# Patient Record
Sex: FEMALE | Race: WHITE | NOT HISPANIC OR LATINO | Employment: PART TIME | URBAN - METROPOLITAN AREA
[De-identification: names, ages, dates, MRNs, and addresses within clinical notes are randomized per-mention and may not be internally consistent; named-entity substitution may affect disease eponyms.]

---

## 2017-03-21 DIAGNOSIS — F33.42 MAJOR DEPRESSIVE DISORDER, RECURRENT EPISODE, IN FULL REMISSION (H): ICD-10-CM

## 2017-03-21 DIAGNOSIS — I10 BENIGN ESSENTIAL HYPERTENSION: ICD-10-CM

## 2017-03-21 RX ORDER — ESCITALOPRAM OXALATE 10 MG/1
TABLET ORAL
Qty: 90 TABLET | Refills: 2 | OUTPATIENT
Start: 2017-03-21

## 2017-03-21 NOTE — TELEPHONE ENCOUNTER
escitalopram 10mg      Last Written Prescription Date:  6/24/16  Last Fill Quantity: 90,   # refills: 3  Last Office Visit with [facility] primary care provider:  6/24/16  Future Office visit: none    Called pt's pharmacy ([facility]) who stated refill request sent in error. Rx denied.

## 2017-03-22 RX ORDER — LISINOPRIL/HYDROCHLOROTHIAZIDE 10-12.5 MG
TABLET ORAL
Qty: 90 TABLET | Refills: 2 | OUTPATIENT
Start: 2017-03-22

## 2017-03-22 NOTE — TELEPHONE ENCOUNTER
This is a duplicate Medication request of an already active prescription.  Notified pharmacy of date of original sent prescription  Kasey Morrow RN

## 2017-04-20 DIAGNOSIS — I10 BENIGN ESSENTIAL HYPERTENSION: ICD-10-CM

## 2017-04-20 RX ORDER — LISINOPRIL/HYDROCHLOROTHIAZIDE 10-12.5 MG
TABLET ORAL
Qty: 90 TABLET | Refills: 2 | OUTPATIENT
Start: 2017-04-20

## 2017-04-21 DIAGNOSIS — F33.42 MAJOR DEPRESSIVE DISORDER, RECURRENT EPISODE, IN FULL REMISSION (H): ICD-10-CM

## 2017-04-21 RX ORDER — ESCITALOPRAM OXALATE 10 MG/1
TABLET ORAL
Qty: 30 TABLET | Refills: 0 | Status: SHIPPED | OUTPATIENT
Start: 2017-04-21 | End: 2017-05-23

## 2017-04-21 NOTE — TELEPHONE ENCOUNTER
lexapro 10mg      Last Written Prescription Date:  6/24/16  Last Fill Quantity: 90,   # refills: 3  Last Office Visit with [facility] primary care provider:  6/24/16  Future Office visit: none    Pt due for annual, one month extension sent.

## 2017-05-18 DIAGNOSIS — I10 BENIGN ESSENTIAL HYPERTENSION: ICD-10-CM

## 2017-05-18 NOTE — TELEPHONE ENCOUNTER
lisinopril-hydrochlorothiazide (PRINZIDE,ZESTORETIC) 10-12.5 MG per tablet 90 tablet 3 9/20/2016          Last Written Prescription Date: 09/20/2016  Last Fill Quantity: 90, # refills: 3  Last Office Visit with [facility], [facility] or [facility] prescribing provider: 09/20/2016       Potassium   Date Value Ref Range Status   06/24/2016 4.6 3.4 - 5.3 mmol/L Final     Creatinine   Date Value Ref Range Status   06/24/2016 0.68 0.52 - 1.04 mg/dL Final     BP Readings from Last 3 Encounters:   09/20/16 148/80   08/26/16 (!) 152/92   07/25/16 159/84

## 2017-05-19 RX ORDER — LISINOPRIL/HYDROCHLOROTHIAZIDE 10-12.5 MG
TABLET ORAL
Qty: 90 TABLET | Refills: 1 | Status: SHIPPED | OUTPATIENT
Start: 2017-05-19 | End: 2017-07-06

## 2017-05-19 NOTE — TELEPHONE ENCOUNTER
Prescription approved per [facility] Refill Protocol.  Mckenzie De Luna RN

## 2017-05-21 DIAGNOSIS — F33.42 MAJOR DEPRESSIVE DISORDER, RECURRENT EPISODE, IN FULL REMISSION (H): ICD-10-CM

## 2017-05-22 RX ORDER — ESCITALOPRAM OXALATE 10 MG/1
TABLET ORAL
Qty: 30 TABLET | Refills: 0 | OUTPATIENT
Start: 2017-05-22

## 2017-05-22 NOTE — TELEPHONE ENCOUNTER
escitalopram 10mg      Last Written Prescription Date:  4/21/17  Last Fill Quantity: 30,   # refills: 0  Last Office Visit with [facility] primary care provider:  6/24/16  Future Office visit: none    Pt due for annual, no appt scheduled. Already received one month extension. Rx denied.

## 2017-05-23 RX ORDER — ESCITALOPRAM OXALATE 10 MG/1
10 TABLET ORAL DAILY
Qty: 30 TABLET | Refills: 0 | Status: SHIPPED | OUTPATIENT
Start: 2017-05-23 | End: 2017-06-20

## 2017-05-23 NOTE — TELEPHONE ENCOUNTER
Patient calling confused as to why she was told by pharmacy she is due for annual. Her last annual was 6/24/16 and will not have enough medication to get to appointment. She is not sure why her prescription did not bring her to her yearly exam date, but needs a refill. Rx/pharm pended. Please send if appropriate. Thanks.

## 2017-07-06 ENCOUNTER — RADIANT APPOINTMENT (OUTPATIENT)
Dept: MAMMOGRAPHY | Facility: CLINIC | Age: 57
End: 2017-07-06
Payer: COMMERCIAL

## 2017-07-06 ENCOUNTER — OFFICE VISIT (OUTPATIENT)
Dept: OBGYN | Facility: CLINIC | Age: 57
End: 2017-07-06
Payer: COMMERCIAL

## 2017-07-06 VITALS
DIASTOLIC BLOOD PRESSURE: 90 MMHG | BODY MASS INDEX: 30.53 KG/M2 | HEIGHT: 66 IN | SYSTOLIC BLOOD PRESSURE: 126 MMHG | WEIGHT: 190 LBS

## 2017-07-06 DIAGNOSIS — E78.49 OTHER HYPERLIPIDEMIA: ICD-10-CM

## 2017-07-06 DIAGNOSIS — I10 BENIGN ESSENTIAL HYPERTENSION: ICD-10-CM

## 2017-07-06 DIAGNOSIS — Z12.31 VISIT FOR SCREENING MAMMOGRAM: ICD-10-CM

## 2017-07-06 DIAGNOSIS — F33.42 MAJOR DEPRESSIVE DISORDER, RECURRENT EPISODE, IN FULL REMISSION (H): ICD-10-CM

## 2017-07-06 DIAGNOSIS — R73.01 ELEVATED FASTING GLUCOSE: ICD-10-CM

## 2017-07-06 DIAGNOSIS — I10 HYPERTENSION, ESSENTIAL: ICD-10-CM

## 2017-07-06 DIAGNOSIS — Z01.419 ENCOUNTER FOR GYNECOLOGICAL EXAMINATION WITHOUT ABNORMAL FINDING: Primary | ICD-10-CM

## 2017-07-06 LAB — HBA1C MFR BLD: 5.5 % (ref 4.3–6)

## 2017-07-06 PROCEDURE — 80061 LIPID PANEL: CPT | Performed by: OBSTETRICS & GYNECOLOGY

## 2017-07-06 PROCEDURE — 36415 COLL VENOUS BLD VENIPUNCTURE: CPT | Performed by: OBSTETRICS & GYNECOLOGY

## 2017-07-06 PROCEDURE — 77063 BREAST TOMOSYNTHESIS BI: CPT | Mod: TC

## 2017-07-06 PROCEDURE — 83036 HEMOGLOBIN GLYCOSYLATED A1C: CPT | Performed by: OBSTETRICS & GYNECOLOGY

## 2017-07-06 PROCEDURE — 80051 ELECTROLYTE PANEL: CPT | Performed by: OBSTETRICS & GYNECOLOGY

## 2017-07-06 PROCEDURE — G0202 SCR MAMMO BI INCL CAD: HCPCS | Mod: TC

## 2017-07-06 PROCEDURE — 82947 ASSAY GLUCOSE BLOOD QUANT: CPT | Performed by: OBSTETRICS & GYNECOLOGY

## 2017-07-06 PROCEDURE — 99396 PREV VISIT EST AGE 40-64: CPT | Performed by: OBSTETRICS & GYNECOLOGY

## 2017-07-06 RX ORDER — ESCITALOPRAM OXALATE 10 MG/1
10 TABLET ORAL DAILY
Qty: 90 TABLET | Refills: 0 | Status: SHIPPED | OUTPATIENT
Start: 2017-07-06 | End: 2017-10-01

## 2017-07-06 RX ORDER — LISINOPRIL/HYDROCHLOROTHIAZIDE 10-12.5 MG
1 TABLET ORAL DAILY
Qty: 30 TABLET | Refills: 0 | Status: SHIPPED | OUTPATIENT
Start: 2017-07-06 | End: 2017-08-04

## 2017-07-06 ASSESSMENT — ANXIETY QUESTIONNAIRES
7. FEELING AFRAID AS IF SOMETHING AWFUL MIGHT HAPPEN: NOT AT ALL
3. WORRYING TOO MUCH ABOUT DIFFERENT THINGS: NOT AT ALL
5. BEING SO RESTLESS THAT IT IS HARD TO SIT STILL: NOT AT ALL
6. BECOMING EASILY ANNOYED OR IRRITABLE: SEVERAL DAYS
2. NOT BEING ABLE TO STOP OR CONTROL WORRYING: NOT AT ALL
IF YOU CHECKED OFF ANY PROBLEMS ON THIS QUESTIONNAIRE, HOW DIFFICULT HAVE THESE PROBLEMS MADE IT FOR YOU TO DO YOUR WORK, TAKE CARE OF THINGS AT HOME, OR GET ALONG WITH OTHER PEOPLE: NOT DIFFICULT AT ALL
1. FEELING NERVOUS, ANXIOUS, OR ON EDGE: NOT AT ALL
GAD7 TOTAL SCORE: 1

## 2017-07-06 ASSESSMENT — PATIENT HEALTH QUESTIONNAIRE - PHQ9: 5. POOR APPETITE OR OVEREATING: NOT AT ALL

## 2017-07-06 NOTE — MR AVS SNAPSHOT
After Visit Summary   7/6/2017    Dixie Miller    MRN: [id number]           Patient Information     Date Of Birth          1960        Visit Information        Provider Department      7/6/2017 10:00 AM Davie Pace MD [facility]        Today's Diagnoses     Encounter for gynecological examination without abnormal finding    -  1    Major depressive disorder, recurrent episode, in full remission (H)        Benign essential hypertension        Other hyperlipidemia        Hypertension, essential        Elevated fasting glucose           Follow-ups after your visit        Who to contact     If you have questions or need follow up information about today's clinic visit or your schedule please contact [facility] [place] directly at [phone].  Normal or non-critical lab and imaging results will be communicated to you by MyChart, letter or phone within 4 business days after the clinic has received the results. If you do not hear from us within 7 days, please contact the clinic through Pockeehart or phone. If you have a critical or abnormal lab result, we will notify you by phone as soon as possible.  Submit refill requests through Sporthold or call your pharmacy and they will forward the refill request to us. Please allow 3 business days for your refill to be completed.          Additional Information About Your Visit        MyChart Information     Sporthold gives you secure access to your electronic health record. If you see a primary care provider, you can also send messages to your care team and make appointments. If you have questions, please call your primary care clinic.  If you do not have a primary care provider, please call [phone] and they will assist you.        Care EveryWhere ID     This is your Care EveryWhere ID. This could be used by other organizations to access your [place] medical records  [id number]        Your Vitals Were      "Height BMI (Body Mass Index)                5' 6\" (1.676 m) 30.67 kg/m2           Blood Pressure from Last 3 Encounters:   07/06/17 126/90   09/20/16 148/80   08/26/16 (!) 152/92    Weight from Last 3 Encounters:   07/06/17 190 lb (86.2 kg)   09/23/16 190 lb (86.2 kg)   09/20/16 190 lb (86.2 kg)              We Performed the Following     Electrolyte panel     Glucose     Hemoglobin A1c     Lipid Profile          Today's Medication Changes          These changes are accurate as of: 7/6/17 10:51 AM.  If you have any questions, ask your nurse or doctor.               These medicines have changed or have updated prescriptions.        Dose/Directions    escitalopram 10 MG tablet   Commonly known as:  LEXAPRO   This may have changed:  See the new instructions.   Used for:  Major depressive disorder, recurrent episode, in full remission (H)   Changed by:  Davie Pace MD        Dose:  10 mg   Take 1 tablet (10 mg) by mouth daily   Quantity:  90 tablet   Refills:  0       lisinopril-hydrochlorothiazide 10-12.5 MG per tablet   Commonly known as:  PRINZIDE/ZESTORETIC   This may have changed:  See the new instructions.   Used for:  Benign essential hypertension   Changed by:  Davie Pace MD        Dose:  1 tablet   Take 1 tablet by mouth daily   Quantity:  30 tablet   Refills:  0            Where to get your medicines      These medications were sent to [id number] - [place], [place] - [address]  [address] [place] [address]     Phone:  [phone]     escitalopram 10 MG tablet    lisinopril-hydrochlorothiazide 10-12.5 MG per tablet                Primary Care Provider Office Phone # Fax #    Merlin Emery Brown, [phone]       [facility] [address]  [place] [address]        Equal Access to Services     JEOVANY PINZON AH: Dorothea lopezo Sodelvis, waaxda luqadaha, qaybta kaalmada adeegyada, lynn jesus. So wac " [phone].    ATENCIÓN: Si gaby goncalves, tiene a freire disposición servicios gratuitos de asistencia lingüística. Yassine corado [phone].    We comply with applicable federal civil rights laws and [place] laws. We do not discriminate on the basis of race, color, national origin, age, disability sex, sexual orientation or gender identity.            Thank you!     Thank you for choosing [facility] [place]  for your care. Our goal is always to provide you with excellent care. Hearing back from our patients is one way we can continue to improve our services. Please take a few minutes to complete the written survey that you may receive in the mail after your visit with us. Thank you!             Your Updated Medication List - Protect others around you: Learn how to safely use, store and throw away your medicines at www.disposemymeds.org.          This list is accurate as of: 7/6/17 10:51 AM.  Always use your most recent med list.                   Brand Name Dispense Instructions for use Diagnosis    escitalopram 10 MG tablet    LEXAPRO    90 tablet    Take 1 tablet (10 mg) by mouth daily    Major depressive disorder, recurrent episode, in full remission (H)       lisinopril-hydrochlorothiazide 10-12.5 MG per tablet    PRINZIDE/ZESTORETIC    30 tablet    Take 1 tablet by mouth daily    Benign essential hypertension       MULTI ADULT GUMMIES PO

## 2017-07-06 NOTE — PROGRESS NOTES
Dixie is a 56 year old  female who presents for annual exam.     Besides routine health maintenance, she has no other health concerns today .    HPI:  The patient's PCP is Merlin Brown, MD  Has seen Tarah in past. May change to Brown.   On Lisinopril/HCTZ. BPs still high here and at [facility].   Planning to exercise with friend this month. Hasn't been regular. Will diet as well.   Wondering if needs Lexapro. Considering weaning.  with low libido. Pt with low libido.      GYNECOLOGIC HISTORY:    No LMP recorded. Patient is postmenopausal.  Her current contraception method is: menopause.  She  reports that she has never smoked. She has never used smokeless tobacco.    Patient is not sexually active.  STD testing offered?  Declined  Last PHQ-9 score on record =   PHQ-9 SCORE 2017   Total Score 1     Last GAD7 score on record =   ELISE-7 SCORE 2017   Total Score 1     Alcohol Score = 2    HEALTH MAINTENANCE:  Cholesterol:  Cholesterol   Date Value Ref Range Status   2016 206 (H) <200 mg/dL Final     Comment:     Desirable:       <200 mg/dl     Last Mammo: 16, Result: normal, Next Mammo: today   Pap: 16 WNL HPV Neg  Lab Results   Component Value Date    PAP NIL 2016      Colonoscopy:  10/28/16, Result: normal, Next Colonoscopy:   Dexa: Never    Health maintenance updated:  yes    HISTORY:  Obstetric History       T2      L2     SAB0   TAB0   Ectopic0   Multiple0   Live Births0       # Outcome Date GA Lbr Jerry/2nd Weight Sex Delivery Anes PTL Lv   2 Term            1 Term                   Patient Active Problem List   Diagnosis     Benign essential hypertension     Dysthymia     Past Surgical History:   Procedure Laterality Date      SECTION        Social History   Substance Use Topics     Smoking status: Never Smoker     Smokeless tobacco: Never Used     Alcohol use 0.0 oz/week     0 Standard drinks or equivalent per week      Comment: 3 wine a  "day      Problem (# of Occurrences) Relation (Name,Age of Onset)    DIABETES (1) Father            Current Outpatient Prescriptions   Medication Sig     escitalopram (LEXAPRO) 10 MG tablet Take 1 tablet (10 mg) by mouth daily     lisinopril-hydrochlorothiazide (PRINZIDE/ZESTORETIC) 10-12.5 MG per tablet Take 1 tablet by mouth daily     [DISCONTINUED] escitalopram (LEXAPRO) 10 MG tablet TAKE 1 TABLET BY MOUTH EVERY DAY     [DISCONTINUED] lisinopril-hydrochlorothiazide (PRINZIDE/ZESTORETIC) 10-12.5 MG per tablet TAKE 1 TABLET BY MOUTH DAILY     Multiple Vitamins-Minerals (MULTI ADULT GUMMIES PO)      No current facility-administered medications for this visit.      No Known Allergies    Past medical, surgical, social and family histories were reviewed and updated in EPIC.    ROS:   12 point review of systems negative other than symptoms noted below.  Gastrointestinal: Constipation and Diarrhea  Musculoskeletal: Joint Pain  Psychiatric: Depression    EXAM:  /90  Ht 5' 6\" (1.676 m)  Wt 190 lb (86.2 kg)  BMI 30.67 kg/m2   BMI: Body mass index is 30.67 kg/(m^2).    PHYSICAL EXAM:  Constitutional:  Appearance: Well nourished, well developed, alert, in no acute distress  Neck:  Lymph Nodes:  No lymphadenopathy present    Thyroid:  Gland size normal, nontender, no nodules or masses present  on palpation  Chest:  Respiratory Effort:  Breathing unlabored  Cardiovascular:    Heart: Auscultation:  Regular rate, normal rhythm, no murmurs present  Breasts: Inspection of Breasts:  No lymphadenopathy present    Palpation of Breasts and Axillae:  No masses present on palpation, no  breast tenderness    Axillary Lymph Nodes:  No lymphadenopathy present  Gastrointestinal:   Abdominal Examination:  Abdomen nontender to palpation, tone normal without rigidity or guarding, no masses present, umbilicus without lesions   Liver and Spleen:  No hepatomegaly present, liver nontender to palpation    Hernias:  No hernias " present  Lymphatic: Lymph Nodes:  No other lymphadenopathy present  Skin:  General Inspection:  No rashes present, no lesions present, no areas of  discoloration    Genitalia and Groin:  No rashes present, no lesions present, no areas of  discoloration, no masses present  Neurologic/Psychiatric:    Mental Status:  Oriented X3     Pelvic Exam:  External Genitalia:     Normal appearance for age, no discharge present, no tenderness present, no inflammatory lesions present, color normal  Vagina:     Normal vaginal vault without central or paravaginal defects, no discharge present, no inflammatory lesions present, no masses present  Bladder:     Nontender to palpation  Urethra:   Urethral Body:  Urethra palpation normal, urethra structural support normal   Urethral Meatus:  No erythema or lesions present  Cervix:     Appearance healthy, no lesions present, nontender to palpation, no bleeding present  Uterus:     Uterus: firm, normal sized and nontender, anteverted in position.   Adnexa:     No adnexal tenderness present, no adnexal masses present  Perineum:     Perineum within normal limits, no evidence of trauma, no rashes or skin lesions present  Anus:     Anus within normal limits, no hemorrhoids present  Inguinal Lymph Nodes:     No lymphadenopathy present  Pubic Hair:     Normal pubic hair distribution for age  Genitalia and Groin:     No rashes present, no lesions present, no areas of discoloration, no masses present    COUNSELING:   Reviewed preventive health counseling, as reflected in patient instructions       Regular exercise    BMI: Body mass index is 30.67 kg/(m^2).  Weight management plan: Diet and exercise    ASSESSMENT:  56 year old female with satisfactory annual exam.    ICD-10-CM    1. Encounter for gynecological examination without abnormal finding Z01.419    2. Major depressive disorder, recurrent episode, in full remission (H) F33.42 escitalopram (LEXAPRO) 10 MG tablet   3. Benign essential  hypertension I10 lisinopril-hydrochlorothiazide (PRINZIDE/ZESTORETIC) 10-12.5 MG per tablet   4. Other hyperlipidemia E78.4 Lipid Profile   5. Hypertension, essential I10 Electrolyte panel   6. Elevated fasting glucose R73.01 Glucose     Hemoglobin A1c       PLAN:  Will give 30 day Rx for Lisinopril and see if diet and exercise helps. Return to [facility] in 30 days. If BP high will have med change.  Discussed weaning Lexapro.    Davie Pace MD

## 2017-07-07 LAB
ANION GAP SERPL CALCULATED.3IONS-SCNC: 6 MMOL/L (ref 3–14)
CHLORIDE SERPL-SCNC: 103 MMOL/L (ref 94–109)
CHOLEST SERPL-MCNC: 223 MG/DL
CO2 SERPL-SCNC: 32 MMOL/L (ref 20–32)
GLUCOSE SERPL-MCNC: 103 MG/DL (ref 70–99)
HDLC SERPL-MCNC: 69 MG/DL
LDLC SERPL CALC-MCNC: 123 MG/DL
NONHDLC SERPL-MCNC: 154 MG/DL
POTASSIUM SERPL-SCNC: 3.9 MMOL/L (ref 3.4–5.3)
SODIUM SERPL-SCNC: 141 MMOL/L (ref 133–144)
TRIGL SERPL-MCNC: 154 MG/DL

## 2017-07-07 ASSESSMENT — ANXIETY QUESTIONNAIRES: GAD7 TOTAL SCORE: 1

## 2017-07-07 ASSESSMENT — PATIENT HEALTH QUESTIONNAIRE - PHQ9: SUM OF ALL RESPONSES TO PHQ QUESTIONS 1-9: 1

## 2017-07-07 NOTE — PROGRESS NOTES
Dear LINDA,   Lipids overall not bad. Glucose a little high but HbA1c normal so nothing to do except the dieting and exercise.   Plan to repeat fasting labs next year.       Davie Pace MD

## 2017-08-04 ENCOUNTER — OFFICE VISIT (OUTPATIENT)
Dept: FAMILY MEDICINE | Facility: CLINIC | Age: 57
End: 2017-08-04
Payer: COMMERCIAL

## 2017-08-04 VITALS
HEART RATE: 99 BPM | OXYGEN SATURATION: 96 % | WEIGHT: 191 LBS | TEMPERATURE: 97.6 F | DIASTOLIC BLOOD PRESSURE: 78 MMHG | HEIGHT: 66 IN | SYSTOLIC BLOOD PRESSURE: 127 MMHG | BODY MASS INDEX: 30.7 KG/M2

## 2017-08-04 DIAGNOSIS — F34.1 DYSTHYMIA: ICD-10-CM

## 2017-08-04 DIAGNOSIS — I10 BENIGN ESSENTIAL HYPERTENSION: Primary | ICD-10-CM

## 2017-08-04 DIAGNOSIS — Z11.59 NEED FOR HEPATITIS C SCREENING TEST: ICD-10-CM

## 2017-08-04 PROCEDURE — 86803 HEPATITIS C AB TEST: CPT | Performed by: INTERNAL MEDICINE

## 2017-08-04 PROCEDURE — 99213 OFFICE O/P EST LOW 20 MIN: CPT | Performed by: INTERNAL MEDICINE

## 2017-08-04 PROCEDURE — 80048 BASIC METABOLIC PNL TOTAL CA: CPT | Performed by: INTERNAL MEDICINE

## 2017-08-04 PROCEDURE — 36415 COLL VENOUS BLD VENIPUNCTURE: CPT | Performed by: INTERNAL MEDICINE

## 2017-08-04 RX ORDER — LISINOPRIL/HYDROCHLOROTHIAZIDE 10-12.5 MG
1 TABLET ORAL DAILY
Qty: 90 TABLET | Refills: 3 | Status: SHIPPED | OUTPATIENT
Start: 2017-08-04 | End: 2018-05-02

## 2017-08-04 RX ORDER — BUPROPION HYDROCHLORIDE 150 MG/1
150 TABLET ORAL EVERY MORNING
Qty: 90 TABLET | Refills: 3 | Status: SHIPPED | OUTPATIENT
Start: 2017-08-04 | End: 2017-11-22 | Stop reason: ALTCHOICE

## 2017-08-04 NOTE — PATIENT INSTRUCTIONS
Be sure to exercise more, try to get your weight down 10 pounds, limit your alcohol to 7 to 10 a week.  Monitor your blood pressure and if it is not staying under 140/90 let me know.    Change the lexapro to 5mg and then after 2 weeks you can switch to the wellbutrin, let me know if this does not work for you.    Nael Tapia M.D.

## 2017-08-04 NOTE — PROGRESS NOTES
Dixie Miller is a 56 year old female who presents for follow up hypertension, dysthymia.  For the latter doing fine but decreased sexual interest and wonders about going off it.  No depression.      For the blood pressure borderline, but not working out reg, weight and issue and alcohol a bit much.  She feels fine, no chest pain or shortness of breath, no ha.    She is up to date mammogram, colon and pap.    Past Medical History:   Diagnosis Date     Benign essential hypertension     added 2nd med 9/15     Dysthymia      H/O colonoscopy 10/2016    at [facility], 2mm hyperplastic polyp, fu 10 years     High cholesterol      IBS (irritable bowel syndrome)      Past Surgical History:   Procedure Laterality Date      SECTION       Social History     Social History     Marital status:      Spouse name: N/A     Number of children: 2     Years of education: N/A     Occupational History     retail at [employer]      Social History Main Topics     Smoking status: Never Smoker     Smokeless tobacco: Never Used     Alcohol use 0.0 oz/week     0 Standard drinks or equivalent per week      Comment: 3 wine a day     Drug use: No     Sexual activity: Yes     Birth control/ protection: Post-menopausal     Other Topics Concern     Not on file     Social History Narrative     Current Outpatient Prescriptions   Medication Sig Dispense Refill     lisinopril-hydrochlorothiazide (PRINZIDE/ZESTORETIC) 10-12.5 MG per tablet Take 1 tablet by mouth daily 90 tablet 3     buPROPion (WELLBUTRIN XL) 150 MG 24 hr tablet Take 1 tablet (150 mg) by mouth every morning 90 tablet 3     escitalopram (LEXAPRO) 10 MG tablet Take 1 tablet (10 mg) by mouth daily 90 tablet 0     Multiple Vitamins-Minerals (MULTI ADULT GUMMIES PO)        [DISCONTINUED] lisinopril-hydrochlorothiazide (PRINZIDE/ZESTORETIC) 10-12.5 MG per tablet Take 1 tablet by mouth daily 30 tablet 0     No Known Allergies  FAMILY HISTORY NOTED AND  "REVIEWED    REVIEW OF SYSTEMS: above    PHYSICAL EXAM    /78 (BP Location: Left arm, Patient Position: Chair, Cuff Size: Adult Regular)  Pulse 99  Temp 97.6  F (36.4  C) (Oral)  Ht 5' 6\" (1.676 m)  Wt 191 lb (86.6 kg)  SpO2 96%  Breastfeeding? No  BMI 30.83 kg/m2    Patient appears non toxic  Lungs clear  cv reglar rate and rhythm, nmgr, no jvp or edema    Labs sent    ASSESSMENT:  1. Hypertension, control fair, she will try exercise, diet and if not good call  2. Dysthymia, dec sex drive, ok to wean to 5mg lexapro then start wellbutrin and dc it, call if not effective  3. Health care maintenance    PLAN:  Above    Nael Tapia M.D.        "

## 2017-08-04 NOTE — NURSING NOTE
"Chief Complaint   Patient presents with     Hypertension       Initial /78 (BP Location: Left arm, Patient Position: Chair, Cuff Size: Adult Regular)  Pulse 99  Temp 97.6  F (36.4  C) (Oral)  Ht 5' 6\" (1.676 m)  Wt 191 lb (86.6 kg)  SpO2 96%  Breastfeeding? No  BMI 30.83 kg/m2 Estimated body mass index is 30.83 kg/(m^2) as calculated from the following:    Height as of this encounter: 5' 6\" (1.676 m).    Weight as of this encounter: 191 lb (86.6 kg).  Medication Reconciliation: complete.  Gabbi Luis CMA    "

## 2017-08-04 NOTE — MR AVS SNAPSHOT
After Visit Summary   8/4/2017    Dixie Miller    MRN: [id number]           Patient Information     Date Of Birth          1960        Visit Information        Provider Department      8/4/2017 11:30 AM Nael Tapia MD [facility]        Today's Diagnoses     Benign essential hypertension    -  1    Dysthymia        Need for hepatitis C screening test          Care Instructions    Be sure to exercise more, try to get your weight down 10 pounds, limit your alcohol to 7 to 10 a week.  Monitor your blood pressure and if it is not staying under 140/90 let me know.    Change the lexapro to 5mg and then after 2 weeks you can switch to the wellbutrin, let me know if this does not work for you.    Nael Tapia M.D.            Follow-ups after your visit        Who to contact     If you have questions or need follow up information about today's clinic visit or your schedule please contact [facility] directly at [phone].  Normal or non-critical lab and imaging results will be communicated to you by Interactive Networkshart, letter or phone within 4 business days after the clinic has received the results. If you do not hear from us within 7 days, please contact the clinic through E[employer] Studiot or phone. If you have a critical or abnormal lab result, we will notify you by phone as soon as possible.  Submit refill requests through eVropa or call your pharmacy and they will forward the refill request to us. Please allow 3 business days for your refill to be completed.          Additional Information About Your Visit        Interactive Networkshart Information     eVropa gives you secure access to your electronic health record. If you see a primary care provider, you can also send messages to your care team and make appointments. If you have questions, please call your primary care clinic.  If you do not have a primary care provider, please call [phone] and they will assist you.        Care  "EveryWhere ID     This is your Care EveryWhere ID. This could be used by other organizations to access your [place] medical records  [id number]        Your Vitals Were     Pulse Temperature Height Pulse Oximetry Breastfeeding? BMI (Body Mass Index)    99 97.6  F (36.4  C) (Oral) 5' 6\" (1.676 m) 96% No 30.83 kg/m2       Blood Pressure from Last 3 Encounters:   08/04/17 127/78   07/06/17 126/90   09/20/16 148/80    Weight from Last 3 Encounters:   08/04/17 191 lb (86.6 kg)   07/06/17 190 lb (86.2 kg)   09/23/16 190 lb (86.2 kg)              We Performed the Following     Basic metabolic panel     Hepatitis C Screen Reflex to HCV RNA Quant and Genotype          Today's Medication Changes          These changes are accurate as of: 8/4/17 11:57 AM.  If you have any questions, ask your nurse or doctor.               Start taking these medicines.        Dose/Directions    buPROPion 150 MG 24 hr tablet   Commonly known as:  WELLBUTRIN XL   Used for:  Dysthymia   Started by:  Nael Tapia MD        Dose:  150 mg   Take 1 tablet (150 mg) by mouth every morning   Quantity:  90 tablet   Refills:  3            Where to get your medicines      These medications were sent to [id number] [place] - [address]  [address] [place] [address]     Phone:  [phone]     lisinopril-hydrochlorothiazide 10-12.5 MG per tablet         Some of these will need a paper prescription and others can be bought over the counter.  Ask your nurse if you have questions.     Bring a paper prescription for each of these medications     buPROPion 150 MG 24 hr tablet                Primary Care Provider Office Phone # Fax #    Nael Tapia [phone]       [facility] [address]  [place] [address]        Equal Access to Services     JEOVANY PINZON AH: Hadii aad ku hadasho Soomaali, waaxda luqadaha, qaybta kaalmada adeegyada, lynn rivas ah. So wa " [phone].    ATENCIÓN: Si gaby goncalves, tiene a freire disposición servicios gratuitos de asistencia lingüística. Yassine corado [phone].    We comply with applicable federal civil rights laws and [place] laws. We do not discriminate on the basis of race, color, national origin, age, disability sex, sexual orientation or gender identity.            Thank you!     Thank you for choosing [facility]  for your care. Our goal is always to provide you with excellent care. Hearing back from our patients is one way we can continue to improve our services. Please take a few minutes to complete the written survey that you may receive in the mail after your visit with us. Thank you!             Your Updated Medication List - Protect others around you: Learn how to safely use, store and throw away your medicines at www.disposemymeds.org.          This list is accurate as of: 8/4/17 11:57 AM.  Always use your most recent med list.                   Brand Name Dispense Instructions for use Diagnosis    buPROPion 150 MG 24 hr tablet    WELLBUTRIN XL    90 tablet    Take 1 tablet (150 mg) by mouth every morning    Dysthymia       escitalopram 10 MG tablet    LEXAPRO    90 tablet    Take 1 tablet (10 mg) by mouth daily    Major depressive disorder, recurrent episode, in full remission (H)       lisinopril-hydrochlorothiazide 10-12.5 MG per tablet    PRINZIDE/ZESTORETIC    90 tablet    Take 1 tablet by mouth daily    Benign essential hypertension       MULTI ADULT GUMMIES PO

## 2017-08-05 LAB
ANION GAP SERPL CALCULATED.3IONS-SCNC: 7 MMOL/L (ref 3–14)
BUN SERPL-MCNC: 17 MG/DL (ref 7–30)
CALCIUM SERPL-MCNC: 9.5 MG/DL (ref 8.5–10.1)
CHLORIDE SERPL-SCNC: 103 MMOL/L (ref 94–109)
CO2 SERPL-SCNC: 31 MMOL/L (ref 20–32)
CREAT SERPL-MCNC: 0.7 MG/DL (ref 0.52–1.04)
GFR SERPL CREATININE-BSD FRML MDRD: 85 ML/MIN/1.7M2
GLUCOSE SERPL-MCNC: 106 MG/DL (ref 70–99)
POTASSIUM SERPL-SCNC: 4.7 MMOL/L (ref 3.4–5.3)
SODIUM SERPL-SCNC: 141 MMOL/L (ref 133–144)

## 2017-08-05 ASSESSMENT — PATIENT HEALTH QUESTIONNAIRE - PHQ9: SUM OF ALL RESPONSES TO PHQ QUESTIONS 1-9: 5

## 2017-08-07 LAB — HCV AB SERPL QL IA: NORMAL

## 2017-08-07 NOTE — PROGRESS NOTES
It was a pleasure to see you.  I wanted to let you know your test results.  Please let me know if you are not able to view them.    Your labs are fine.  No signs of hepatitis C and your blood salts and kidneys look fine.  Your sugar is just a bit high so please be sure to exercise and keep the weight down for this.    If you have any questions please call me.    Nael Tapai M.D.

## 2017-11-22 ENCOUNTER — HOSPITAL ENCOUNTER (EMERGENCY)
Facility: CLINIC | Age: 57
Discharge: HOME | End: 2017-11-22
Attending: EMERGENCY MEDICINE | Admitting: EMERGENCY MEDICINE
Payer: COMMERCIAL

## 2017-11-22 ENCOUNTER — APPOINTMENT (OUTPATIENT)
Dept: GENERAL RADIOLOGY | Facility: CLINIC | Age: 57
End: 2017-11-22
Attending: EMERGENCY MEDICINE
Payer: COMMERCIAL

## 2017-11-22 VITALS
WEIGHT: 185 LBS | DIASTOLIC BLOOD PRESSURE: 66 MMHG | RESPIRATION RATE: 18 BRPM | OXYGEN SATURATION: 100 % | HEART RATE: 84 BPM | HEIGHT: 66 IN | SYSTOLIC BLOOD PRESSURE: 140 MMHG | BODY MASS INDEX: 29.73 KG/M2 | TEMPERATURE: 98.2 F

## 2017-11-22 DIAGNOSIS — M25.569 KNEE PAIN: ICD-10-CM

## 2017-11-22 DIAGNOSIS — M23.91 INTERNAL DERANGEMENT OF KNEE, RIGHT: ICD-10-CM

## 2017-11-22 PROCEDURE — 25000132 ZZH RX MED GY IP 250 OP 250 PS 637: Performed by: EMERGENCY MEDICINE

## 2017-11-22 PROCEDURE — 73560 X-RAY EXAM OF KNEE 1 OR 2: CPT | Mod: RT

## 2017-11-22 PROCEDURE — 99284 EMERGENCY DEPT VISIT MOD MDM: CPT

## 2017-11-22 RX ORDER — HYDROCODONE BITARTRATE AND ACETAMINOPHEN 5; 325 MG/1; MG/1
1 TABLET ORAL ONCE
Status: COMPLETED | OUTPATIENT
Start: 2017-11-22 | End: 2017-11-22

## 2017-11-22 RX ADMIN — HYDROCODONE BITARTRATE AND ACETAMINOPHEN 1 TABLET: 5; 325 TABLET ORAL at 14:41

## 2017-11-22 ASSESSMENT — ENCOUNTER SYMPTOMS: ARTHRALGIAS: 1

## 2017-11-22 NOTE — ED AVS SNAPSHOT
Emergency Department    [address] [place] [address]    Phone:  [phone]    Fax:  [phone]                                       Dixie Miller   MRN: [id number]    Department:   Emergency Department   Date of Visit:  11/22/2017           After Visit Summary Signature Page     I have received my discharge instructions, and my questions have been answered. I have discussed any challenges I see with this plan with the nurse or doctor.    ..........................................................................................................................................  Patient/Patient Representative Signature      ..........................................................................................................................................  Patient Representative Print Name and Relationship to Patient    ..................................................               ................................................  Date                                            Time    ..........................................................................................................................................  Reviewed by Signature/Title    ...................................................              ..............................................  Date                                                            Time

## 2017-11-22 NOTE — ED PROVIDER NOTES
"  History     Chief Complaint:  Knee pain    The history is provided by the patient.      Dixie Miller is a 56 year old female who presents with knee pain. The patient reports that she was at work talking on the phone when she crossed her legs, when she uncrossed them she felt sudden pain. She did not hear any popping sound. She was helped by coworkers to walk but otherwise is not able to bear weight secondary to pain, or bend the knee to 90 degrees (due to pain) prompting her to the emergency department. She says the pain is mostly in the lateral aspect of her knee and does not radiate to her hip. She rates her pain a 2/10 in severity. She has no other complaints.     Allergies:  No known drug allergies     Medications:    Lexapro   Prinzides/zestoretic     Past Medical History:    Anxiety   Benign essential hypertension   Depressive disorder   Dysthymia   H/O colonoscopy   High cholesterol   IBS (irritable bowel syndrome)    Past Surgical History:    C section    Family History:    DM    Social History:  Presents with no one    Tobacco use: Never smoker   Alcohol use: 3 glasses of wine a day   PCP: Nael Tapia    Marital Status:       Review of Systems   Musculoskeletal: Positive for arthralgias and gait problem.   All other systems reviewed and are negative.    Physical Exam     Patient Vitals for the past 24 hrs:   BP Temp Temp src Pulse Resp SpO2 Height Weight   11/22/17 1400 140/66 98.2  F (36.8  C) Oral 84 18 100 % 1.676 m (5' 6\") 83.9 kg (185 lb)        Physical Exam  General: Seen lying in bed, well appearing, alert and pleasant  Eyes: Tracking well, clear conj BL  CV: No JVD, no pallor  Resp: no tachypnea, speaking in full sentences   Skin: no rashes seen, no e/o trauma  MSK- right lower extremity with full range of motion passively to the knee.  Does have mild pain to lateral margin of the knee.  No swelling noted, no skin changes.  Extensor mechanism intact as is flexor mechanism.  No " clonus, no pain out of proportion does have mild pain on ambulation, but is able to bear weight.  Extremities warm and well perfused, normal compartments sensation and motor are intact distally.  No excess laxity of joint  Neuro: MONREAL spontaneously        Emergency Department Course     \Imaging:  Radiographic findings were communicated with the patient who voiced understanding of the findings.     Knee XR, 3 views, Right:  IMPRESSION: Tricompartmental right knee degenerative changes with at least moderate joint space loss and patellofemoral compartment. No fractures are seen. Small amount of fluid in the joint.    WILY DRISCOLL MD    Imaging independently reviewed and agree with radiologist interpretation.      Interventions:  1441: Norco 5/325 1 tablet PO      Emergency Department Course:  Past medical records, nursing notes, and vitals reviewed.  1428: I performed an exam of the patient and obtained history, as documented above.   Above interventions provided.    The patient was sent for a XR while in the emergency department, findings above.   Patinet given knee immobilizer prior to discharge.   1527: I rechecked the patient. Findings and plan explained to the Patient. Patient discharged home with instructions regarding supportive care, medications, and reasons to return. The importance of close follow-up was reviewed.      Impression & Plan      Medical Decision Making:  Dixie Miller is a 56 year old female who presents with a non contact knee pain to her right knee. Patient was ambulatory with a slight limp, has full range of motion at the knee, no pain with the patellar tendon and does have extensor and flexor mechanisms intact. XR done and shows no evidence of evulsion fracture. With this in mind, patient may need, given her pain and MRI as ligamentous injury cannot be ruled out. I will discharge the patient in a knee brace with weight bearing as tolerated. She states she is okay with this plan and can  take over the counter medication for her pain. Discussed very clear return ED precautions and outpatient follow up.     Diagnosis:    ICD-10-CM    1. Internal derangement of knee, right M23.91        Disposition:  Discharged to home with plan as outlined.     Scribe Disclosure:   I, Cory Finnegan, am serving as a scribe at 2:28 PM on 11/22/2017 to document services personally performed by Luke Gong MD based on my observations and the provider's statements to me.       Cory Finnegan  11/22/2017    EMERGENCY DEPARTMENT       Demetri Gong MD  11/22/17 3539

## 2017-11-22 NOTE — DISCHARGE INSTRUCTIONS
How Your Knee Works  A healthy knee bends easily and rotates slightly. The joint absorbs stress and moves smoothly. This allows you to walk, squat, and turn without pain.    A healthy knee  The knee is a hinge joint, formed where the thighbone (femur) and the shinbone (tibia) meet. It is the largest joint in the body. The joint is covered with smooth tissue and powered by large muscles. When all the parts listed below are healthy, a knee should move easily:    Cartilage is a layer of smooth tissue. It covers the ends of the thighbone and shinbone. It also lines the back side of the kneecap. Healthy cartilage absorbs stress and allows the knee to bend easily.    Muscles power the knee and leg for movement.    Tendons attach the muscles to the bones.    Ligaments are bands of tissue that connect bones and brace the joint.    Bones that make up your knee joint include your thighbone (femur), shinbone (tibia), and kneecap (patella).    Menisci are 2 wedge shaped pieces of cartilage that absorb shock between the thighbone and shinbone.  Date Last Reviewed: 9/20/2015 2000-2017 [employer]. [address] [place] [address], [place] [address]. All rights reserved. This information is not intended as a substitute for professional medical care. Always follow your healthcare professional's instructions.

## 2017-11-22 NOTE — ED AVS SNAPSHOT
Emergency Department    [address] [facility] [address]    Phone:  [phone]    Fax:  [phone]                                       Dixie Miller   MRN: [id number]    Department:   Emergency Department   Date of Visit:  11/22/2017           Patient Information     Date Of Birth          1960        Your diagnoses for this visit were:     Internal derangement of knee, right        You were seen by Demetri Gong MD.      Follow-up Information     Follow up with Nael Tapia MD. Call in 1 week.    Specialty:  Internal Medicine    Why:  For repeat evaluation and symptom check and MRI if indicated. You may bear weight as tolerated, as discussed.     Contact information:    [address] [id number] [phone]          Follow up with  Emergency Department.    Specialty:  EMERGENCY MEDICINE    Why:  If symptoms worsen    Contact information:    [address] [facility] [address] [phone]        Discharge Instructions         How Your Knee Works  A healthy knee bends easily and rotates slightly. The joint absorbs stress and moves smoothly. This allows you to walk, squat, and turn without pain.    A healthy knee  The knee is a hinge joint, formed where the thighbone (femur) and the shinbone (tibia) meet. It is the largest joint in the body. The joint is covered with smooth tissue and powered by large muscles. When all the parts listed below are healthy, a knee should move easily:    Cartilage is a layer of smooth tissue. It covers the ends of the thighbone and shinbone. It also lines the back side of the kneecap. Healthy cartilage absorbs stress and allows the knee to bend easily.    Muscles power the knee and leg for movement.    Tendons attach the muscles to the bones.    Ligaments are bands of tissue that connect bones and brace the joint.    Bones that make up your knee joint include your thighbone  (femur), shinbone (tibia), and kneecap (patella).    Menisci are 2 wedge shaped pieces of cartilage that absorb shock between the thighbone and shinbone.  Date Last Reviewed: 9/20/2015 2000-2017 [employer]. [address] [place] [address]. All rights reserved. This information is not intended as a substitute for professional medical care. Always follow your healthcare professional's instructions.          Future Appointments        Provider Department Dept Phone Center    12/4/2017 2:30 PM Nael Tapia MD [facility] [phone]       24 Hour Appointment Hotline       To make an appointment at any [facility], call [phone] ([phone]). If you don't have a family doctor or clinic, we will help you find one. [facility] are conveniently located to serve the needs of you and your family.             Review of your medicines      Our records show that you are taking the medicines listed below. If these are incorrect, please call your family doctor or clinic.        Dose / Directions Last dose taken    escitalopram 10 MG tablet   Commonly known as:  LEXAPRO   Quantity:  90 tablet        TAKE 1 TABLET (10 MG) BY MOUTH DAILY   Refills:  0        lisinopril-hydrochlorothiazide 10-12.5 MG per tablet   Commonly known as:  PRINZIDE/ZESTORETIC   Dose:  1 tablet   Quantity:  90 tablet        Take 1 tablet by mouth daily   Refills:  3        MULTI ADULT GUMMIES PO        Refills:  0                Procedures and tests performed during your visit     Knee XR, 1-2 views, right      Orders Needing Specimen Collection     None      Pending Results     No orders found from 11/20/2017 to 11/23/2017.            Pending Culture Results     No orders found from 11/20/2017 to 11/23/2017.            Pending Results Instructions     If you had any lab results that were not finalized at the time of your Discharge, you can call the ED Lab Result RN at [phone]. You will be  contacted by this team for any positive Lab results or changes in treatment. The nurses are available 7 days a week from 10A to 6:30P.  You can leave a message 24 hours per day and they will return your call.        Test Results From Your Hospital Stay        11/22/2017  2:59 PM      Narrative     XR KNEE RT 1 /2 VW 11/22/2017 2:55 PM    COMPARISON: None.    HISTORY: Knee pain.        Impression     IMPRESSION: Tricompartmental right knee degenerative changes with at  least moderate joint space loss and patellofemoral compartment. No  fractures are seen. Small amount of fluid in the joint.    WILY DRISCOLL MD                Clinical Quality Measure: Blood Pressure Screening     Your blood pressure was checked while you were in the emergency department today. The last reading we obtained was  BP: 140/66 . Please read the guidelines below about what these numbers mean and what you should do about them.  If your systolic blood pressure (the top number) is less than 120 and your diastolic blood pressure (the bottom number) is less than 80, then your blood pressure is normal. There is nothing more that you need to do about it.  If your systolic blood pressure (the top number) is 120-139 or your diastolic blood pressure (the bottom number) is 80-89, your blood pressure may be higher than it should be. You should have your blood pressure rechecked within a year by a primary care provider.  If your systolic blood pressure (the top number) is 140 or greater or your diastolic blood pressure (the bottom number) is 90 or greater, you may have high blood pressure. High blood pressure is treatable, but if left untreated over time it can put you at risk for heart attack, stroke, or kidney failure. You should have your blood pressure rechecked by a primary care provider within the next 4 weeks.  If your provider in the emergency department today gave you specific instructions to follow-up with your doctor or provider even sooner  than that, you should follow that instruction and not wait for up to 4 weeks for your follow-up visit.        Thank you for choosing [facility]       Thank you for choosing [facility] for your care. Our goal is always to provide you with excellent care. Hearing back from our patients is one way we can continue to improve our services. Please take a few minutes to complete the written survey that you may receive in the mail after you visit with us. Thank you!        [employer] Information     [employer] gives you secure access to your electronic health record. If you see a primary care provider, you can also send messages to your care team and make appointments. If you have questions, please call your primary care clinic.  If you do not have a primary care provider, please call [phone] and they will assist you.        Care EveryWhere ID     This is your Care EveryWhere ID. This could be used by other organizations to access your [facility] medical records  [id number]        Equal Access to Services     JEOVANY PINZON : Dorothea Matthew, evin manriquez, aubrie nuno, lynn rivas . [facility] [phone].    ATENCIÓN: Si habla español, tiene a freire disposición servicios gratuitos de asistencia lingüística. Llame al [phone].    We comply with applicable federal civil rights laws and [place] laws. We do not discriminate on the basis of race, color, national origin, age, disability, sex, sexual orientation, or gender identity.            After Visit Summary       This is your record. Keep this with you and show to your community pharmacist(s) and doctor(s) at your next visit.

## 2017-11-24 ENCOUNTER — HOSPITAL ENCOUNTER (OUTPATIENT)
Dept: MRI IMAGING | Facility: CLINIC | Age: 57
Discharge: HOME | End: 2017-11-24
Attending: INTERNAL MEDICINE | Admitting: INTERNAL MEDICINE
Payer: COMMERCIAL

## 2017-11-24 PROCEDURE — 73721 MRI JNT OF LWR EXTRE W/O DYE: CPT | Mod: RT

## 2018-05-02 DIAGNOSIS — F33.42 MAJOR DEPRESSIVE DISORDER, RECURRENT EPISODE, IN FULL REMISSION (H): ICD-10-CM

## 2018-05-02 RX ORDER — ESCITALOPRAM OXALATE 10 MG/1
TABLET ORAL
Qty: 90 TABLET | Refills: 1 | Status: SHIPPED | OUTPATIENT
Start: 2018-05-02 | End: 2018-12-29

## 2018-10-26 DIAGNOSIS — I10 BENIGN ESSENTIAL HYPERTENSION: ICD-10-CM

## 2018-10-26 RX ORDER — LISINOPRIL/HYDROCHLOROTHIAZIDE 10-12.5 MG
TABLET ORAL
Qty: 30 TABLET | Refills: 0 | Status: SHIPPED | OUTPATIENT
Start: 2018-10-26 | End: 2022-05-19

## 2018-10-26 NOTE — TELEPHONE ENCOUNTER
Medication is being filled for 1 time refill only due to:  Patient needs to be seen because it has been more than one year since last visit.   Last seen 8/14/17  Cristiane Cardenas RN

## 2018-10-26 NOTE — TELEPHONE ENCOUNTER
"Lisinopril-hydrochlorothiazide 10-12.5 mg    Last Written Prescription Date:  05/03/18  Last Fill Quantity: 90 tablets,  # refills: 0   Last office visit: 8/4/2017 with prescribing provider:  Willie   Future Office Visit:      Requested Prescriptions   Pending Prescriptions Disp Refills     lisinopril-hydrochlorothiazide (PRINZIDE/ZESTORETIC) 10-12.5 MG per tablet [Pharmacy Med Name: LISINOPRIL-HCTZ 10-12.5 MG TAB] 90 tablet 0     Sig: TAKE 1 TABLET BY MOUTH EVERY DAY. APPOINTMENT NEEDED FOR FURTHER REFILLS.    Diuretics (Including Combos) Protocol Failed    10/26/2018  1:30 AM       Failed - Blood pressure under 140/90 in past 12 months    BP Readings from Last 3 Encounters:   11/22/17 140/66   08/04/17 127/78   07/06/17 126/90                Failed - Recent (12 mo) or future (30 days) visit within the authorizing provider's specialty    Patient had office visit in the last 12 months or has a visit in the next 30 days with authorizing provider or within the authorizing provider's specialty.  See \"Patient Info\" tab in inbasket, or \"Choose Columns\" in Meds & Orders section of the refill encounter.             Failed - Normal serum creatinine on file in past 12 months    Recent Labs   Lab Test  08/04/17   1212   CR  0.70             Failed - Normal serum potassium on file in past 12 months    Recent Labs   Lab Test  08/04/17   1212   POTASSIUM  4.7                   Failed - Normal serum sodium on file in past 12 months    Recent Labs   Lab Test  08/04/17   1212   NA  141             Passed - Patient is age 18 or older       Passed - No active pregancy on record       Passed - No positive pregnancy test in past 12 months          "

## 2018-11-27 ENCOUNTER — HOSPITAL ENCOUNTER (OUTPATIENT)
Dept: BONE DENSITY | Facility: CLINIC | Age: 58
Discharge: HOME | End: 2018-11-27
Attending: INTERNAL MEDICINE | Admitting: INTERNAL MEDICINE
Payer: COMMERCIAL

## 2018-11-27 DIAGNOSIS — Z78.0 POST-MENOPAUSAL: ICD-10-CM

## 2018-11-27 DIAGNOSIS — I10 BENIGN ESSENTIAL HYPERTENSION: ICD-10-CM

## 2018-11-27 PROCEDURE — 77080 DXA BONE DENSITY AXIAL: CPT

## 2018-11-27 NOTE — TELEPHONE ENCOUNTER
"lisinopril-hydrochlorothiazide (PRINZIDE/ZESTORETIC) 10-12.5 MG per tablet 30 tablet 0 10/26/2018  No   Sig: TAKE 1 TABLET BY MOUTH EVERY DAY. APPOINTMENT NEEDED FOR FURTHER REFILLS.   Class: E-Prescribe   Notes to Pharmacy: Refill one month. Please inform patient appointment needed for further refills. Thank you       Last Written Prescription Date:  10/26/2018  Last Fill Quantity: 30,  # refills: 0   Last office visit: 8/4/2017 with prescribing provider:     Future Office Visit:      Requested Prescriptions   Pending Prescriptions Disp Refills     lisinopril-hydrochlorothiazide (PRINZIDE/ZESTORETIC) 10-12.5 MG per tablet [Pharmacy Med Name: LISINOPRIL-HCTZ 10-12.5 MG TAB] 30 tablet 0     Sig: TAKE 1 TABLET BY MOUTH EVERY DAY. APPOINTMENT NEEDED FOR FURTHER REFILLS.    Diuretics (Including Combos) Protocol Failed    11/27/2018 11:49 AM       Failed - Blood pressure under 140/90 in past 12 months    BP Readings from Last 3 Encounters:   11/22/17 140/66   08/04/17 127/78   07/06/17 126/90                Failed - Recent (12 mo) or future (30 days) visit within the authorizing provider's specialty    Patient had office visit in the last 12 months or has a visit in the next 30 days with authorizing provider or within the authorizing provider's specialty.  See \"Patient Info\" tab in inbasket, or \"Choose Columns\" in Meds & Orders section of the refill encounter.             Failed - Normal serum creatinine on file in past 12 months    Recent Labs   Lab Test  08/04/17   1212   CR  0.70             Failed - Normal serum potassium on file in past 12 months    Recent Labs   Lab Test  08/04/17   1212   POTASSIUM  4.7                   Failed - Normal serum sodium on file in past 12 months    Recent Labs   Lab Test  08/04/17   1212   NA  141             Passed - Patient is age 18 or older       Passed - No active pregancy on record       Passed - No positive pregnancy test in past 12 months            "

## 2018-11-29 RX ORDER — LISINOPRIL/HYDROCHLOROTHIAZIDE 10-12.5 MG
TABLET ORAL
Qty: 30 TABLET | Refills: 0 | OUTPATIENT
Start: 2018-11-29

## 2018-12-29 DIAGNOSIS — F33.42 MAJOR DEPRESSIVE DISORDER, RECURRENT EPISODE, IN FULL REMISSION (H): ICD-10-CM

## 2018-12-31 RX ORDER — ESCITALOPRAM OXALATE 10 MG/1
TABLET ORAL
Qty: 30 TABLET | Refills: 0 | Status: SHIPPED | OUTPATIENT
Start: 2018-12-31 | End: 2020-02-13

## 2019-01-24 NOTE — PROGRESS NOTES
Dixie is a 58 year old  female who presents for annual exam.     Besides routine health maintenance, was started on Lisinopril 10mg and is now being followed by primary care provider.    HPI:    Mood good on Lexapro.   Concerned about low libido and lack of sex at home.   Pt actually has libido. Just not having sex with . Neither is initiating things. Even on vacation. They have talked about it but pt never asked specific questions about his libido.  Sees Gennaro for HTN. On Lisinopril.   Brother  from pancreatic cancer last year.     Pt admit to a lot of wine drinking. She goes out a lot and drinks with friends. Can drink at home but not as much. Doesn't feel she has a problem but admits she could cut down some. Understands there are excessive calories with the drinking.   Thinks her father and brother have issues with alcohol.  drinks but not as much as she does.    Son going to medical school at [employer].    The patient's PCP is Dr Merlin Emery Brown, MD.      GYNECOLOGIC HISTORY:    No LMP recorded. Patient is postmenopausal.  She  reports that  has never smoked. she has never used smokeless tobacco.  Patient is sexually active.  STD testing offered?  Declined     Last PHQ-9 score on record =   PHQ-9 SCORE 2019   PHQ-9 Total Score 1     Last GAD7 score on record =   ELISE-7 SCORE 2019   Total Score 0     Alcohol Score = 11    HEALTH MAINTENANCE:  Cholesterol: 17   Total= 223, Triglycerides=154, HDL=69, KFG=477, ZHX=147  Cholesterol   Date Value Ref Range Status   2017 223 (H) <200 mg/dL Final     Comment:     Desirable:       <200 mg/dl   2016 206 (H) <200 mg/dL Final     Comment:     Desirable:       <200 mg/dl   Last Mammo: 17, Result: normal, Next Mammo: today   Pap:   Lab Results   Component Value Date    PAP NIL, NEG-HPV 2016   Colonoscopy:  10/28/16, Result: benign polyp, repeat 10 years, Next Colonoscopy: due   Dexa: 18, followed by  "primary care provider  Lumbar spine: The T-score is -0.5 from L1 to L4.    Hips: left -0.5. right -0.8  Health maintenance updated:  yes    HISTORY:  Obstetric History       T2      L2     SAB0   TAB0   Ectopic0   Multiple0   Live Births0       # Outcome Date GA Lbr Jerry/2nd Weight Sex Delivery Anes PTL Lv   2 Term            1 Term                   Patient Active Problem List   Diagnosis     Benign essential hypertension     Dysthymia     Past Surgical History:   Procedure Laterality Date      SECTION        Social History     Tobacco Use     Smoking status: Never Smoker     Smokeless tobacco: Never Used   Substance Use Topics     Alcohol use: Yes     Alcohol/week: 0.0 oz     Comment: 3 wine a day      Problem (# of Occurrences) Relation (Name,Age of Onset)    Alcoholism (2) Father, Brother    Diabetes (1) Father            Current Outpatient Medications   Medication Sig     buPROPion (WELLBUTRIN XL) 150 MG 24 hr tablet Take 1 tablet (150 mg) by mouth every morning     escitalopram (LEXAPRO) 10 MG tablet TAKE 1 TABLET BY MOUTH EVERY DAY     lisinopril-hydrochlorothiazide (PRINZIDE/ZESTORETIC) 10-12.5 MG per tablet TAKE 1 TABLET BY MOUTH EVERY DAY. APPOINTMENT NEEDED FOR FURTHER REFILLS.     Multiple Vitamins-Minerals (MULTI ADULT GUMMIES PO)      No current facility-administered medications for this visit.      No Known Allergies    Past medical, surgical, social and family histories were reviewed and updated in EPIC.    ROS:   12 point review of systems negative other than symptoms noted below.  Cardiovascular: Palpitations  Endocrine: Decreased Libido  Psychiatric: Anxiety and Significant Memory Loss    EXAM:  /76   Ht 1.664 m (5' 5.5\")   Wt 87.1 kg (192 lb)   BMI 31.46 kg/m     BMI: Body mass index is 31.46 kg/m .    PHYSICAL EXAM:  Constitutional:  Appearance: Well nourished, well developed, alert, in no acute distress  Neck:  Lymph Nodes:  No lymphadenopathy " present    Thyroid:  Gland size normal, nontender, no nodules or masses present  on palpation  Chest:  Respiratory Effort:  Breathing unlabored  Cardiovascular:    Heart: Auscultation:  Regular rate, normal rhythm, no murmurs present  Breasts: Inspection of Breasts:  No lymphadenopathy present., Palpation of Breasts and Axillae:  No masses present on palpation, no breast tenderness., Axillary Lymph Nodes:  No lymphadenopathy present. and No nodularity, asymmetry or nipple discharge bilaterally.  Gastrointestinal:   Abdominal Examination:  Abdomen nontender to palpation, tone normal without rigidity or guarding, no masses present, umbilicus without lesions   Liver and Spleen:  No hepatomegaly present, liver nontender to palpation    Hernias:  No hernias present  Lymphatic: Lymph Nodes:  No other lymphadenopathy present  Skin:  General Inspection:  No rashes present, no lesions present, no areas of  discoloration    Genitalia and Groin:  No rashes present, no lesions present, no areas of  discoloration, no masses present  Neurologic/Psychiatric:    Mental Status:  Oriented X3     Pelvic Exam:  External Genitalia:     Normal appearance for age, no discharge present, no tenderness present, no inflammatory lesions present, color normal  Vagina:     Normal vaginal vault without central or paravaginal defects, no discharge present, no inflammatory lesions present, no masses present  Bladder:     Nontender to palpation  Urethra:   Urethral Body:  Urethra palpation normal, urethra structural support normal   Urethral Meatus:  No erythema or lesions present  Cervix:     Appearance healthy, no lesions present, nontender to palpation, no bleeding present  Uterus:     Uterus: firm, normal sized and nontender, anteverted in position.   Adnexa:     No adnexal tenderness present, no adnexal masses present  Perineum:     Perineum within normal limits, no evidence of trauma, no rashes or skin lesions present  Anus:     Anus within  normal limits, no hemorrhoids present  Inguinal Lymph Nodes:     No lymphadenopathy present  Pubic Hair:     Normal pubic hair distribution for age  Genitalia and Groin:     No rashes present, no lesions present, no areas of discoloration, no masses present      COUNSELING:   Reviewed preventive health counseling, as reflected in patient instructions       Regular exercise    BMI: Body mass index is 31.46 kg/m .  Weight management plan: Patient was referred to their PCP to discuss a diet and exercise plan.    ASSESSMENT:  58 year old female with satisfactory annual exam.    ICD-10-CM    1. Encounter for gynecological examination without abnormal finding Z01.419    2. Benign essential hypertension I10    3. Major depressive disorder, recurrent episode, in full remission (H) F33.42 buPROPion (WELLBUTRIN XL) 150 MG 24 hr tablet       PLAN:  Reviewed mood and meds. Wants to try Wellbutrin instead due to less impact on libido and weight.  She is aware of the drinking and doesn't feel she has a problem. More a habit. Will try to decrease more for the calories.   Start wellbutrin and wean off lexapro.  Gave questions to ask  to assess his libido. If low, he should inquire about testosterone levels.   See Gennaro for HTN f/u.      Davie Pace MD

## 2019-01-25 ENCOUNTER — OFFICE VISIT (OUTPATIENT)
Dept: OBGYN | Facility: CLINIC | Age: 59
End: 2019-01-25
Payer: COMMERCIAL

## 2019-01-25 ENCOUNTER — ANCILLARY PROCEDURE (OUTPATIENT)
Dept: MAMMOGRAPHY | Facility: CLINIC | Age: 59
End: 2019-01-25
Payer: COMMERCIAL

## 2019-01-25 VITALS
HEIGHT: 66 IN | SYSTOLIC BLOOD PRESSURE: 126 MMHG | WEIGHT: 192 LBS | DIASTOLIC BLOOD PRESSURE: 76 MMHG | BODY MASS INDEX: 30.86 KG/M2

## 2019-01-25 DIAGNOSIS — Z12.31 VISIT FOR SCREENING MAMMOGRAM: ICD-10-CM

## 2019-01-25 DIAGNOSIS — I10 BENIGN ESSENTIAL HYPERTENSION: ICD-10-CM

## 2019-01-25 DIAGNOSIS — Z01.419 ENCOUNTER FOR GYNECOLOGICAL EXAMINATION WITHOUT ABNORMAL FINDING: Primary | ICD-10-CM

## 2019-01-25 DIAGNOSIS — F33.42 MAJOR DEPRESSIVE DISORDER, RECURRENT EPISODE, IN FULL REMISSION (H): ICD-10-CM

## 2019-01-25 PROCEDURE — 99213 OFFICE O/P EST LOW 20 MIN: CPT | Mod: 25 | Performed by: OBSTETRICS & GYNECOLOGY

## 2019-01-25 PROCEDURE — 77063 BREAST TOMOSYNTHESIS BI: CPT | Mod: TC

## 2019-01-25 PROCEDURE — 99396 PREV VISIT EST AGE 40-64: CPT | Performed by: OBSTETRICS & GYNECOLOGY

## 2019-01-25 PROCEDURE — 77067 SCR MAMMO BI INCL CAD: CPT | Mod: TC

## 2019-01-25 RX ORDER — BUPROPION HYDROCHLORIDE 150 MG/1
150 TABLET ORAL EVERY MORNING
Qty: 90 TABLET | Refills: 3 | Status: SHIPPED | OUTPATIENT
Start: 2019-01-25 | End: 2020-02-13

## 2019-01-25 ASSESSMENT — PATIENT HEALTH QUESTIONNAIRE - PHQ9
SUM OF ALL RESPONSES TO PHQ QUESTIONS 1-9: 1
5. POOR APPETITE OR OVEREATING: NOT AT ALL

## 2019-01-25 ASSESSMENT — ANXIETY QUESTIONNAIRES
GAD7 TOTAL SCORE: 0
7. FEELING AFRAID AS IF SOMETHING AWFUL MIGHT HAPPEN: NOT AT ALL
3. WORRYING TOO MUCH ABOUT DIFFERENT THINGS: NOT AT ALL
1. FEELING NERVOUS, ANXIOUS, OR ON EDGE: NOT AT ALL
5. BEING SO RESTLESS THAT IT IS HARD TO SIT STILL: NOT AT ALL
IF YOU CHECKED OFF ANY PROBLEMS ON THIS QUESTIONNAIRE, HOW DIFFICULT HAVE THESE PROBLEMS MADE IT FOR YOU TO DO YOUR WORK, TAKE CARE OF THINGS AT HOME, OR GET ALONG WITH OTHER PEOPLE: NOT DIFFICULT AT ALL
2. NOT BEING ABLE TO STOP OR CONTROL WORRYING: NOT AT ALL
6. BECOMING EASILY ANNOYED OR IRRITABLE: NOT AT ALL

## 2019-01-25 ASSESSMENT — MIFFLIN-ST. JEOR: SCORE: 1449.72

## 2019-01-26 ASSESSMENT — ANXIETY QUESTIONNAIRES: GAD7 TOTAL SCORE: 0

## 2019-10-02 ENCOUNTER — HEALTH MAINTENANCE LETTER (OUTPATIENT)
Age: 59
End: 2019-10-02
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
## 2020-02-13 ENCOUNTER — OFFICE VISIT (OUTPATIENT)
Dept: OBGYN | Facility: CLINIC | Age: 60
End: 2020-02-13

## 2020-02-13 ENCOUNTER — ANCILLARY PROCEDURE (OUTPATIENT)
Dept: MAMMOGRAPHY | Facility: CLINIC | Age: 60
End: 2020-02-13

## 2020-02-13 VITALS
HEART RATE: 70 BPM | BODY MASS INDEX: 29.89 KG/M2 | WEIGHT: 186 LBS | SYSTOLIC BLOOD PRESSURE: 130 MMHG | DIASTOLIC BLOOD PRESSURE: 78 MMHG | HEIGHT: 66 IN

## 2020-02-13 DIAGNOSIS — E78.00 PURE HYPERCHOLESTEROLEMIA: ICD-10-CM

## 2020-02-13 DIAGNOSIS — F33.42 MAJOR DEPRESSIVE DISORDER, RECURRENT EPISODE, IN FULL REMISSION (H): ICD-10-CM

## 2020-02-13 DIAGNOSIS — Z01.419 ENCOUNTER FOR GYNECOLOGICAL EXAMINATION WITHOUT ABNORMAL FINDING: Primary | ICD-10-CM

## 2020-02-13 DIAGNOSIS — Z12.31 VISIT FOR SCREENING MAMMOGRAM: ICD-10-CM

## 2020-02-13 DIAGNOSIS — Z13.228 SCREENING FOR METABOLIC DISORDER: ICD-10-CM

## 2020-02-13 PROCEDURE — 36415 COLL VENOUS BLD VENIPUNCTURE: CPT | Performed by: OBSTETRICS & GYNECOLOGY

## 2020-02-13 PROCEDURE — 77067 SCR MAMMO BI INCL CAD: CPT | Mod: TC

## 2020-02-13 PROCEDURE — 80053 COMPREHEN METABOLIC PANEL: CPT | Performed by: OBSTETRICS & GYNECOLOGY

## 2020-02-13 PROCEDURE — 99396 PREV VISIT EST AGE 40-64: CPT | Performed by: OBSTETRICS & GYNECOLOGY

## 2020-02-13 PROCEDURE — 77063 BREAST TOMOSYNTHESIS BI: CPT | Mod: TC

## 2020-02-13 PROCEDURE — 80061 LIPID PANEL: CPT | Performed by: OBSTETRICS & GYNECOLOGY

## 2020-02-13 RX ORDER — ESCITALOPRAM OXALATE 10 MG/1
10 TABLET ORAL DAILY
Qty: 90 TABLET | Refills: 3 | Status: SHIPPED | OUTPATIENT
Start: 2020-02-13 | End: 2021-01-25

## 2020-02-13 ASSESSMENT — ANXIETY QUESTIONNAIRES
7. FEELING AFRAID AS IF SOMETHING AWFUL MIGHT HAPPEN: NOT AT ALL
1. FEELING NERVOUS, ANXIOUS, OR ON EDGE: NOT AT ALL
6. BECOMING EASILY ANNOYED OR IRRITABLE: SEVERAL DAYS
IF YOU CHECKED OFF ANY PROBLEMS ON THIS QUESTIONNAIRE, HOW DIFFICULT HAVE THESE PROBLEMS MADE IT FOR YOU TO DO YOUR WORK, TAKE CARE OF THINGS AT HOME, OR GET ALONG WITH OTHER PEOPLE: NOT DIFFICULT AT ALL
3. WORRYING TOO MUCH ABOUT DIFFERENT THINGS: NOT AT ALL
2. NOT BEING ABLE TO STOP OR CONTROL WORRYING: NOT AT ALL
GAD7 TOTAL SCORE: 1
5. BEING SO RESTLESS THAT IT IS HARD TO SIT STILL: NOT AT ALL

## 2020-02-13 ASSESSMENT — MIFFLIN-ST. JEOR: SCORE: 1425.44

## 2020-02-13 ASSESSMENT — PATIENT HEALTH QUESTIONNAIRE - PHQ9
5. POOR APPETITE OR OVEREATING: NOT AT ALL
SUM OF ALL RESPONSES TO PHQ QUESTIONS 1-9: 2

## 2020-02-14 LAB
ALBUMIN SERPL-MCNC: 3.9 G/DL (ref 3.4–5)
ALP SERPL-CCNC: 83 U/L (ref 40–150)
ALT SERPL W P-5'-P-CCNC: 26 U/L (ref 0–50)
ANION GAP SERPL CALCULATED.3IONS-SCNC: 8 MMOL/L (ref 3–14)
AST SERPL W P-5'-P-CCNC: 28 U/L (ref 0–45)
BILIRUB SERPL-MCNC: 0.6 MG/DL (ref 0.2–1.3)
BUN SERPL-MCNC: 12 MG/DL (ref 7–30)
CALCIUM SERPL-MCNC: 9.4 MG/DL (ref 8.5–10.1)
CHLORIDE SERPL-SCNC: 104 MMOL/L (ref 94–109)
CHOLEST SERPL-MCNC: 231 MG/DL
CO2 SERPL-SCNC: 28 MMOL/L (ref 20–32)
CREAT SERPL-MCNC: 0.56 MG/DL (ref 0.52–1.04)
GFR SERPL CREATININE-BSD FRML MDRD: >90 ML/MIN/{1.73_M2}
GLUCOSE SERPL-MCNC: 101 MG/DL (ref 70–99)
HDLC SERPL-MCNC: 63 MG/DL
LDLC SERPL CALC-MCNC: 140 MG/DL
NONHDLC SERPL-MCNC: 168 MG/DL
POTASSIUM SERPL-SCNC: 3.6 MMOL/L (ref 3.4–5.3)
PROT SERPL-MCNC: 7.1 G/DL (ref 6.8–8.8)
SODIUM SERPL-SCNC: 140 MMOL/L (ref 133–144)
TRIGL SERPL-MCNC: 141 MG/DL

## 2020-02-14 ASSESSMENT — ANXIETY QUESTIONNAIRES: GAD7 TOTAL SCORE: 1

## 2020-02-14 NOTE — RESULT ENCOUNTER NOTE
Dear LINDA,   The bad cholesterol is going up and the glucose is borderline for diabetes.  So good timing to start with the .  Work on the exercise and diet.  Plan to recheck labs in a year.    Davie Pace MD

## 2021-01-15 ENCOUNTER — HEALTH MAINTENANCE LETTER (OUTPATIENT)
Age: 61
End: 2021-01-15

## 2021-01-25 DIAGNOSIS — F33.42 MAJOR DEPRESSIVE DISORDER, RECURRENT EPISODE, IN FULL REMISSION (H): ICD-10-CM

## 2021-01-25 RX ORDER — ESCITALOPRAM OXALATE 10 MG/1
10 TABLET ORAL DAILY
Qty: 90 TABLET | Refills: 0 | Status: SHIPPED | OUTPATIENT
Start: 2021-01-25 | End: 2021-04-28

## 2021-01-25 NOTE — TELEPHONE ENCOUNTER
"Requested Prescriptions   Pending Prescriptions Disp Refills     escitalopram (LEXAPRO) 10 MG tablet 90 tablet 3     Sig: Take 1 tablet (10 mg) by mouth daily       SSRIs Protocol Failed - 1/25/2021  1:29 PM        Failed - PHQ-9 score less than 5 in past 6 months     Please review last PHQ-9 score.           Failed - Recent (6 mo) or future (30 days) visit within the authorizing provider's specialty     Patient had office visit in the last 6 months or has a visit in the next 30 days with authorizing provider or within the authorizing provider's specialty.  See \"Patient Info\" tab in inbasket, or \"Choose Columns\" in Meds & Orders section of the refill encounter.            Passed - Medication is active on med list        Passed - Patient is age 18 or older        Passed - No active pregnancy on record        Passed - No positive pregnancy test in last 12 months           Last Written Prescription Date:  2/13/20  Last Fill Quantity: 90,  # refills: 3   Last office visit: 2/13/2020 with prescribing provider:  Dr Davie Pace   Future Office Visit:  none          "

## 2021-01-26 NOTE — TELEPHONE ENCOUNTER
Patient needs to be seen before further refills. Last annual in Feb 2020. I sent 90 days to give her time to schedule

## 2021-03-21 ENCOUNTER — HEALTH MAINTENANCE LETTER (OUTPATIENT)
Age: 61
End: 2021-03-21

## 2021-04-07 ENCOUNTER — HOSPITAL ENCOUNTER (OUTPATIENT)
Dept: BONE DENSITY | Facility: CLINIC | Age: 61
Discharge: HOME | End: 2021-04-07
Attending: INTERNAL MEDICINE | Admitting: INTERNAL MEDICINE
Payer: COMMERCIAL

## 2021-04-07 DIAGNOSIS — Z13.820 OSTEOPOROSIS SCREENING: ICD-10-CM

## 2021-04-07 PROCEDURE — 77080 DXA BONE DENSITY AXIAL: CPT

## 2021-04-28 DIAGNOSIS — F33.42 MAJOR DEPRESSIVE DISORDER, RECURRENT EPISODE, IN FULL REMISSION (H): ICD-10-CM

## 2021-04-28 RX ORDER — ESCITALOPRAM OXALATE 10 MG/1
TABLET ORAL
Qty: 30 TABLET | Refills: 0 | Status: SHIPPED | OUTPATIENT
Start: 2021-04-28

## 2021-04-28 NOTE — TELEPHONE ENCOUNTER
"Requested Prescriptions   Pending Prescriptions Disp Refills     escitalopram (LEXAPRO) 10 MG tablet [Pharmacy Med Name: ESCITALOPRAM 10 MG TABLET] 90 tablet 0     Sig: TAKE 1 TABLET BY MOUTH EVERY DAY       SSRIs Protocol Failed - 4/28/2021 12:54 PM        Failed - PHQ-9 score less than 5 in past 6 months     Please review last PHQ-9 score.           Passed - Medication is active on med list        Passed - Patient is age 18 or older        Passed - No active pregnancy on record        Passed - No positive pregnancy test in last 12 months        Passed - Recent (6 mo) or future (30 days) visit within the authorizing provider's specialty     Patient had office visit in the last 6 months or has a visit in the next 30 days with authorizing provider or within the authorizing provider's specialty.  See \"Patient Info\" tab in inbasket, or \"Choose Columns\" in Meds & Orders section of the refill encounter.               Last Written Prescription Date:  1/25/21  Last Fill Quantity: 90,  # refills: 0   Last office visit: 2/13/2020 with prescribing provider:  Dr Pace   Future Office Visit:   Next 5 appointments (look out 90 days)    May 06, 2021 11:00 AM  PHYSICAL with Stefanie Cho Masters, DO  [facility] ([facility] ) [address] [place] [id number] [phone]                 "

## 2021-04-28 NOTE — TELEPHONE ENCOUNTER
PHQ 8/4/2017 1/25/2019 2/13/2020   PHQ-9 Total Score 5 1 2   Q9: Thoughts of better off dead/self-harm past 2 weeks Not at all Not at all Not at all     Medication is being filled for 1 time refill only due to:  Due for phq-9. has upcoming appt on 5/6/21. will give limited refill.     Amna Stover RN

## 2021-05-05 NOTE — PROGRESS NOTES
Dixie is a 60 year old  female who presents for annual exam.     Besides routine health maintenance, she has no other health concerns today .    HPI:  The patient's PCP is  Merlin Emery Brown, MD.      Prior MI pt.    No vb/discharge.    Has lost wt.   Stopped drinking, sober x 3mo now. Has good support. Did see a counselor at one point.     Exercising.    Had dexa.   Mammo today. No symptoms.   Colonoscopy 2016, good for 10yr. No fxh. No symptoms.        GYNECOLOGIC HISTORY:    No LMP recorded. Patient is postmenopausal.    Her current contraception method is: menopause.  She  reports that she has never smoked. She has never used smokeless tobacco.    Patient is sexually active.  STD testing offered?  Declined  Last PHQ-9 score on record =   PHQ-9 SCORE 2021   PHQ-9 Total Score 1     Last GAD7 score on record =   ELISE-7 SCORE 2021   Total Score 2     Alchohol score: 0    HEALTH MAINTENANCE:  Cholesterol:   Recent Labs   Lab Test 20  1130 17  1044   CHOL 231* 223*   HDL 63 69   * 123*   TRIG 141 154*        Last Mammo: One year ago, Result: Normal, Next Mammo: Today   Pap:   Lab Results   Component Value Date    PAP NIL 2016      Colonoscopy:  10/28/2016, Result: Diverticulitis, polyp, Next Colonoscopy: due  .  Dexa:  2021    Health maintenance updated:  yes    HISTORY:  OB History    Para Term  AB Living   5 3 2 1 2 2   SAB TAB Ectopic Multiple Live Births   2 0 0 0 2      # Outcome Date GA Lbr Jerry/2nd Weight Sex Delivery Anes PTL Lv   5       STILLBIRTH      4 SAB            3 SAB            2 Term         SRIRAM   1 Term         SRIRAM       Patient Active Problem List   Diagnosis     Benign essential hypertension     Dysthymia     Past Surgical History:   Procedure Laterality Date      SECTION        Social History     Tobacco Use     Smoking status: Never Smoker     Smokeless tobacco: Never Used   Substance Use Topics     Alcohol  "use: Yes     Alcohol/week: 0.0 standard drinks     Comment: 3 wine a day      Problem (# of Occurrences) Relation (Name,Age of Onset)    Alcoholism (2) Father, Brother    Diabetes (1) Father    Diabetes Type 2  (1) Mother    No Known Problems (7) Sister, Sister, Sister, Maternal Grandmother, Maternal Grandfather, Paternal Grandmother, Other            Current Outpatient Medications   Medication Sig     amphetamine-dextroamphetamine (ADDERALL) 5 MG tablet Take by mouth 2 times daily     escitalopram (LEXAPRO) 10 MG tablet TAKE 1 TABLET BY MOUTH EVERY DAY     lisinopril-hydrochlorothiazide (PRINZIDE/ZESTORETIC) 10-12.5 MG per tablet TAKE 1 TABLET BY MOUTH EVERY DAY. APPOINTMENT NEEDED FOR FURTHER REFILLS.     Multiple Vitamins-Minerals (MULTI ADULT GUMMIES PO)      No current facility-administered medications for this visit.      No Known Allergies    Past medical, surgical, social and family histories were reviewed and updated in EPIC.    ROS:   12 point review of systems negative other than symptoms noted below or in the HPI.      EXAM:  /70   Ht 1.664 m (5' 5.5\")   Wt 79 kg (174 lb 3.2 oz)   BMI 28.55 kg/m     BMI: Body mass index is 28.55 kg/m .    PHYSICAL EXAM:  Constitutional:   Appearance: Well nourished, well developed, alert, in no acute distress  Neck:  Lymph Nodes:  No lymphadenopathy present    Thyroid:  Gland size normal, nontender, no nodules or masses present  on palpation  Chest:  Respiratory Effort:  Breathing unlabored  Cardiovascular:    Heart: Auscultation:  Regular rate, normal rhythm, no murmurs present  Breasts: Inspection of Breasts:  No lymphadenopathy present., Palpation of Breasts and Axillae:  No masses present on palpation, no breast tenderness., Axillary Lymph Nodes:  No lymphadenopathy present. and No nodularity, asymmetry or nipple discharge bilaterally.  Gastrointestinal:   Abdominal Examination:  Abdomen nontender to palpation, tone normal without rigidity or guarding, no " masses present, umbilicus without lesions   Liver and Spleen:  No hepatomegaly present, liver nontender to palpation    Hernias:  No hernias present  Lymphatic: Lymph Nodes:  No other lymphadenopathy present  Skin:  General Inspection:  No rashes present, no lesions present, no areas of  discoloration  Neurologic:    Mental Status:  Oriented X3.  Normal strength and tone, sensory exam                grossly normal, mentation intact and speech normal.    Psychiatric:   Mentation appears normal and affect normal/bright.         Pelvic Exam:  External Genitalia:     Normal appearance for age, no discharge present, no tenderness present, no inflammatory lesions present, color normal  Vagina:     Normal vaginal vault without central or paravaginal defects, no discharge present, no inflammatory lesions present, no masses present  Bladder:     Nontender to palpation  Urethra:   Urethral Body:  Urethra palpation normal, urethra structural support normal   Urethral Meatus:  No erythema or lesions present  Cervix:     Appearance healthy, no lesions present, nontender to palpation, no bleeding present  Uterus:     Uterus: firm, normal sized and nontender, midplane in position.   Adnexa:     No adnexal tenderness present, no adnexal masses present  Perineum:     Perineum within normal limits, no evidence of trauma, no rashes or skin lesions present  Anus:     Anus within normal limits, no hemorrhoids present  Inguinal Lymph Nodes:     No lymphadenopathy present  Pubic Hair:     Normal pubic hair distribution for age  Genitalia and Groin:     No rashes present, no lesions present, no areas of discoloration, no masses present      COUNSELING:   Reviewed preventive health counseling, as reflected in patient instructions       Osteoporosis prevention/bone health    BMI: Body mass index is 28.55 kg/m .  Weight management plan: Discussed healthy diet and exercise guidelines    ASSESSMENT:  60 year old female with satisfactory annual  exam.    ICD-10-CM    1. Encounter for gynecological examination without abnormal finding  Z01.419    2. Screening for cervical cancer  Z12.4 Pap imaged thin layer screen with HPV - recommended age 30 - 65     HPV High Risk Types DNA Cervical       PLAN:  -Pap/hpv obtained for cervical cancer screening. Reviewed guidelines-pap q 3yrs until age 30 when co-testing q 5 years.  -Breast self awareness discussed. Due for mammogram.  -Discussed exercise-making plan, strength training. Nutrition encouraged.  -Colonoscopy UTD, due 2026  -Osteoporosis prevention discussed. Dexa this year.  -PCP manages labs  -PMB precautions  -Return one year for next annual exam          Stefanie Cho Masters, DO

## 2021-05-06 ENCOUNTER — OFFICE VISIT (OUTPATIENT)
Dept: OBGYN | Facility: CLINIC | Age: 61
End: 2021-05-06
Attending: OBSTETRICS & GYNECOLOGY
Payer: COMMERCIAL

## 2021-05-06 ENCOUNTER — ANCILLARY PROCEDURE (OUTPATIENT)
Dept: MAMMOGRAPHY | Facility: CLINIC | Age: 61
End: 2021-05-06
Attending: OBSTETRICS & GYNECOLOGY
Payer: COMMERCIAL

## 2021-05-06 VITALS
BODY MASS INDEX: 28 KG/M2 | HEIGHT: 66 IN | WEIGHT: 174.2 LBS | SYSTOLIC BLOOD PRESSURE: 110 MMHG | DIASTOLIC BLOOD PRESSURE: 70 MMHG

## 2021-05-06 DIAGNOSIS — Z01.419 ENCOUNTER FOR GYNECOLOGICAL EXAMINATION WITHOUT ABNORMAL FINDING: Primary | ICD-10-CM

## 2021-05-06 DIAGNOSIS — Z12.4 SCREENING FOR CERVICAL CANCER: ICD-10-CM

## 2021-05-06 DIAGNOSIS — Z12.31 VISIT FOR SCREENING MAMMOGRAM: ICD-10-CM

## 2021-05-06 PROCEDURE — 87624 HPV HI-RISK TYP POOLED RSLT: CPT | Performed by: OBSTETRICS & GYNECOLOGY

## 2021-05-06 PROCEDURE — 99396 PREV VISIT EST AGE 40-64: CPT | Performed by: OBSTETRICS & GYNECOLOGY

## 2021-05-06 PROCEDURE — 77067 SCR MAMMO BI INCL CAD: CPT | Mod: TC | Performed by: RADIOLOGY

## 2021-05-06 PROCEDURE — G0145 SCR C/V CYTO,THINLAYER,RESCR: HCPCS | Performed by: OBSTETRICS & GYNECOLOGY

## 2021-05-06 RX ORDER — DEXTROAMPHETAMINE SACCHARATE, AMPHETAMINE ASPARTATE, DEXTROAMPHETAMINE SULFATE AND AMPHETAMINE SULFATE 1.25; 1.25; 1.25; 1.25 MG/1; MG/1; MG/1; MG/1
TABLET ORAL 2 TIMES DAILY
COMMUNITY
Start: 2021-03-19

## 2021-05-06 ASSESSMENT — ANXIETY QUESTIONNAIRES
6. BECOMING EASILY ANNOYED OR IRRITABLE: NOT AT ALL
1. FEELING NERVOUS, ANXIOUS, OR ON EDGE: SEVERAL DAYS
2. NOT BEING ABLE TO STOP OR CONTROL WORRYING: NOT AT ALL
3. WORRYING TOO MUCH ABOUT DIFFERENT THINGS: NOT AT ALL
5. BEING SO RESTLESS THAT IT IS HARD TO SIT STILL: SEVERAL DAYS
GAD7 TOTAL SCORE: 2
IF YOU CHECKED OFF ANY PROBLEMS ON THIS QUESTIONNAIRE, HOW DIFFICULT HAVE THESE PROBLEMS MADE IT FOR YOU TO DO YOUR WORK, TAKE CARE OF THINGS AT HOME, OR GET ALONG WITH OTHER PEOPLE: NOT DIFFICULT AT ALL
7. FEELING AFRAID AS IF SOMETHING AWFUL MIGHT HAPPEN: NOT AT ALL

## 2021-05-06 ASSESSMENT — PATIENT HEALTH QUESTIONNAIRE - PHQ9
5. POOR APPETITE OR OVEREATING: NOT AT ALL
SUM OF ALL RESPONSES TO PHQ QUESTIONS 1-9: 1

## 2021-05-06 ASSESSMENT — MIFFLIN-ST. JEOR: SCORE: 1358.98

## 2021-05-06 NOTE — PATIENT INSTRUCTIONS
-Daily total calcium intake (between food/supplements) should be 1200mg which equates to 5 servings calcium containing food per day; VItamin D 1000IU.   Foods rich in calcium are: milk, cheese, yogurt, seafood, sardines and canned salmon, leafy green vegetables such as yuliet greens, spinach and kale, beans and lentils, almonds, seeds (poppy, sesame, celery, douglas), rhubarb, dried fruit such as figs, whey protein, tofu and edamame, amaranth, other foods with added calcium such as orange juice and some cereals.   If adequate amount not taken in diet, then a supplement may be needed.     -I also recommend increasing your dietary fiber by starting Metamucil (powder mixed in glass of water) twice daily

## 2021-05-07 ASSESSMENT — ANXIETY QUESTIONNAIRES: GAD7 TOTAL SCORE: 2

## 2021-05-10 LAB
COPATH REPORT: NORMAL
PAP: NORMAL

## 2021-05-12 LAB
FINAL DIAGNOSIS: NORMAL
HPV HR 12 DNA CVX QL NAA+PROBE: NEGATIVE
HPV16 DNA SPEC QL NAA+PROBE: NEGATIVE
HPV18 DNA SPEC QL NAA+PROBE: NEGATIVE
SPECIMEN DESCRIPTION: NORMAL
SPECIMEN SOURCE CVX/VAG CYTO: NORMAL

## 2021-09-04 ENCOUNTER — HEALTH MAINTENANCE LETTER (OUTPATIENT)
Age: 61
End: 2021-09-04

## 2022-03-04 ENCOUNTER — LAB REQUISITION (OUTPATIENT)
Dept: LAB | Facility: CLINIC | Age: 62
End: 2022-03-04
Payer: COMMERCIAL

## 2022-03-04 DIAGNOSIS — R21 RASH AND OTHER NONSPECIFIC SKIN ERUPTION: ICD-10-CM

## 2022-03-04 LAB
ALBUMIN SERPL-MCNC: 3.9 G/DL (ref 3.4–5)
ALP SERPL-CCNC: 74 U/L (ref 40–150)
ALT SERPL W P-5'-P-CCNC: 24 U/L (ref 0–50)
AST SERPL W P-5'-P-CCNC: 21 U/L (ref 0–45)
BASOPHILS # BLD AUTO: 0 10E3/UL (ref 0–0.2)
BASOPHILS NFR BLD AUTO: 1 %
BILIRUB DIRECT SERPL-MCNC: <0.1 MG/DL (ref 0–0.2)
BILIRUB SERPL-MCNC: 0.3 MG/DL (ref 0.2–1.3)
BUN SERPL-MCNC: 15 MG/DL (ref 7–30)
EOSINOPHIL # BLD AUTO: 0.2 10E3/UL (ref 0–0.7)
EOSINOPHIL NFR BLD AUTO: 4 %
ERYTHROCYTE [DISTWIDTH] IN BLOOD BY AUTOMATED COUNT: 12.2 % (ref 10–15)
HCT VFR BLD AUTO: 41.8 % (ref 35–47)
HGB BLD-MCNC: 13.7 G/DL (ref 11.7–15.7)
IMM GRANULOCYTES # BLD: 0 10E3/UL
IMM GRANULOCYTES NFR BLD: 0 %
LYMPHOCYTES # BLD AUTO: 1.9 10E3/UL (ref 0.8–5.3)
LYMPHOCYTES NFR BLD AUTO: 34 %
MCH RBC QN AUTO: 29.7 PG (ref 26.5–33)
MCHC RBC AUTO-ENTMCNC: 32.8 G/DL (ref 31.5–36.5)
MCV RBC AUTO: 91 FL (ref 78–100)
MONOCYTES # BLD AUTO: 0.4 10E3/UL (ref 0–1.3)
MONOCYTES NFR BLD AUTO: 7 %
NEUTROPHILS # BLD AUTO: 3.2 10E3/UL (ref 1.6–8.3)
NEUTROPHILS NFR BLD AUTO: 54 %
NRBC # BLD AUTO: 0 10E3/UL
NRBC BLD AUTO-RTO: 0 /100
PLATELET # BLD AUTO: 290 10E3/UL (ref 150–450)
PROT SERPL-MCNC: 7 G/DL (ref 6.8–8.8)
RBC # BLD AUTO: 4.61 10E6/UL (ref 3.8–5.2)
WBC # BLD AUTO: 5.7 10E3/UL (ref 4–11)

## 2022-03-04 PROCEDURE — 83516 IMMUNOASSAY NONANTIBODY: CPT | Mod: ORL | Performed by: DERMATOLOGY

## 2022-03-04 PROCEDURE — 80076 HEPATIC FUNCTION PANEL: CPT | Mod: ORL | Performed by: DERMATOLOGY

## 2022-03-04 PROCEDURE — 82784 ASSAY IGA/IGD/IGG/IGM EACH: CPT | Mod: ORL | Performed by: DERMATOLOGY

## 2022-03-04 PROCEDURE — 85025 COMPLETE CBC W/AUTO DIFF WBC: CPT | Mod: ORL | Performed by: DERMATOLOGY

## 2022-03-04 PROCEDURE — 86038 ANTINUCLEAR ANTIBODIES: CPT | Mod: ORL | Performed by: DERMATOLOGY

## 2022-03-04 PROCEDURE — 84520 ASSAY OF UREA NITROGEN: CPT | Mod: ORL | Performed by: DERMATOLOGY

## 2022-03-04 PROCEDURE — 36415 COLL VENOUS BLD VENIPUNCTURE: CPT | Mod: ORL | Performed by: DERMATOLOGY

## 2022-03-07 LAB
ANA SER QL IF: NEGATIVE
GLIADIN IGA SER-ACNC: 1.3 U/ML
GLIADIN IGG SER-ACNC: <0.6 U/ML
IGA SERPL-MCNC: 180 MG/DL (ref 84–499)
TTG IGA SER-ACNC: 0.8 U/ML
TTG IGG SER-ACNC: 1.2 U/ML

## 2022-05-17 NOTE — PROGRESS NOTES
SUBJECTIVE:                                                   Dixie Miller is a 61 year old female who presents to clinic today for the following health issue(s):  Patient presents with:  Vaginal Problem  Abnormal Uterine Bleeding: Cramping, needs to wear a pad, red/ pink in color, took monistat for 1 week did not help, is also itchy no odor light discharge, felt like period was coming back.  1 mth duration          HPI: Patient has been having some PM bleeding for last month.  Was lighter at first now like a cycle.  She at first thought it was maybe associated with the dermatitis the dermatologist is treating her for in the vaginal area.  She did do a monistat over the weekend but did not help at all.  She is also having some cramping.      No LMP recorded. Patient is postmenopausal..     Patient is not sexually active, .  Using menopause for contraception.    reports that she has never smoked. She has never used smokeless tobacco.  STD testing offered?  Declined    Health maintenance updated:  yes    Today's PHQ-2 Score:   PHQ-2 (  [employer]) 2020   Q1: Little interest or pleasure in doing things 0   Q2: Feeling down, depressed or hopeless 0   PHQ-2 Score 0   PHQ-2 Total Score (12-17 Years)- Positive if 3 or more points; Administer PHQ-A if positive 0     Today's PHQ-9 Score:   PHQ-9 SCORE 2021   PHQ-9 Total Score 1     Today's ELISE-7 Score:   ELISE-7 SCORE 2021   Total Score 2       Problem list and histories reviewed & adjusted, as indicated.  Additional history: as documented.    Patient Active Problem List   Diagnosis     Benign essential hypertension     Dysthymia     Past Surgical History:   Procedure Laterality Date      SECTION        Social History     Tobacco Use     Smoking status: Never Smoker     Smokeless tobacco: Never Used   Substance Use Topics     Alcohol use: Yes     Alcohol/week: 0.0 standard drinks     Comment: 3 wine a day      Problem (# of Occurrences)  Relation (Name,Age of Onset)    Alcoholism (2) Father, Brother    Diabetes (1) Father    Diabetes Type 2  (1) Mother    No Known Problems (7) Sister, Sister, Sister, Maternal Grandmother, Maternal Grandfather, Paternal Grandmother, Other            Current Outpatient Medications   Medication Sig     amphetamine-dextroamphetamine (ADDERALL) 5 MG tablet Take by mouth 2 times daily (Patient not taking: Reported on 5/19/2022)     betamethasone dipropionate (DIPROSONE) 0.05 % external cream APPLY TO BODY TWICE A DAY AS NEEDED     clobetasol (TEMOVATE) 0.05 % external cream APPLY TO BODY RASH TWICE DAILY AS NEEDED     escitalopram (LEXAPRO) 10 MG tablet TAKE 1 TABLET BY MOUTH EVERY DAY     lisinopril-hydrochlorothiazide (PRINZIDE/ZESTORETIC) 10-12.5 MG per tablet TAKE 1 TABLET BY MOUTH EVERY DAY. APPOINTMENT NEEDED FOR FURTHER REFILLS.     Multiple Vitamins-Minerals (MULTI ADULT GUMMIES PO)      No current facility-administered medications for this visit.     No Known Allergies    ROS:  12 point review of systems negative other than symptoms noted below or in the HPI.  Genitourinary: Cramps, Spotting and Vaginal Itching  No urinary frequency or dysuria, bladder or kidney problems      OBJECTIVE:     /64   Wt 80.9 kg (178 lb 6.4 oz)   BMI 29.24 kg/m    Body mass index is 29.24 kg/m .    Exam:  Constitutional:  Appearance: Well nourished, well developed alert, in no acute distress  Neurologic:  Mental Status:  Oriented X3.  Normal strength and tone, sensory exam grossly normal, mentation intact and speech normal.    Psychiatric:  Mentation appears normal and affect normal/bright.  Pelvic Exam:  External Genitalia:     Normal appearance for age, no discharge present, no tenderness present, no inflammatory lesions present, color normal  Vagina:     Normal vaginal vault without central or paravaginal defects, no discharge present, no inflammatory lesions present, no masses present  Vaginal culture collected and  sent.  Bladder:     Nontender to palpation  Urethra:   Urethral Body:  Urethra palpation normal, urethra structural support normal   Urethral Meatus:  No erythema or lesions present  Cervix:     Appearance healthy, no lesions present, nontender to palpation, red bleeding present  Uterus:     Uterus: firm, normal sized and nontender, anteverted in position.   Adnexa:     No adnexal tenderness present, no adnexal masses present  Perineum:     Perineum within normal limits, no evidence of trauma, no rashes or skin lesions present  Anus:     Anus within normal limits, no hemorrhoids present  Inguinal Lymph Nodes:     No lymphadenopathy present  Pubic Hair:     Normal pubic hair distribution for age  Genitalia and Groin:     No rashes present, no lesions present, no areas of discoloration, no masses  Pelvic U/S - Transvaginal  [facility]  Referring Provider: Myah Rodriguez NP  Sonographer:  Natalie Ramirez RDMS  Indication: Bleeding/Menses- Postmenopausal bleeding  LMP: No LMP recorded. Patient is postmenopausal.  History:   Gynecological Ultrasonography:   Uterus: anteverted. Contour is smooth/regular.  Size: 8.21 x 5.20 x 4.44 cm  Endometrium: Thickness Total 24.00 mm  Findings: Thick  Right Ovary: Not seen  Left Ovary: 1.87 x 1.61 x .95 cm. Wnl  Cul de Sac Free Fluid: No free fluid     Impression:   Uterus with thickened endometrium measuring 24mm. Recommend further assessment with endometrial sampling.   Right ovary not seen. Left ovary normal. No free fluid.     Stefanie Cho Masters, DO    present       In-Clinic Test Results:  Results for orders placed or performed in visit on 05/18/22 (from the past 24 hour(s))   Bacterial Vaginosis Smear    Specimen: Vagina; Swab    Narrative    The following orders were created for panel order Bacterial Vaginosis Smear.  Procedure                               Abnormality         Status                     ---------                                -----------         ------                     Bacterial Vaginosis Stain[[id number]]    Abnormal            Final result                 Please view results for these tests on the individual orders.   Bacterial Vaginosis Stain    Specimen: Vagina; Swab   Result Value Ref Range    Abiola score No organisms seen (A) 1 , 2 , 3 , 0     White Blood Cells 2+     Narrative    Diagnosis for bacterial vaginosis is done by Gram stain only per the recommendation of National regulatory bodies. A routine bacterial culture will not be done. If clinically relevant, a Group B Streptococcus and/or yeast culture should be requested separately.       ASSESSMENT/PLAN:                                                        ICD-10-CM    1. PMB (postmenopausal bleeding)  N95.0 US Transvaginal Pelvic Non-OB   2. Vaginal irritation  N89.8 Bacterial Vaginosis Smear     Fungal or Yeast Culture Routine       Discussed US results with patient.  To see Dr. Gao 05/19 for endo bx for thickened endometrium.      TRENT Urias [facility] [place]

## 2022-05-18 ENCOUNTER — OFFICE VISIT (OUTPATIENT)
Dept: OBGYN | Facility: CLINIC | Age: 62
End: 2022-05-18

## 2022-05-18 ENCOUNTER — ANCILLARY PROCEDURE (OUTPATIENT)
Dept: ULTRASOUND IMAGING | Facility: CLINIC | Age: 62
End: 2022-05-18
Attending: NURSE PRACTITIONER
Payer: COMMERCIAL

## 2022-05-18 VITALS — WEIGHT: 178.4 LBS | DIASTOLIC BLOOD PRESSURE: 64 MMHG | SYSTOLIC BLOOD PRESSURE: 112 MMHG | BODY MASS INDEX: 29.24 KG/M2

## 2022-05-18 DIAGNOSIS — N95.0 PMB (POSTMENOPAUSAL BLEEDING): Primary | ICD-10-CM

## 2022-05-18 DIAGNOSIS — N89.8 VAGINAL IRRITATION: ICD-10-CM

## 2022-05-18 DIAGNOSIS — N95.0 PMB (POSTMENOPAUSAL BLEEDING): ICD-10-CM

## 2022-05-18 LAB
NUGENT SCORE: ABNORMAL
WHITE BLOOD CELLS: ABNORMAL

## 2022-05-18 PROCEDURE — 87102 FUNGUS ISOLATION CULTURE: CPT | Performed by: NURSE PRACTITIONER

## 2022-05-18 PROCEDURE — 76830 TRANSVAGINAL US NON-OB: CPT | Performed by: OBSTETRICS & GYNECOLOGY

## 2022-05-18 PROCEDURE — 99213 OFFICE O/P EST LOW 20 MIN: CPT | Performed by: NURSE PRACTITIONER

## 2022-05-18 RX ORDER — CLOBETASOL PROPIONATE 0.5 MG/G
CREAM TOPICAL
COMMUNITY
Start: 2022-04-12 | End: 2022-06-24

## 2022-05-18 RX ORDER — BETAMETHASONE DIPROPIONATE 0.5 MG/G
CREAM TOPICAL
COMMUNITY
Start: 2022-02-14 | End: 2022-05-31

## 2022-05-19 ENCOUNTER — OFFICE VISIT (OUTPATIENT)
Dept: OBGYN | Facility: CLINIC | Age: 62
End: 2022-05-19
Payer: COMMERCIAL

## 2022-05-19 VITALS
DIASTOLIC BLOOD PRESSURE: 78 MMHG | SYSTOLIC BLOOD PRESSURE: 122 MMHG | BODY MASS INDEX: 29.33 KG/M2 | HEART RATE: 78 BPM | WEIGHT: 179 LBS

## 2022-05-19 DIAGNOSIS — N95.0 PMB (POSTMENOPAUSAL BLEEDING): Primary | ICD-10-CM

## 2022-05-19 DIAGNOSIS — R93.89 THICKENED ENDOMETRIUM: ICD-10-CM

## 2022-05-19 DIAGNOSIS — C55 UTERINE CARCINOMA (H): ICD-10-CM

## 2022-05-19 PROCEDURE — 88342 IMHCHEM/IMCYTCHM 1ST ANTB: CPT | Mod: 59 | Performed by: PATHOLOGY

## 2022-05-19 PROCEDURE — 88360 TUMOR IMMUNOHISTOCHEM/MANUAL: CPT | Performed by: PATHOLOGY

## 2022-05-19 PROCEDURE — 88341 IMHCHEM/IMCYTCHM EA ADD ANTB: CPT | Mod: 59 | Performed by: PATHOLOGY

## 2022-05-19 PROCEDURE — 88305 TISSUE EXAM BY PATHOLOGIST: CPT | Performed by: PATHOLOGY

## 2022-05-19 PROCEDURE — 58100 BIOPSY OF UTERUS LINING: CPT | Performed by: OBSTETRICS & GYNECOLOGY

## 2022-05-19 PROCEDURE — 88312 SPECIAL STAINS GROUP 1: CPT | Performed by: PATHOLOGY

## 2022-05-19 NOTE — PROGRESS NOTES
EMB-  INDICATIONS:                                                    Is a pregnancy test required: No.  Was a consent obtained?  Yes    Having endometrial biopsy for post-menopausal bleeding     Has had some light bleeding with cramping for the past month.   Hx of regular periods.     No fhx of endometrial ca, both sisters had had hysterectomies. Not sure what for.     Takes MTV, Vit C, vit d, B12, lexapro, adderall. No herbal supplements.   Does drink a lot of herbal tea-Aveda, good earth, mint  BMI 29    Results for orders placed or performed in visit on 05/18/22   US Transvaginal Pelvic Non-OB    Narrative       Gynecological Ultrasound Report  Pelvic U/S - Transvaginal  [facility]  Referring Provider: Myah Rodriguez NP  Sonographer:  Natalie Ramirez RDMS  Indication: Bleeding/Menses- Postmenopausal bleeding  LMP: No LMP recorded. Patient is postmenopausal.  History:   Gynecological Ultrasonography:   Uterus: anteverted. Contour is smooth/regular.  Size: 8.21 x 5.20 x 4.44 cm  Endometrium: Thickness Total 24.00 mm  Findings: Thick  Right Ovary: Not seen  Left Ovary: 1.87 x 1.61 x .95 cm. Wnl  Cul de Sac Free Fluid: No free fluid     Impression:   Uterus with thickened endometrium measuring 24mm. Recommend further   assessment with endometrial sampling.   Right ovary not seen. Left ovary normal. No free fluid.    Stefanie Cho Masters, DO                  PROCEDURE;                                                      A speculum was placed in the vagina and cervix prepped with betadine. A tenaculum was attached to the cervix. A small plastic 5 mm Pipelle syringe curette was not able to be inserted so flexible os finder used. Then pipelle inserted into the cervical canal, however, by tactile feedback, am supsicious that the internal os was not allowing pipelle to pass through.  The uterus was sounded to 6 cm's. A vigorous four quadrant biopsy was performed, removing tissue amount small,  not consistent with history of 24mm ES on US yesterday.  Cervix hemostatic after tenaculum removed with silver nitrate. The speculum was removed. This tissue was placed in Formalin and sent to pathology.    The patient tolerated the procedure  well and she reported there was no cramping.      POST PROCEDURE;                                                      There  was no cramping at the time of discharge. She  tolerated the procedure well. There were no complications. Patient was discharged in stable condition.    Patient advised to call the clinic if severe pelvic pain, fever or heavy bleeding.    She will be notified in mychart in 5-7days of my interpretation of the results and next steps in plan.   Due to the suspicion of incomplete uterine sampling- I suspect internal os stenotic and that the pipelle did not pass through this, and the fact that the amount removed was not consistent with the level of thickness at 24mm-I did discuss that going to the OR for D&C is likely. Will finalize any plans when pathology results return.     Stefanie Cho Masters, DO

## 2022-05-19 NOTE — PATIENT INSTRUCTIONS
Things we discussed:    -we did the biopsy today because you have had bleeding during menopause and because the ultrasound showed the lining of the uterus (endometrium) was 24mm. After menopause we want it to be less than 4mm.    -I do not feel confident a good sample was obtained today. I felt the internal cervix was closed and likely did not allow the catheter to pass and get a good sample. Additionally, this is supported by the amount of sample I obtained not being the appropriate volume I would expect for your enlarged endometrial stripe (should have been a large sample).   I suspect we will need to go to the OR to do a dilation and curettage (D&C) to get a better sample of the uterine tissue, I can also do hysteroscopy where I can look around and sample any other areas which may appear abnormal if present.     I will be in touch within 5-7 days to discuss results and planning next steps.

## 2022-05-23 LAB — BACTERIA SPEC CULT: NO GROWTH

## 2022-05-26 ENCOUNTER — PATIENT OUTREACH (OUTPATIENT)
Dept: ONCOLOGY | Facility: CLINIC | Age: 62
End: 2022-05-26
Payer: COMMERCIAL

## 2022-05-26 ENCOUNTER — TELEPHONE (OUTPATIENT)
Dept: OBGYN | Facility: CLINIC | Age: 62
End: 2022-05-26
Payer: COMMERCIAL

## 2022-05-26 LAB
PATH REPORT.COMMENTS IMP SPEC: NORMAL
PATH REPORT.FINAL DX SPEC: NORMAL
PATH REPORT.GROSS SPEC: NORMAL
PATH REPORT.MICROSCOPIC SPEC OTHER STN: NORMAL
PATH REPORT.RELEVANT HX SPEC: NORMAL
PHOTO IMAGE: NORMAL

## 2022-05-26 NOTE — PROGRESS NOTES
New Patient Hematology / Oncology Nurse Navigator Note     Referral Date: 5/25/22    Referring provider: Stefanie Gao, DO    OB/Gyn    Referral updates and Plan:     Received message from Dr. Good requesting appointment. Dr. Good and Dr. Gao have been in communization regarding referred patient.       Contacted patient to discuss referral. Introduced self and role as nurse navigator. Explained Dr. Good and Dr. Gao have been in communiactions and Dr. Good would be happy to add patient 5/31 at 8AM at [facility]. Patient accepts appointment. Provided contact information if questions. Will route to scheduling to arrange visit and follow-up as needed.    Estrella Estrada, BSN, RN, PHN, OCN  Hematology/Oncology Nurse Navigator  [facility]  [phone]

## 2022-05-26 NOTE — TELEPHONE ENCOUNTER
Pt would like to discuss results. Please call her back      [facility] surgical path results returned: 5/26/22 at 1028  Pt viewed.    Zuleika Sosa RN on 5/26/2022 at 12:54 PM

## 2022-05-26 NOTE — TELEPHONE ENCOUNTER
Called and spoke with pt.     Final Diagnosis   A. 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  -See comment.   Electronically signed by Laurel Pittman on 5/26/2022 at 10:28 AM   Comment  [facility]   The specimen shows multiple fragments of neoplastic tissue with atypical, bizarre nuclei, brisk mitotic activity, apoptotic bodies and focal pseudoglandular formation. Immunohistochemical stains performed with appropriate controls show p16 to be patchy positive and ER, OK and p40 negative. The patchy p16 makes a possible cervical origin unlikely, but does not completely exclude it. Based on the current markers, differential diagnoses include a high grade endometrial carcinoma with prominent squamous differentiation and, although exceedingly rare in the uterine corpus, an adenosquamous carcinoma. A definitive diagnosis should be rendered on the final resection specimen.            Referral to GYN Oncology.    Stefanie Cho Masters, DO

## 2022-05-27 NOTE — PROGRESS NOTES
Consult Notes on Referred Patient        Dr. Stefanie Cho Masters, DO  [address] [facility] [address]  [place] [address]       RE: Dixie Miller  : 1960  DERICK: May 31, 2022    Dear Dr. Stefanie Cho Masters:    I had the pleasure of seeing your patient Dixie Miller here at the Gynecologic Cancer Clinic at the [facility] on 2022.  As you know she is a very pleasant 61 year old woman with a recent diagnosis of high grade endometrial cancer.  Given these findings she was subsequently sent to the Gynecologic Cancer Clinic for new patient consultation.  She is accompanied today by her sister and  as well as her son who is a fourth year medical student at [employer] via telephone.    Patient initially presented with a month history of PMB with cramping.  This was mostly light spotting and she has not had any heavy bleeding. She also has been having a rash which is patchy, itchy and moves to several areas of her body for the past several months.  She had a biopsy which showed non-specific dermatitis.  She has follow up with dermatology for a second opinion later this week.    22:  US pelvis (personally reviewed):  Uterus: anteverted. Contour is smooth/regular. Size: 8.21 x 5.20 x 4.44 cm Endometrium: Thickness Total 24.00 mm Findings: Thick Right Ovary: Not seen Left Ovary: 1.87 x 1.61 x .95 cm. Wnl Cul de Sac Free Fluid: No free fluid    22:  EMB:  High grade carcinoma with squamous differentiation      Obstetrics and Gynecology History:  ,  x 2, c/s x 1  Menopause around age 54, no HRT use      Past Medical History:  Past Medical History:   Diagnosis Date     Anxiety      Benign essential hypertension 2015    added 2nd med 9/15     Depressive disorder      Dysthymia      Endometrial cancer (H)      High cholesterol      IBS (irritable bowel syndrome)        Past Surgical History:  Past Surgical History:   Procedure Laterality Date      SECTION    "      Health Maintenance:  Last Pap Smear: 5/6/21              Result: normal, HPV negative  She has not had a history of abnormal Pap smears.    Last Mammogram: 5/6/21              Result: normal      She has not had a history of abnormal mammograms.    Last Colonoscopy: 10/28/16              Result: Negative, repeat 10 years       Social History:  Lives with her , feels safe at home.  Works in retail part time.  Enjoys swimming, walking, spending time with friends, book clubs.  Does have an advanced directive on file and would like her son, Steve and her Endy to be her POA.  Would like full resuscitation if reversible cause is identified, however would not like to be kept on life sustaining measures long-term.     Family History:   The patient's family history is significant for.  Family History   Problem Relation Age of Onset     Diabetes Type 2  Mother      Diabetes Father      Alcoholism Father      No Known Problems Sister      No Known Problems Sister      No Known Problems Sister      Alcoholism Brother      Multiple myeloma Brother      Pancreatic Cancer Brother      No Known Problems Maternal Grandmother      No Known Problems Maternal Grandfather      No Known Problems Paternal Grandmother      No Known Problems Other      Breast Cancer Maternal Cousin          Physical Exam:   /84 (Cuff Size: Adult Large)   Pulse 85   Temp 97.6  F (36.4  C) (Tympanic)   Resp 16   Ht 1.664 m (5' 5.5\")   Wt 81.2 kg (179 lb)   SpO2 97%   BMI 29.33 kg/m    Body mass index is 29.33 kg/m .    General Appearance: healthy and alert, no distress        Cardiovascular: regular rate and rhythm    Respiratory: lungs clear     Gastrointestinal:       abdomen soft, non-tender, non-distended, no organomegaly or masses    Genitourinary: External genitalia and urethral meatus appears normal.  Vagina is smooth without nodularity or masses.  Cervix appears normal and without lesions.  Bimanual exam reveal no " masses, nodularity or fullness.  Recto-vaginal exam confirms these findings.    Labs:  WBC 6.8 with ANC 4.4.  Hemoglobin 13.7.  Platelets 335.  Creatinine 0.49.  Potassium 4.2.  Magnesium -.  Remainder of electrolytes within normal limits.  AST 28, ALT 29, alkaline phosphatase 71, total bilirubin 0.5.  Albumin 3.7.      Assessment:    Dixie Miller is a 61 year old woman with a new diagnosis of high grade endometrial cancer.     A total of 60 minutes was spent on patient care today.       Plan:     1.)    High grade endometrial cancer-We reviewed her pathology and the natural history and progression of disease.  We discussed that given the high grade histology, we would need to obtain a CT to ensure no metastatic disease.  Assuming the CT is negative for occult metastatic disease, we discussed the standard staging procedure with complete hysterectomy, lymph node assessment and omentectomy. We discussed the role of sentinel lymph node dissection but that if a sentinel lymph node could not be identified bilaterally then a full dissection would be completed on the side/s that do not map.  We briefly discussed the high likelihood of post-operative adjuvant therapy with chemotherapy, radiation or both depending upon final stage.  Risks, benefits, indications and alternatives were reviewed.  All her questions were answered and she will undergo laparoscopic removal of uterus, cervix, bilateral ovaries and fallopian tubes, sentinel lymph node dissection, possible full lymph node dissection, omentectomy, cystoscopy on 6/6 at [facility].      2.) Genetic risk factors were assessed and the patient does not meet the qualifications for a referral based on family history but MMR testing is pending.      3.) Labs and/or tests ordered include:  CBC, CMP, T/S, , EKG, COVID, CT C/A/P.     4.) Health maintenance issues addressed today include pt is up to date.    5.) Pre-op teaching was completed today.        Thank you for  allowing us to participate in the care of your patient.         Sincerely,    Germania Good MD  Gynecologic Oncology  [facility]       CC  MASTERS, EREN THURMAN

## 2022-05-27 NOTE — PROGRESS NOTES
RECORDS STATUS - ALL OTHER DIAGNOSIS      RECORDS RECEIVED FROM: [facility]   DATE RECEIVED: 5/31/2022   NOTES STATUS DETAILS   OFFICE NOTE from referring provider Complete Epic   Masters, Stefanie Cho,    OPERATIVE REPORT Complete See Biopsy Report in EPIC   MEDICATION LIST COmplete [facility]   CLINICAL TRIAL TREATMENTS TO DATE     LABS     PATHOLOGY REPORTS Complete 5/19/2022   A. 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   ANYTHING RELATED TO DIAGNOSIS     GENONOMIC TESTING     TYPE:     IMAGING (NEED IMAGES & REPORT)     CT SCANS     MRI     MAMMO     ULTRASOUND Complete US Transvaginal 5/18/2022   PET

## 2022-05-29 RX ORDER — METRONIDAZOLE 500 MG/100ML
500 INJECTION, SOLUTION INTRAVENOUS
Status: CANCELLED | OUTPATIENT
Start: 2022-05-29

## 2022-05-29 RX ORDER — HEPARIN SODIUM 10000 [USP'U]/ML
5000 INJECTION, SOLUTION INTRAVENOUS; SUBCUTANEOUS
Status: CANCELLED | OUTPATIENT
Start: 2022-05-29

## 2022-05-29 RX ORDER — PHENAZOPYRIDINE HYDROCHLORIDE 100 MG/1
200 TABLET, FILM COATED ORAL ONCE
Status: CANCELLED | OUTPATIENT
Start: 2022-05-29 | End: 2022-05-29

## 2022-05-30 NOTE — PATIENT INSTRUCTIONS
You have been scheduled for surgery on: 6/6 at [facility]    Diagnosis:  Endometrial cancer    The surgical procedure is: laparoscopic removal of uterus, cervix, bilateral ovaries and fallopian tubes, sentinel lymph node dissection, possible full lymph node dissection, omentectomy, cystoscopy    Anticipated length of surgery: 2-3 hrs.  You will be in the recovery room for approximately 2-3 hrs after surgery.  Your family will not be able to see you until after you leave the recovery room.    Length of hospital stay:  This is an outpatient, extended stay surgery.  You will be discharged same day if you meet criteria for discharge.  If you have a larger surgical incision, you will need to be in the hospital 2-3 days.  ______________________________________________________________________    Preparation for Surgery:    To Schedule   1.  Labs:  CBC, CMP, T/S, orders placed, please perform today   2.  CT C/A/P prior to surgery   3.  EKG:  Order placed, please perform today   4.  Post-operative exam with me 1-2 weeks following surgery      Postoperative Restrictions:  No heavy lifting >20lbs for six weeks, nothing in the vagina (no tampons, no intercourse, no douching) for eight weeks.     Postoperative visit:  Return to clinic 1-2 weeks after surgery for post operative visit.      Please contact the clinic with any questions or concerns.    Germania Good MD  Gynecologic Oncology  [facility]     Surgery 6/6/22  Rtn Dr. Good 6/24/22    Abby Parmar, RN, BSN    RN Care Coordinator  [facility]  [phone]

## 2022-05-31 ENCOUNTER — LAB (OUTPATIENT)
Dept: ONCOLOGY | Facility: CLINIC | Age: 62
End: 2022-05-31
Attending: OBSTETRICS & GYNECOLOGY
Payer: COMMERCIAL

## 2022-05-31 ENCOUNTER — PATIENT OUTREACH (OUTPATIENT)
Dept: ONCOLOGY | Facility: CLINIC | Age: 62
End: 2022-05-31

## 2022-05-31 ENCOUNTER — PRE VISIT (OUTPATIENT)
Dept: ONCOLOGY | Facility: CLINIC | Age: 62
End: 2022-05-31

## 2022-05-31 VITALS
HEART RATE: 85 BPM | WEIGHT: 179 LBS | SYSTOLIC BLOOD PRESSURE: 137 MMHG | BODY MASS INDEX: 28.77 KG/M2 | TEMPERATURE: 97.6 F | HEIGHT: 66 IN | DIASTOLIC BLOOD PRESSURE: 84 MMHG | OXYGEN SATURATION: 97 % | RESPIRATION RATE: 16 BRPM

## 2022-05-31 DIAGNOSIS — C54.1 ENDOMETRIAL CANCER (H): ICD-10-CM

## 2022-05-31 DIAGNOSIS — C55 UTERINE CARCINOMA (H): ICD-10-CM

## 2022-05-31 DIAGNOSIS — C54.1 ENDOMETRIAL CANCER (H): Primary | ICD-10-CM

## 2022-05-31 LAB
ABO/RH(D): NORMAL
ALBUMIN SERPL-MCNC: 3.7 G/DL (ref 3.4–5)
ALP SERPL-CCNC: 71 U/L (ref 40–150)
ALT SERPL W P-5'-P-CCNC: 28 U/L (ref 0–50)
ANION GAP SERPL CALCULATED.3IONS-SCNC: 5 MMOL/L (ref 3–14)
ANTIBODY SCREEN: NEGATIVE
AST SERPL W P-5'-P-CCNC: 29 U/L (ref 0–45)
BASOPHILS # BLD AUTO: 0 10E3/UL (ref 0–0.2)
BASOPHILS NFR BLD AUTO: 1 %
BILIRUB SERPL-MCNC: 0.5 MG/DL (ref 0.2–1.3)
BUN SERPL-MCNC: 7 MG/DL (ref 7–30)
CALCIUM SERPL-MCNC: 9.6 MG/DL (ref 8.5–10.1)
CANCER AG125 SERPL-ACNC: 24 U/ML (ref 0–30)
CHLORIDE BLD-SCNC: 107 MMOL/L (ref 94–109)
CO2 SERPL-SCNC: 30 MMOL/L (ref 20–32)
CREAT SERPL-MCNC: 0.49 MG/DL (ref 0.52–1.04)
EOSINOPHIL # BLD AUTO: 0.1 10E3/UL (ref 0–0.7)
EOSINOPHIL NFR BLD AUTO: 2 %
ERYTHROCYTE [DISTWIDTH] IN BLOOD BY AUTOMATED COUNT: 12.5 % (ref 10–15)
GFR SERPL CREATININE-BSD FRML MDRD: >90 ML/MIN/1.73M2
GLUCOSE BLD-MCNC: 113 MG/DL (ref 70–99)
HCT VFR BLD AUTO: 42.7 % (ref 35–47)
HGB BLD-MCNC: 13.7 G/DL (ref 11.7–15.7)
IMM GRANULOCYTES # BLD: 0 10E3/UL
IMM GRANULOCYTES NFR BLD: 0 %
LYMPHOCYTES # BLD AUTO: 1.7 10E3/UL (ref 0.8–5.3)
LYMPHOCYTES NFR BLD AUTO: 25 %
MCH RBC QN AUTO: 29.7 PG (ref 26.5–33)
MCHC RBC AUTO-ENTMCNC: 32.1 G/DL (ref 31.5–36.5)
MCV RBC AUTO: 92 FL (ref 78–100)
MONOCYTES # BLD AUTO: 0.4 10E3/UL (ref 0–1.3)
MONOCYTES NFR BLD AUTO: 6 %
NEUTROPHILS # BLD AUTO: 4.4 10E3/UL (ref 1.6–8.3)
NEUTROPHILS NFR BLD AUTO: 66 %
NRBC # BLD AUTO: 0 10E3/UL
NRBC BLD AUTO-RTO: 0 /100
PLATELET # BLD AUTO: 335 10E3/UL (ref 150–450)
POTASSIUM BLD-SCNC: 4.2 MMOL/L (ref 3.4–5.3)
PROT SERPL-MCNC: 6.8 G/DL (ref 6.8–8.8)
RBC # BLD AUTO: 4.62 10E6/UL (ref 3.8–5.2)
SODIUM SERPL-SCNC: 142 MMOL/L (ref 133–144)
SPECIMEN EXPIRATION DATE: NORMAL
WBC # BLD AUTO: 6.8 10E3/UL (ref 4–11)

## 2022-05-31 PROCEDURE — 86901 BLOOD TYPING SEROLOGIC RH(D): CPT | Performed by: OBSTETRICS & GYNECOLOGY

## 2022-05-31 PROCEDURE — 99205 OFFICE O/P NEW HI 60 MIN: CPT | Performed by: OBSTETRICS & GYNECOLOGY

## 2022-05-31 PROCEDURE — 86304 IMMUNOASSAY TUMOR CA 125: CPT | Performed by: OBSTETRICS & GYNECOLOGY

## 2022-05-31 PROCEDURE — 36415 COLL VENOUS BLD VENIPUNCTURE: CPT

## 2022-05-31 PROCEDURE — G0463 HOSPITAL OUTPT CLINIC VISIT: HCPCS

## 2022-05-31 PROCEDURE — 82040 ASSAY OF SERUM ALBUMIN: CPT | Performed by: OBSTETRICS & GYNECOLOGY

## 2022-05-31 PROCEDURE — 80053 COMPREHEN METABOLIC PANEL: CPT | Performed by: OBSTETRICS & GYNECOLOGY

## 2022-05-31 PROCEDURE — 85014 HEMATOCRIT: CPT | Performed by: OBSTETRICS & GYNECOLOGY

## 2022-05-31 ASSESSMENT — PAIN SCALES - GENERAL: PAINLEVEL: NO PAIN (0)

## 2022-05-31 NOTE — LETTER
2022         RE: Dixie Miller  [address]  [address]  [facility] [address]        Dear Colleague,    Thank you for referring your patient, Dixie Miller, to the [facility]. Please see a copy of my visit note below.    Consult Notes on Referred Patient        Dr. Stefanie Cho Masters, DO  [address]  [place] [address]       RE: Dixie Miller  : 1960  DERICK: May 31, 2022    Dear Dr. Stefanie Cho Masters:    I had the pleasure of seeing your patient Dixie Miller here at the Gynecologic Cancer Clinic at the [facility] on 2022.  As you know she is a very pleasant 61 year old woman with a recent diagnosis of high grade endometrial cancer.  Given these findings she was subsequently sent to the Gynecologic Cancer Clinic for new patient consultation.  She is accompanied today by her sister and  as well as her son who is a fourth year medical student at [employer] via telephone.    Patient initially presented with a month history of PMB with cramping.  This was mostly light spotting and she has not had any heavy bleeding. She also has been having a rash which is patchy, itchy and moves to several areas of her body for the past several months.  She had a biopsy which showed non-specific dermatitis.  She has follow up with dermatology for a second opinion later this week.    22:  US pelvis (personally reviewed):  Uterus: anteverted. Contour is smooth/regular. Size: 8.21 x 5.20 x 4.44 cm Endometrium: Thickness Total 24.00 mm Findings: Thick Right Ovary: Not seen Left Ovary: 1.87 x 1.61 x .95 cm. Wnl Cul de Sac Free Fluid: No free fluid    22:  EMB:  High grade carcinoma with squamous differentiation      Obstetrics and Gynecology History:  ,  x 2, c/s x 1  Menopause around age 54, no HRT use      Past Medical History:  Past Medical History:   Diagnosis Date     Anxiety      Benign essential  "hypertension 2015    added 2nd med 9/15     Depressive disorder      Dysthymia      Endometrial cancer (H)      High cholesterol      IBS (irritable bowel syndrome)        Past Surgical History:  Past Surgical History:   Procedure Laterality Date      SECTION         Health Maintenance:  Last Pap Smear: 21              Result: normal, HPV negative  She has not had a history of abnormal Pap smears.    Last Mammogram: 19              Result: normal      She has not had a history of abnormal mammograms.    Last Colonoscopy: 10/28/16              Result: Negative, repeat 10 years       Social History:  Lives with her , feels safe at home.  Works in retail part time.  Enjoys swimming, walking, spending time with friends, book clubs.  Does have an advanced directive on file and would like her son, Steve and her , Endy to be her POA.  Would like full resuscitation if reversible cause is identified, however would not like to be kept on life sustaining measures long-term.     Family History:   The patient's family history is significant for.  Family History   Problem Relation Age of Onset     Diabetes Type 2  Mother      Diabetes Father      Alcoholism Father      No Known Problems Sister      No Known Problems Sister      No Known Problems Sister      Alcoholism Brother      Multiple myeloma Brother      Pancreatic Cancer Brother      No Known Problems Maternal Grandmother      No Known Problems Maternal Grandfather      No Known Problems Paternal Grandmother      No Known Problems Other      Breast Cancer Maternal Cousin          Physical Exam:   /84 (Cuff Size: Adult Large)   Pulse 85   Temp 97.6  F (36.4  C) (Tympanic)   Resp 16   Ht 1.664 m (5' 5.5\")   Wt 81.2 kg (179 lb)   SpO2 97%   BMI 29.33 kg/m    Body mass index is 29.33 kg/m .    General Appearance: healthy and alert, no distress        Cardiovascular: regular rate and rhythm    Respiratory: lungs " clear     Gastrointestinal:       abdomen soft, non-tender, non-distended, no organomegaly or masses    Genitourinary: External genitalia and urethral meatus appears normal.  Vagina is smooth without nodularity or masses.  Cervix appears normal and without lesions.  Bimanual exam reveal no masses, nodularity or fullness.  Recto-vaginal exam confirms these findings.    Labs:  WBC 6.8 with ANC 4.4.  Hemoglobin 13.7.  Platelets 335.  Creatinine 0.49.  Potassium 4.2.  Magnesium -.  Remainder of electrolytes within normal limits.  AST 28, ALT 29, alkaline phosphatase 71, total bilirubin 0.5.  Albumin 3.7.      Assessment:    Dixie Miller is a 61 year old woman with a new diagnosis of high grade endometrial cancer.     A total of 60 minutes was spent on patient care today.       Plan:     1.)    High grade endometrial cancer-We reviewed her pathology and the natural history and progression of disease.  We discussed that given the high grade histology, we would need to obtain a CT to ensure no metastatic disease.  Assuming the CT is negative for occult metastatic disease, we discussed the standard staging procedure with complete hysterectomy, lymph node assessment and omentectomy. We discussed the role of sentinel lymph node dissection but that if a sentinel lymph node could not be identified bilaterally then a full dissection would be completed on the side/s that do not map.  We briefly discussed the high likelihood of post-operative adjuvant therapy with chemotherapy, radiation or both depending upon final stage.  Risks, benefits, indications and alternatives were reviewed.  All her questions were answered and she will undergo laparoscopic removal of uterus, cervix, bilateral ovaries and fallopian tubes, sentinel lymph node dissection, possible full lymph node dissection, omentectomy, cystoscopy on 6[facility]6 [facility]      2.) Genetic risk factors were assessed and the patient does not meet the qualifications for a  referral based on family history but MMR testing is pending.      3.) Labs and/or tests ordered include:  CBC, CMP, T/S, , EKG, COVID, CT C/A/P.     4.) Health maintenance issues addressed today include pt is up to date.    5.) Pre-op teaching was completed today.        Thank you for allowing us to participate in the care of your patient.         Sincerely,    Germania Good MD  Gynecologic Oncology  [facility]       CC  MASTERS, EREN THURMAN        Again, thank you for allowing me to participate in the care of your patient.        Sincerely,        Jose Alfredo Good MD

## 2022-05-31 NOTE — PROGRESS NOTES
[facility]: Cancer Care Plan of Care Education Note                                    Discussion with Patient:                                                      Surgical Teach  GYN ONC Pre-Op Education - Nursing     Relevant Diagnosis: Endometrial Cancer    Teaching Topic: laparoscopic removal of uterus, cervix, bilateral ovaries and fallopian tubes, sentinel lymph node dissection, possible full lymph node dissection, omentectomy, cystoscopy    Teaching Concerns addressed: Yes    Patient and spouse demonstrate understanding of the following:   Reason for the appointment, diagnosis and treatment plan:   Yes   Knowledge of proper use of medications and conditions for which they are ordered (with special attention to potential side effects or drug interactions): Yes   Which situations necessitate calling provider and whom to contact: Yes       Nutritional needs and diet plan:  Yes      Pain management techniques:  Yes  Diet:  Yes     Infection Prevention:  Patient and spouse demonstrate understanding of the following:   Pre-Op CHG Bathing Instructions: Yes  Surgical procedure site care taught:   Yes   Signs and symptoms of infection taught: Yes     Instructional Materials Used/Given:  [facility] Preparing for Your Surgery  Showering or Bathing before Surgery Instructions & CHG Product (2 bottles)  Home Care after Major Abdominal or Vaginal Surgery  Map  Accommodations Brochure  Tips to Increase the Protein in Your Diet  Phone number for [facility]       Comments:  Met with pt for pre-op teaching for surgery on 6/6 arrival time to be determined. Reviewed NPO restrictions prior to surgery with pt (No food or milk products 8 hours prior to surgery; Only clear liquids up to 2 hours prior to surgery i.e., water, black coffee, tea, apple juice).  Also reviewed medications that must be held for at least 7 days prior to surgery (Aspirin, Ibuprofen, Naproxen, Fish oil/Flax seed oil,  Multivitamin/Vit. E, or any other product containing aspirin, or NSAIDs).  Per Dr Good, pt should take her escitalopram with a small sip of water on the morning of her surgery. Pt will need to see Dr Good, 1-2 weeks after her surgery on 6/21/22 at 12:30. Pt will need someone to drive her home after surgery, and someone to stay with her for at least 24 hours after she arrives home. Reviewed with pt signs and symptoms that would warrant contacting provider immediately.  Also reviewed lifting restrictions after surgery with pt.  Pt had the opportunity to ask questions, and all questions were answered to her satisfaction.  Patient verbalized understanding and agreement with plan.  Pt was instructed to call the clinic with any questions, concerns, or worsening symptoms.         Assessment:                                                      Assessment completed with:: Patient;Spouse or significant other    Current living arrangement  I live in a private home with family    Plan of Care Education   Yearly learning assessment completed?: Yes (see Education tab)  Diagnosis:: Endometrial    Evaluation of Learning       Intervention/Education provided during outreach:                                                       Follow up with patient 1-2 days post op    Signature:  Kenzie Cardenas RN on 5/31/2022 at 10:48 AM

## 2022-05-31 NOTE — NURSING NOTE
"Oncology Rooming Note    May 31, 2022 8:12 AM   Dixie Miller is a 61 year old female who presents for:    Chief Complaint   Patient presents with     Oncology Clinic Visit     New patient     Initial Vitals: /84 (Cuff Size: Adult Large)   Pulse 85   Temp 97.6  F (36.4  C) (Tympanic)   Resp 16   Ht 1.664 m (5' 5.5\")   Wt 81.2 kg (179 lb)   SpO2 97%   BMI 29.33 kg/m   Estimated body mass index is 29.33 kg/m  as calculated from the following:    Height as of this encounter: 1.664 m (5' 5.5\").    Weight as of this encounter: 81.2 kg (179 lb). Body surface area is 1.94 meters squared.  No Pain (0) Comment: Data Unavailable   No LMP recorded. Patient is postmenopausal.  Allergies reviewed: Yes  Medications reviewed: Yes    Medications: Medication refills not needed today.  Pharmacy name entered into RE2: [id number] [place] [id number]    Clinical concerns: an Simmons, ASHLEY              "

## 2022-05-31 NOTE — PROGRESS NOTES
Medical Assistant Note:  Dixie Miller presents today for blood draw.    Patient seen by provider today: Yes: .   present during visit today: Not Applicable.    Concerns: No Concerns.    Procedure:  Lab draw site: left antecub, Needle type: butterfy, Gauge: 21.    Post Assessment:  Labs drawn without difficulty: Yes.    Discharge Plan:  Departure Mode: Ambulatory.    Face to Face Time: 10.    Estefany Mckeon, CMA

## 2022-06-01 ENCOUNTER — TELEPHONE (OUTPATIENT)
Dept: ONCOLOGY | Facility: CLINIC | Age: 62
End: 2022-06-01
Payer: COMMERCIAL

## 2022-06-01 DIAGNOSIS — Z11.59 ENCOUNTER FOR SCREENING FOR OTHER VIRAL DISEASES: Primary | ICD-10-CM

## 2022-06-01 NOTE — TELEPHONE ENCOUNTER
RN Care Coordinator: Isabel Tierney; [phone]    Surgery is scheduled with Dr. Good on 6/6 at the [facility].  Scheduled per ORDERS.    H&P to be completed by Surgeon     COVID-19 test: 6/2 at [facility]    Post-op: will be scheduled by the clinic    Patient will receive a phone call from pre-admission nurses 1-2 days prior to surgery with arrival and start time.    I spoke with the patient and was able to confirm the scheduled information. No further action needed at this time.    Surgery packet was provided by the RN during appointment

## 2022-06-02 ENCOUNTER — NURSE TRIAGE (OUTPATIENT)
Dept: NURSING | Facility: CLINIC | Age: 62
End: 2022-06-02

## 2022-06-02 ENCOUNTER — TELEPHONE (OUTPATIENT)
Dept: NURSING | Facility: CLINIC | Age: 62
End: 2022-06-02

## 2022-06-02 ENCOUNTER — LAB (OUTPATIENT)
Dept: INFUSION THERAPY | Facility: CLINIC | Age: 62
End: 2022-06-02
Attending: OBSTETRICS & GYNECOLOGY
Payer: COMMERCIAL

## 2022-06-02 DIAGNOSIS — Z11.59 ENCOUNTER FOR SCREENING FOR OTHER VIRAL DISEASES: ICD-10-CM

## 2022-06-02 LAB — SARS-COV-2 RNA RESP QL NAA+PROBE: POSITIVE

## 2022-06-02 PROCEDURE — U0003 INFECTIOUS AGENT DETECTION BY NUCLEIC ACID (DNA OR RNA); SEVERE ACUTE RESPIRATORY SYNDROME CORONAVIRUS 2 (SARS-COV-2) (CORONAVIRUS DISEASE [COVID-19]), AMPLIFIED PROBE TECHNIQUE, MAKING USE OF HIGH THROUGHPUT TECHNOLOGIES AS DESCRIBED BY CMS-2020-01-R: HCPCS | Performed by: OBSTETRICS & GYNECOLOGY

## 2022-06-02 NOTE — PROGRESS NOTES
Medical Assistant Note:  Dixie Miller presents today for COVID test   Patient seen by provider today: No.   present during visit today: Not Applicable.    Concerns: No Concerns.    Procedure:     Post Assessment:  Labs drawn without difficulty: Yes.    Discharge Plan:  Departure Mode: Ambulatory.    Face to Face Time: 5.    Shanda Allison CMA

## 2022-06-02 NOTE — TELEPHONE ENCOUNTER
Dr Good. Surgery scheduled for Monday. COVID tested positive today. She is calling to let Dr Good know. CAT scan was not done because of the positive test.   Advised patient to notify surgeon in the am when office reopens, which she agrees to do.    Krysta Murdock RN Triage Nurse Advisor 5:20 PM 6/2/2022

## 2022-06-02 NOTE — TELEPHONE ENCOUNTER
Coronavirus (COVID-19) Notification    Caller Name (Patient, parent, daughter/son, grandparent, etc)      Reason for call  Notify of Positive Coronavirus (COVID-19) lab results, assess symptoms,  review [facility] recommendations    Lab Result    Lab test:  2019-nCoV rRt-PCR or SARS-CoV-2 PCR    Oropharyngeal AND/OR nasopharyngeal swabs is POSITIVE for 2019-nCoV RNA/SARS-COV-2 PCR (COVID-19 virus)      Gather patient reported symptoms   Assessment   Current Symptoms at time of phone call, reported by patient: (if no symptoms, document: No symptoms] None- states tested positive about 3 weeks ago by a home test   Date of symptom(s) onset (if applicable) 5/6/22     If at time of call, Patients symptoms have worsened, the Patient should contact 911 or have someone drive them to Emergency Dept promptly:      If Patient calling 911, inform 911 personal that you have tested positive for the Coronavirus (COVID-19).  Place mask on and await 911 to arrive.    If Emergency Dept, If possible, please have another adult drive you to the Emergency Dept but you need to wear mask when in contact with other people.      Treatment Options:   Patient classified as COVID treatment eligible by Epic high risk algorithm: Yes  Is the patient symptomatic at the time of result notification? No    Review information with Patient    Your result was positive. This means you have COVID-19 (coronavirus).    How can I protect others?    These guidelines are for isolating before returning to work, school or .    If you DO have symptoms    Stay home and away from others     For at least 5 days after your symptoms started, AND    You are fever free for 24 hours (with no medicine that reduces fever), AND    Your other symptoms are better    Wear a mask for 10 full days anytime you are around others    If you DON'T have symptoms    Stay home and away from others for at least 5 days after your positive test    Wear a mask for 10 full days  anytime you are around others    There may be different guidelines for healthcare facilities.  Please check with the specific sites before arriving.    If you have been told by a doctor that you were severely ill with COVID-19 or are immunocompromised, you should isolate for at least 10 days.    You should not go back to work until you meet the guidelines above for ending your home isolation. You don't need to be retested for COVID-19 before going back to work--studies show that you won't spread the virus if it's been at least 10 days since your symptoms started (or 20 days, if you have a weak immune system).    Employers, schools, and daycares: This is an official notice for this person's medical guidelines for returning in-person.  They must meet the above guidelines before going back to work, school or  in person.    You will receive a positive COVID-19 letter via [employer] [place] or the mail soon with additional self-care information (exception, no letters will be sent to presurgical/preprocedure patients).    Would you like me to review some of that information with you now?  No    If you were tested for an upcoming procedure, please contact your provider for next steps.    Meliza Henao LPN

## 2022-06-03 ENCOUNTER — PATIENT OUTREACH (OUTPATIENT)
Dept: INFUSION THERAPY | Facility: CLINIC | Age: 62
End: 2022-06-03
Payer: COMMERCIAL

## 2022-06-03 ENCOUNTER — HOSPITAL ENCOUNTER (OUTPATIENT)
Dept: CT IMAGING | Facility: CLINIC | Age: 62
Discharge: HOME | End: 2022-06-03
Attending: OBSTETRICS & GYNECOLOGY | Admitting: OBSTETRICS & GYNECOLOGY
Payer: COMMERCIAL

## 2022-06-03 ENCOUNTER — ANESTHESIA EVENT (OUTPATIENT)
Dept: SURGERY | Facility: AMBULATORY SURGERY CENTER | Age: 62
End: 2022-06-03
Payer: COMMERCIAL

## 2022-06-03 ENCOUNTER — PATIENT OUTREACH (OUTPATIENT)
Dept: ONCOLOGY | Facility: CLINIC | Age: 62
End: 2022-06-03
Payer: COMMERCIAL

## 2022-06-03 PROCEDURE — 250N000009 HC RX 250: Performed by: OBSTETRICS & GYNECOLOGY

## 2022-06-03 PROCEDURE — 74177 CT ABD & PELVIS W/CONTRAST: CPT

## 2022-06-03 PROCEDURE — 250N000011 HC RX IP 250 OP 636: Performed by: OBSTETRICS & GYNECOLOGY

## 2022-06-03 RX ORDER — SODIUM CHLORIDE, SODIUM LACTATE, POTASSIUM CHLORIDE, CALCIUM CHLORIDE 600; 310; 30; 20 MG/100ML; MG/100ML; MG/100ML; MG/100ML
INJECTION, SOLUTION INTRAVENOUS CONTINUOUS
Status: CANCELLED | OUTPATIENT
Start: 2022-06-03

## 2022-06-03 RX ORDER — IOPAMIDOL 755 MG/ML
88 INJECTION, SOLUTION INTRAVASCULAR ONCE
Status: COMPLETED | OUTPATIENT
Start: 2022-06-03 | End: 2022-06-03

## 2022-06-03 RX ORDER — LIDOCAINE 40 MG/G
CREAM TOPICAL
Status: CANCELLED | OUTPATIENT
Start: 2022-06-03

## 2022-06-03 RX ADMIN — IOPAMIDOL 88 ML: 755 INJECTION, SOLUTION INTRAVENOUS at 15:02

## 2022-06-03 RX ADMIN — SODIUM CHLORIDE 65 ML: 9 INJECTION, SOLUTION INTRAVENOUS at 15:02

## 2022-06-03 NOTE — PROGRESS NOTES
Writer informed the patient of a 2:30 arrival for a 3:00 CT at [facility] today. Patient is waiting to hear from surgery scheduling regarding arrival time for the surgery on Monday.   Kenzie Cardenas RN on 6/3/2022 at 9:55 AM

## 2022-06-03 NOTE — PROGRESS NOTES
[facility]: Cancer Care Short Note                                    Discussion with Patient:                                                      Covid Positve, CT was cancelled and needs to be rescheduled. Update provided to Clarisse and Dr. Good via staff message to determine surgical plan       Assessment:                                                      Assessment completed with:: Patient     NO symptoms of COVID at this time    Constitutional  Fatigue: Absent or within normal limits  Fever: Absent or within normal limits    Respiratory  Cough: Absent or within normal limits  Dyspnea: Absent or within normal limits    Gastrointestinal       Genitourinary       Integumentary       Pain       Patient Coping  Anxiety   Related to delay in CT and surgery  Clinic Utilization       Other Patient Concerns  Other Patient Reported Concerns: Yes, see notes  Patient reports testing positive on 5/6 for symptomatic COVID via home testing. Patient reports testing negative on 5/21 via home test.     Intervention/Education provided during outreach:                                                       Update provided to Dr. Good and surgery scheduling. Follow up with patient in the next 1-2 days with rescheduled plan    Signature:  Kenzie Cardenas RN

## 2022-06-06 ENCOUNTER — HOSPITAL ENCOUNTER (OUTPATIENT)
Facility: AMBULATORY SURGERY CENTER | Age: 62
Discharge: HOME | End: 2022-06-06
Attending: OBSTETRICS & GYNECOLOGY
Payer: COMMERCIAL

## 2022-06-06 ENCOUNTER — ANESTHESIA (OUTPATIENT)
Dept: SURGERY | Facility: AMBULATORY SURGERY CENTER | Age: 62
End: 2022-06-06
Payer: COMMERCIAL

## 2022-06-06 VITALS
WEIGHT: 180 LBS | HEART RATE: 63 BPM | OXYGEN SATURATION: 95 % | DIASTOLIC BLOOD PRESSURE: 65 MMHG | BODY MASS INDEX: 29.99 KG/M2 | HEIGHT: 65 IN | SYSTOLIC BLOOD PRESSURE: 125 MMHG | TEMPERATURE: 97.5 F | RESPIRATION RATE: 16 BRPM

## 2022-06-06 DIAGNOSIS — C54.1 ENDOMETRIAL CANCER (H): ICD-10-CM

## 2022-06-06 DIAGNOSIS — Z90.721 STATUS POST HYSTERECTOMY WITH OOPHORECTOMY: Primary | ICD-10-CM

## 2022-06-06 DIAGNOSIS — Z90.710 STATUS POST HYSTERECTOMY WITH OOPHORECTOMY: Primary | ICD-10-CM

## 2022-06-06 PROCEDURE — 88305 TISSUE EXAM BY PATHOLOGIST: CPT | Mod: TC | Performed by: OBSTETRICS & GYNECOLOGY

## 2022-06-06 PROCEDURE — 88305 TISSUE EXAM BY PATHOLOGIST: CPT | Mod: 26 | Performed by: PATHOLOGY

## 2022-06-06 PROCEDURE — 88112 CYTOPATH CELL ENHANCE TECH: CPT | Mod: 26 | Performed by: PATHOLOGY

## 2022-06-06 PROCEDURE — 88309 TISSUE EXAM BY PATHOLOGIST: CPT | Mod: 26 | Performed by: PATHOLOGY

## 2022-06-06 PROCEDURE — 38570 LAPAROSCOPY LYMPH NODE BIOP: CPT

## 2022-06-06 PROCEDURE — 88307 TISSUE EXAM BY PATHOLOGIST: CPT | Mod: 26 | Performed by: PATHOLOGY

## 2022-06-06 PROCEDURE — 88309 TISSUE EXAM BY PATHOLOGIST: CPT | Mod: TC | Performed by: OBSTETRICS & GYNECOLOGY

## 2022-06-06 PROCEDURE — 58575 LAPS TOT HYST RESJ MAL: CPT

## 2022-06-06 PROCEDURE — 88307 TISSUE EXAM BY PATHOLOGIST: CPT | Mod: TC | Performed by: OBSTETRICS & GYNECOLOGY

## 2022-06-06 PROCEDURE — 88342 IMHCHEM/IMCYTCHM 1ST ANTB: CPT | Mod: 26 | Performed by: PATHOLOGY

## 2022-06-06 PROCEDURE — 88341 IMHCHEM/IMCYTCHM EA ADD ANTB: CPT | Mod: 26 | Performed by: PATHOLOGY

## 2022-06-06 RX ORDER — OXYCODONE HYDROCHLORIDE 5 MG/1
5 TABLET ORAL
Status: COMPLETED | OUTPATIENT
Start: 2022-06-06 | End: 2022-06-06

## 2022-06-06 RX ORDER — LIDOCAINE 40 MG/G
CREAM TOPICAL
Status: DISCONTINUED | OUTPATIENT
Start: 2022-06-06 | End: 2022-06-06 | Stop reason: HOSPADM

## 2022-06-06 RX ORDER — HEPARIN SODIUM 10000 [USP'U]/ML
5000 INJECTION, SOLUTION INTRAVENOUS; SUBCUTANEOUS ONCE
Status: COMPLETED | OUTPATIENT
Start: 2022-06-06 | End: 2022-06-06

## 2022-06-06 RX ORDER — IBUPROFEN 600 MG/1
600 TABLET, FILM COATED ORAL EVERY 6 HOURS PRN
Qty: 12 TABLET | Refills: 0 | Status: SHIPPED | OUTPATIENT
Start: 2022-06-06 | End: 2022-06-24

## 2022-06-06 RX ORDER — OXYCODONE HYDROCHLORIDE 5 MG/1
5 TABLET ORAL EVERY 6 HOURS PRN
Qty: 6 TABLET | Refills: 0 | Status: SHIPPED | OUTPATIENT
Start: 2022-06-06 | End: 2022-06-09

## 2022-06-06 RX ORDER — DEXAMETHASONE SODIUM PHOSPHATE 4 MG/ML
INJECTION, SOLUTION INTRA-ARTICULAR; INTRALESIONAL; INTRAMUSCULAR; INTRAVENOUS; SOFT TISSUE PRN
Status: DISCONTINUED | OUTPATIENT
Start: 2022-06-06 | End: 2022-06-06

## 2022-06-06 RX ORDER — EPHEDRINE SULFATE 50 MG/ML
INJECTION, SOLUTION INTRAMUSCULAR; INTRAVENOUS; SUBCUTANEOUS PRN
Status: DISCONTINUED | OUTPATIENT
Start: 2022-06-06 | End: 2022-06-06

## 2022-06-06 RX ORDER — IBUPROFEN 200 MG
800 TABLET ORAL ONCE
Status: DISCONTINUED | OUTPATIENT
Start: 2022-06-06 | End: 2022-06-07 | Stop reason: HOSPADM

## 2022-06-06 RX ORDER — GLYCOPYRROLATE 0.2 MG/ML
INJECTION, SOLUTION INTRAMUSCULAR; INTRAVENOUS PRN
Status: DISCONTINUED | OUTPATIENT
Start: 2022-06-06 | End: 2022-06-06

## 2022-06-06 RX ORDER — OXYCODONE HYDROCHLORIDE 5 MG/1
5 TABLET ORAL EVERY 4 HOURS PRN
Status: DISCONTINUED | OUTPATIENT
Start: 2022-06-06 | End: 2022-06-07 | Stop reason: HOSPADM

## 2022-06-06 RX ORDER — KETOROLAC TROMETHAMINE 30 MG/ML
INJECTION, SOLUTION INTRAMUSCULAR; INTRAVENOUS PRN
Status: DISCONTINUED | OUTPATIENT
Start: 2022-06-06 | End: 2022-06-06

## 2022-06-06 RX ORDER — ACETAMINOPHEN 325 MG/1
975 TABLET ORAL ONCE
Status: DISCONTINUED | OUTPATIENT
Start: 2022-06-06 | End: 2022-06-07 | Stop reason: HOSPADM

## 2022-06-06 RX ORDER — PROPOFOL 10 MG/ML
INJECTION, EMULSION INTRAVENOUS PRN
Status: DISCONTINUED | OUTPATIENT
Start: 2022-06-06 | End: 2022-06-06

## 2022-06-06 RX ORDER — HYDRALAZINE HYDROCHLORIDE 20 MG/ML
2.5-5 INJECTION INTRAMUSCULAR; INTRAVENOUS EVERY 10 MIN PRN
Status: DISCONTINUED | OUTPATIENT
Start: 2022-06-06 | End: 2022-06-06 | Stop reason: HOSPADM

## 2022-06-06 RX ORDER — PROPOFOL 10 MG/ML
INJECTION, EMULSION INTRAVENOUS CONTINUOUS PRN
Status: DISCONTINUED | OUTPATIENT
Start: 2022-06-06 | End: 2022-06-06

## 2022-06-06 RX ORDER — SODIUM CHLORIDE, SODIUM LACTATE, POTASSIUM CHLORIDE, CALCIUM CHLORIDE 600; 310; 30; 20 MG/100ML; MG/100ML; MG/100ML; MG/100ML
INJECTION, SOLUTION INTRAVENOUS CONTINUOUS
Status: DISCONTINUED | OUTPATIENT
Start: 2022-06-06 | End: 2022-06-07 | Stop reason: HOSPADM

## 2022-06-06 RX ORDER — FENTANYL CITRATE 50 UG/ML
INJECTION, SOLUTION INTRAMUSCULAR; INTRAVENOUS PRN
Status: DISCONTINUED | OUTPATIENT
Start: 2022-06-06 | End: 2022-06-06

## 2022-06-06 RX ORDER — LIDOCAINE HYDROCHLORIDE 20 MG/ML
INJECTION, SOLUTION INFILTRATION; PERINEURAL PRN
Status: DISCONTINUED | OUTPATIENT
Start: 2022-06-06 | End: 2022-06-06

## 2022-06-06 RX ORDER — SODIUM CHLORIDE, SODIUM LACTATE, POTASSIUM CHLORIDE, CALCIUM CHLORIDE 600; 310; 30; 20 MG/100ML; MG/100ML; MG/100ML; MG/100ML
INJECTION, SOLUTION INTRAVENOUS CONTINUOUS
Status: DISCONTINUED | OUTPATIENT
Start: 2022-06-06 | End: 2022-06-06 | Stop reason: HOSPADM

## 2022-06-06 RX ORDER — FENTANYL CITRATE 50 UG/ML
25 INJECTION, SOLUTION INTRAMUSCULAR; INTRAVENOUS
Status: DISCONTINUED | OUTPATIENT
Start: 2022-06-06 | End: 2022-06-07 | Stop reason: HOSPADM

## 2022-06-06 RX ORDER — KETAMINE HYDROCHLORIDE 10 MG/ML
INJECTION INTRAMUSCULAR; INTRAVENOUS PRN
Status: DISCONTINUED | OUTPATIENT
Start: 2022-06-06 | End: 2022-06-06

## 2022-06-06 RX ORDER — CEFAZOLIN SODIUM 2 G/100ML
2 INJECTION, SOLUTION INTRAVENOUS EVERY 8 HOURS
Status: DISCONTINUED | OUTPATIENT
Start: 2022-06-06 | End: 2022-06-07 | Stop reason: HOSPADM

## 2022-06-06 RX ORDER — ACETAMINOPHEN 325 MG/1
650 TABLET ORAL EVERY 6 HOURS PRN
Qty: 24 TABLET | Refills: 0 | Status: SHIPPED | OUTPATIENT
Start: 2022-06-06 | End: 2022-06-24

## 2022-06-06 RX ORDER — HYDROMORPHONE HYDROCHLORIDE 1 MG/ML
0.2 INJECTION, SOLUTION INTRAMUSCULAR; INTRAVENOUS; SUBCUTANEOUS EVERY 5 MIN PRN
Status: DISCONTINUED | OUTPATIENT
Start: 2022-06-06 | End: 2022-06-06 | Stop reason: HOSPADM

## 2022-06-06 RX ORDER — HEPARIN SODIUM 1000 [USP'U]/ML
5000 INJECTION, SOLUTION INTRAVENOUS; SUBCUTANEOUS ONCE
Status: DISCONTINUED | OUTPATIENT
Start: 2022-06-06 | End: 2022-06-06

## 2022-06-06 RX ORDER — ONDANSETRON 4 MG/1
4 TABLET, ORALLY DISINTEGRATING ORAL EVERY 30 MIN PRN
Status: DISCONTINUED | OUTPATIENT
Start: 2022-06-06 | End: 2022-06-07 | Stop reason: HOSPADM

## 2022-06-06 RX ORDER — METRONIDAZOLE 500 MG/100ML
500 INJECTION, SOLUTION INTRAVENOUS ONCE
Status: COMPLETED | OUTPATIENT
Start: 2022-06-06 | End: 2022-06-06

## 2022-06-06 RX ORDER — ONDANSETRON 2 MG/ML
INJECTION INTRAMUSCULAR; INTRAVENOUS PRN
Status: DISCONTINUED | OUTPATIENT
Start: 2022-06-06 | End: 2022-06-06

## 2022-06-06 RX ORDER — INDOCYANINE GREEN AND WATER 25 MG
KIT INJECTION PRN
Status: DISCONTINUED | OUTPATIENT
Start: 2022-06-06 | End: 2022-06-06 | Stop reason: HOSPADM

## 2022-06-06 RX ORDER — HEPARIN SODIUM 1000 [USP'U]/ML
5000 INJECTION, SOLUTION INTRAVENOUS; SUBCUTANEOUS ONCE
Status: DISCONTINUED | OUTPATIENT
Start: 2022-06-06 | End: 2022-06-07 | Stop reason: HOSPADM

## 2022-06-06 RX ORDER — METOPROLOL TARTRATE 1 MG/ML
1-2 INJECTION, SOLUTION INTRAVENOUS EVERY 5 MIN PRN
Status: DISCONTINUED | OUTPATIENT
Start: 2022-06-06 | End: 2022-06-06 | Stop reason: HOSPADM

## 2022-06-06 RX ORDER — FENTANYL CITRATE 50 UG/ML
25 INJECTION, SOLUTION INTRAMUSCULAR; INTRAVENOUS EVERY 5 MIN PRN
Status: DISCONTINUED | OUTPATIENT
Start: 2022-06-06 | End: 2022-06-06 | Stop reason: HOSPADM

## 2022-06-06 RX ORDER — ONDANSETRON 2 MG/ML
4 INJECTION INTRAMUSCULAR; INTRAVENOUS EVERY 30 MIN PRN
Status: DISCONTINUED | OUTPATIENT
Start: 2022-06-06 | End: 2022-06-07 | Stop reason: HOSPADM

## 2022-06-06 RX ADMIN — FENTANYL CITRATE 50 MCG: 50 INJECTION, SOLUTION INTRAMUSCULAR; INTRAVENOUS at 12:16

## 2022-06-06 RX ADMIN — PROPOFOL 200 MCG/KG/MIN: 10 INJECTION, EMULSION INTRAVENOUS at 12:16

## 2022-06-06 RX ADMIN — Medication 50 MG: at 12:16

## 2022-06-06 RX ADMIN — HEPARIN SODIUM 5000 UNITS: 10000 INJECTION, SOLUTION INTRAVENOUS; SUBCUTANEOUS at 11:52

## 2022-06-06 RX ADMIN — DEXAMETHASONE SODIUM PHOSPHATE 4 MG: 4 INJECTION, SOLUTION INTRA-ARTICULAR; INTRALESIONAL; INTRAMUSCULAR; INTRAVENOUS; SOFT TISSUE at 12:16

## 2022-06-06 RX ADMIN — METRONIDAZOLE 500 MG: 500 INJECTION, SOLUTION INTRAVENOUS at 11:45

## 2022-06-06 RX ADMIN — SODIUM CHLORIDE, SODIUM LACTATE, POTASSIUM CHLORIDE, CALCIUM CHLORIDE: 600; 310; 30; 20 INJECTION, SOLUTION INTRAVENOUS at 13:14

## 2022-06-06 RX ADMIN — EPHEDRINE SULFATE 5 MG: 50 INJECTION, SOLUTION INTRAMUSCULAR; INTRAVENOUS; SUBCUTANEOUS at 13:40

## 2022-06-06 RX ADMIN — KETAMINE HYDROCHLORIDE 20 MG: 10 INJECTION INTRAMUSCULAR; INTRAVENOUS at 12:30

## 2022-06-06 RX ADMIN — GLYCOPYRROLATE 0.2 MG: 0.2 INJECTION, SOLUTION INTRAMUSCULAR; INTRAVENOUS at 12:38

## 2022-06-06 RX ADMIN — PROPOFOL 150 MG: 10 INJECTION, EMULSION INTRAVENOUS at 12:16

## 2022-06-06 RX ADMIN — FENTANYL CITRATE 50 MCG: 50 INJECTION, SOLUTION INTRAMUSCULAR; INTRAVENOUS at 12:21

## 2022-06-06 RX ADMIN — Medication 0.5 MG: at 12:45

## 2022-06-06 RX ADMIN — EPHEDRINE SULFATE 5 MG: 50 INJECTION, SOLUTION INTRAMUSCULAR; INTRAVENOUS; SUBCUTANEOUS at 13:16

## 2022-06-06 RX ADMIN — OXYCODONE HYDROCHLORIDE 5 MG: 5 TABLET ORAL at 15:14

## 2022-06-06 RX ADMIN — Medication 10 MG: at 13:40

## 2022-06-06 RX ADMIN — ONDANSETRON 4 MG: 2 INJECTION INTRAMUSCULAR; INTRAVENOUS at 14:30

## 2022-06-06 RX ADMIN — Medication 10 MG: at 13:24

## 2022-06-06 RX ADMIN — KETOROLAC TROMETHAMINE 30 MG: 30 INJECTION, SOLUTION INTRAMUSCULAR; INTRAVENOUS at 14:24

## 2022-06-06 RX ADMIN — Medication 20 MG: at 12:56

## 2022-06-06 RX ADMIN — LIDOCAINE HYDROCHLORIDE 80 MG: 20 INJECTION, SOLUTION INFILTRATION; PERINEURAL at 12:16

## 2022-06-06 RX ADMIN — KETAMINE HYDROCHLORIDE 10 MG: 10 INJECTION INTRAMUSCULAR; INTRAVENOUS at 13:40

## 2022-06-06 RX ADMIN — Medication 10 MG: at 14:03

## 2022-06-06 RX ADMIN — SODIUM CHLORIDE, SODIUM LACTATE, POTASSIUM CHLORIDE, CALCIUM CHLORIDE: 600; 310; 30; 20 INJECTION, SOLUTION INTRAVENOUS at 11:45

## 2022-06-06 ASSESSMENT — LIFESTYLE VARIABLES: TOBACCO_USE: 0

## 2022-06-06 ASSESSMENT — ENCOUNTER SYMPTOMS: SEIZURES: 0

## 2022-06-06 NOTE — OP NOTE
Gynecologic Oncology Operative Report    6/6/2022  Dixie Miller  [id number]    PREOPERATIVE DIAGNOSIS: High grade endometrial adenocarcinoma    POSTOPERATIVE DIAGNOSIS: Same, final pathology pending    PROCEDURES: Total laparoscopic hysterectomy, bilateral salpingo-oophorectomy, bilateral pelvic sentinel lymph node dissection, lysis of adhesions for > 30 minutes, omentectomy, cystoscopy    SURGEON: Germania Good MD     ASSISTANTS:  Violet Bolanos MD, PGY-2, Jordan Khan MD, Fellow.     ANESTHESIA: General endotracheal     ESTIMATED BLOOD LOSS: 500 cc     IV FLUIDS: 1000 cc crystalloid    URINE OUTPUT: 400 cc clear urine     INDICATIONS: Dixie Miller is a 61 year old who presented with PMB.  She underwent a pelvic US which revealed a thickened EMS of 24 mm.  She underwent an EMB which showed high grade carcinoma with squamous differentiation.  She has had screening pap smears including most recently on 5/6/21 with negative HPV results.  She had a CT C/A/P which was negative for metastatic disease.  Following a thorough discussion of the risks, benefits, indications and alternatives she consented to the above procedure.    FINDINGS: On EUA the patient had normal external genitalia and a normal sized, mobile uterus.  On laparoscopy the uterus was slightly enlarged and globular with normal appearing bilateral adnexa.  There were easily identifiable sentinel lymph nodes bilaterally, in the obturator and external iliac region on the right and in the external iliac region on the right.  There were some mild adhesions from the omentum to the anterior abdominal wall in the left upper quadrant but the omentum was otherwise normal in appearance.  There was significant thickening and dense fibrotic tissue of the parametria.  On cystoscopy there was no evidence of bladder injury or foreign body and there was brisk efflux noted from the bilateral ureteral orifices.  There was some raw surfaces along the bladder  peritoneum and the vaginal cuff where we placed some FloSeal with excellent hemostasis.    SPECIMENS:   1.  Pelvic washings  2.  Right obturator and external iliac sentinel lymph nodes  3.  Left external iliac sentinel lymph node  4.  Uterus, cervix, bilateral ovaries and fallopian tubes  5.  Omentum    COMPLICATIONS: None.     CONDITION: Stable to PACU.     PROCEDURE:   Consent was reviewed with the patient in the preoperative setting and confirmed. She received prophylactic antibiotics. In addition, she received heparin for venous thrombosis prevention. The patient was transferred to the operating room and placed in dorsal supine position. General anesthetic was obtained in the usual manner without noted difficulties. The patient was then positioned onto Talat stirrups and an exam under anesthesia was performed with findings as described above.  The patient was prepped and draped for the above-mentioned procedure and Phillips catheter was then placed under sterile techniques.  Timeout was called at which point the patient's name, procedure and operative site was confirmed by the operative team. Initially, the instruments for the vaginal manipulator were positioned, a speculum was placed in the vagina. The anterior lip of the cervix was grasped, the uterus sounded to 8 cm and cervix was serially dilated. The cervix was then injected with 4 cc of ICG at the 3 and 9 o'clock positions.  The Motivanoare vaginal manipulator was then inserted and the vaginal balloon was then inserted to obtain intraabdominal pressure.     Attention was then turned to the upper abdomen. Initially, an incision was made at the umbilicus and the Veress needle introduced through this stab incision. The abdomen was insufflated with an opening pressure of 2 mmHg. The Veress needle was removed. The initial midline 5 mm port was inserted without difficulties and initial study revealed no damage to underlying structures.  The left lateral 12 mm and right  lateral 5 mm port were then placed under direct visualization without any injury noted to underlying structures. At this point, the patient was placed into steep Trendelenburg. The pelvis was inspected as well as the upper abdomen with findings as noted above. Pelvic washings were obtained and sent for cytology. Bowels were packed up into the upper abdomen with gentle traction.     Attention was then turned to the pelvis. The round ligaments were identified bilaterally. They were divided using cautery and the retroperitoneal space was entered by dissecting the peritoneum lateral and cephalad to the IP ligaments. The ureters were identified and a defect was made underneath the IP ligament and above the ureter. The IP ligament was serially cauterized and then divided sharply. The spaces of the bilateral pelvic lymph node dissections were then developed and we easily identified sentinel lymph nodes in the external iliac regions bilaterally and the obturator space on the right.  These sentinel lymph nodes were carefully dissected out ensuring the safety of the surrounding iliac vessels, obturator nerve and ureter.  These lymph nodes were then removed and sent for pathology.  The rest of the peritoneum was skeletonized down to the level of the uterine arteries which were then cauterized and divided.  The bladder flap was created using cautery down to just below the level of the uterine manipulator cup which did require significant lysis of adhesions and dissection to carefully dissect the bladder off the anterior uterus.  There was dense fibrotic tissue circumferentially and two defects were made in th lateral aspects of the cervix bilaterally while we were dissecting this tissue off. The rest of the parametrial tissue was dissected off of the uterus serially cauterized and divided sharply which again required lysis of adhesions. A colpotomy was made on the anterior side and carried around to the posterior side of the  uterine manipulator cup using the electrocautery which was significantly challenging given the dense fibrotic tissue circumferentially which made this significantly challenging to complete the colpotomy. The uterus was removed through the vagina and sent for pathology.  We then brought the omentum down into the pelvis and the LigaSure was used to cauterize and divide the omentum infra-colically ensuring the safety of the transverse colon.  This was then brought through the vagina and sent for pathology.      The vaginal cuff was then closed using the V-Lock suture and the EndoStitch device with excellent reapproximation and good hemostasis. There was some mild oozing along the vaginal cuff and bladder peritoneum where we had performed lysis of adhesions which was easily controlled with the placement of FloSeal.   Next, we performed cystoscopy using 30-degree angled scope and noted normal bladder mucosa, no evidence of foreign body and brisk efflux from the bilateral ureteral orifices. At this point, the vaginal balloon was removed from the vagina. We closed the fascia on the 12 mm incision using the Brandon-Vicente device. All laparoscopic instruments and ports were now removed and CO2 allowed to escape from the abdomen. Skin was reapproximated with 4-0 Vicryl sutures and Dermabond applied. The patient tolerated the procedure well and was taken to the recovery room in stable condition.    Sponge, lap and needle counts were reported as correct x2 and instrument count was correct x1.     Germania Good MD  Gynecologic Oncology  [facility]

## 2022-06-06 NOTE — ANESTHESIA PREPROCEDURE EVALUATION
Anesthesia Pre-Procedure Evaluation    Patient: Dixie Miller   MRN: [id number] : 1960        Procedure : Procedure(s):  laparoscopic removal of uterus, cervix, bilateral ovaries and fallopian tubes, sentinel lymph node dissection, possible full lymph node dissection, omentectomy, cystoscopy          Past Medical History:   Diagnosis Date     Anxiety      Benign essential hypertension     added 2nd med 9/15     Depressive disorder      Dysthymia      Endometrial cancer (H)      High cholesterol      IBS (irritable bowel syndrome)       Past Surgical History:   Procedure Laterality Date      SECTION        No Known Allergies   Social History     Tobacco Use     Smoking status: Never Smoker     Smokeless tobacco: Never Used   Substance Use Topics     Alcohol use: Yes     Alcohol/week: 0.0 standard drinks     Comment: 3 wine a day      Wt Readings from Last 1 Encounters:   22 81.6 kg (180 lb)        Anesthesia Evaluation   Pt has had prior anesthetic.     No history of anesthetic complications       ROS/MED HX  ENT/Pulmonary:     (+) recent URI, resolved (Home COVID test Positive . Symptoms of fatigue at that time, no residual symptoms),  (-) tobacco use and asthma   Neurologic:    (-) no seizures and no CVA   Cardiovascular:     (+) Dyslipidemia hypertension----- (-) CHF, syncope and murmur   METS/Exercise Tolerance: >4 METS    Hematologic:    (-) anemia   Musculoskeletal:    (-) arthritis   GI/Hepatic:    (-) GERD and liver disease   Renal/Genitourinary:    (-) renal disease   Endo:    (-) Type II DM   Psychiatric/Substance Use:     (+) psychiatric history depression and anxiety     Infectious Disease:    (-) Recent Fever   Malignancy:    (-) malignancy   Other:            Physical Exam    Airway        Mallampati: II   TM distance: > 3 FB   Neck ROM: full   Mouth opening: > 3 cm    Respiratory Devices and Support         Dental  no notable dental history          Cardiovascular          Rhythm and rate: regular and normal (-) no murmur    Pulmonary   pulmonary exam normal        breath sounds clear to auscultation           OUTSIDE LABS:  CBC:   Lab Results   Component Value Date    WBC 6.8 05/31/2022    WBC 5.7 03/04/2022    HGB 13.7 05/31/2022    HGB 13.7 03/04/2022    HCT 42.7 05/31/2022    HCT 41.8 03/04/2022     05/31/2022     03/04/2022     BMP:   Lab Results   Component Value Date     05/31/2022     02/13/2020    POTASSIUM 4.2 05/31/2022    POTASSIUM 3.6 02/13/2020    CHLORIDE 107 05/31/2022    CHLORIDE 104 02/13/2020    CO2 30 05/31/2022    CO2 28 02/13/2020    BUN 7 05/31/2022    BUN 15 03/04/2022    CR 0.49 (L) 05/31/2022    CR 0.56 02/13/2020     (H) 05/31/2022     (H) 02/13/2020     COAGS: No results found for: PTT, INR, FIBR  POC: No results found for: BGM, HCG, HCGS  HEPATIC:   Lab Results   Component Value Date    ALBUMIN 3.7 05/31/2022    PROTTOTAL 6.8 05/31/2022    ALT 28 05/31/2022    AST 29 05/31/2022    ALKPHOS 71 05/31/2022    BILITOTAL 0.5 05/31/2022     OTHER:   Lab Results   Component Value Date    A1C 5.5 07/06/2017    JOSÉ 9.6 05/31/2022       Anesthesia Plan    ASA Status:  2   NPO Status:  NPO Appropriate    Anesthesia Type: General.     - Airway: ETT   Induction: Intravenous, Propofol.   Maintenance: Balanced.        Consents    Anesthesia Plan(s) and associated risks, benefits, and realistic alternatives discussed. Questions answered and patient/representative(s) expressed understanding.    - Discussed:     - Discussed with:  Patient      - Extended Intubation/Ventilatory Support Discussed: No.      - Patient is DNR/DNI Status: No    Use of blood products discussed: No .     Postoperative Care    Pain management: IV analgesics, Oral pain medications, Multi-modal analgesia.   PONV prophylaxis: Ondansetron (or other 5HT-3), Dexamethasone or Solumedrol, Background Propofol Infusion     Comments:    Other  Comments: COVID+ at home 5/7, symptoms resolved. Inappropriately tested on 6/2 and remained positive, but asymptomatic. Given 5/7 positive should be safe from an infectious perspective. Discussed potential for increased risk of respiratory complications. Discussed plan for general anesthetic with oral ETT, risks of sore throat, dental damage, post op pain/nausea, rare major complications. Patient voiced understanding of plan and had no further questions.        H&P reviewed: Unable to attach H&P to encounter due to EHR limitations. H&P Update: appropriate H&P reviewed, patient examined. No interval changes since H&P (within 30 days).         Regulo Blanca MD

## 2022-06-06 NOTE — ANESTHESIA POSTPROCEDURE EVALUATION
Patient: Dixie Miller    Procedure: Procedure(s):  laparoscopic removal of uterus, cervix, bilateral ovaries and fallopian tubes, bilateral pelvic sentinel lymph node dissection, lysis of adhesions 30 minutes, omentectomy, cystoscopy       Anesthesia Type:  General    Note:  Disposition: Outpatient   Postop Pain Control: Uneventful            Sign Out: Well controlled pain   PONV: No   Neuro/Psych: Uneventful            Sign Out: Acceptable/Baseline neuro status   Airway/Respiratory: Uneventful            Sign Out: Acceptable/Baseline resp. status   CV/Hemodynamics: Uneventful            Sign Out: Acceptable CV status; No obvious hypovolemia; No obvious fluid overload   Other NRE: NONE   DID A NON-ROUTINE EVENT OCCUR? No           Last vitals:  Vitals Value Taken Time   /90 06/06/22 1515   Temp 36.5  C (97.7  F) 06/06/22 1515   Pulse 64 06/06/22 1516   Resp 16 06/06/22 1516   SpO2 98 % 06/06/22 1516   Vitals shown include unvalidated device data.    Electronically Signed By: Regulo Blanca MD  June 6, 2022  4:22 PM

## 2022-06-06 NOTE — BRIEF OP NOTE
[facility]     Brief Operative Note    Pre-operative diagnosis: Endometrial cancer (H) [C54.1]  Post-operative diagnosis Same as pre-operative diagnosis    Procedure: Procedure(s):  laparoscopic removal of uterus, cervix, bilateral ovaries and fallopian tubes, bilateral pelvic sentinel lymph node dissection, lysis of adhesions 30 minutes, omentectomy, cystoscopy  Surgeon: Surgeon(s) and Role:     * Germania Espinal MD - Primary  Anesthesia: General   Estimated Blood Loss: Less than 100 ml    Drains: None  Specimens:   ID Type Source Tests Collected by Time Destination   1 : Left External Iliac Reinbeck Lymph Node Tissue Lymph Node(s), Reinbeck SURGICAL PATHOLOGY EXAM Germania Espinal MD 6/6/2022 12:55 PM    2 : Right External Iliac Reinbeck Lymph Node Tissue Lymph Node(s), Reinbeck SURGICAL PATHOLOGY EXAM Germania Espinal MD 6/6/2022  1:02 PM    3 : Right Obturator Reinbeck Lymph Node Tissue Lymph Node(s), Reinbeck SURGICAL PATHOLOGY EXAM Germania Espinal MD 6/6/2022  1:04 PM    4 : Uterus, Cervix, Bilateral Fallopian Tubes and Ovaries Tissue Uterus, Cervix, Bilateral Fallopian Tubes & Ovaries SURGICAL PATHOLOGY EXAM Germania Espinal MD 6/6/2022  1:41 PM    5 : Omentum Tissue Omentum SURGICAL PATHOLOGY EXAM Germania Espinal MD 6/6/2022  2:15 PM    A : Pelvic Washings Washings Pelvis NON-GYNECOLOGIC CYTOLOGY Germania Espinal MD 6/6/2022 12:50 PM      Findings:   Enlarged uterus with thickening of the parametrial tissue as well as the vesicouterine peritoneum consistent with extension of tumor from lower uterine segment into cervical and anterior cul-de-sac. Normal appearig bilateral fallopain tubes and ovaries. Bilateral ureters peristalsing. Cystoscopy with no intervesicular lesions or pathology.  Complications: None.  Implants: * No implants in log *    Violet Bolanos MD PGY-2   [facility] Gynecology Oncology    Gyn Onc Pager: [phone]  06/06/22 2:55 PM

## 2022-06-06 NOTE — DISCHARGE INSTRUCTIONS
[facility]  Home Care Following Anesthesia  For 24 hours after surgery:  Get plenty of rest.  A responsible adult must stay with you for at least 24 hours after you leave the surgery center.  Do not drive or use heavy equipment.  If you have weakness or tingling, don't drive or use heavy equipment until this feeling goes away.   Do not drink alcohol.   Avoid strenuous or risky activities.  Ask for help when climbing stairs.  You may feel lightheaded.  IF so, sit for a few minutes before standing.  Have someone help you get up.   If you have nausea (feel sick to your stomach): Drink only clear liquids such as apple juice, ginger ale, broth or 7-Up.  Rest may also help.  Be sure to drink enough fluids.  Move to a regular diet as you feel able.   You may have a slight fever.  Call the doctor if your fever is over 100 F (37.7 C) (taken under the tongue) or lasts longer than 24 hours.  You may have a dry mouth, a sore throat, muscle aches or trouble sleeping. These should go away after 24 hours.  Do not make important or legal decisions.   It is recommended to avoid smoking.               Tips for taking pain medications  To get the best pain relief possible, remember these points:  Take pain medications as directed, before pain becomes severe.  Pain medication can upset your stomach: taking it with food may help.  Constipation is a common side effect of pain medication. Drink plenty of  fluids.  Eat foods high in fiber. Take a stool softener if recommended by your doctor or pharmacist.  Do not drink alcohol, drive or operate machinery while taking pain medications.  Ask about other ways to control pain, such as with heat, ice or relaxation.    Tylenol/Acetaminophen Consumption  To help encourage the safe use of acetaminophen, the makers of TYLENOL  have lowered the maximum daily dose for single-ingredient Extra Strength TYLENOL  (acetaminophen) products sold in the [place] from 8 pills  per day (4,000 mg) to 6 pills per day (3,000 mg). The dosing interval has also changed from 2 pills every 4-6 hours to 2 pills every 6 hours.  If you feel your pain relief is insufficient, you may take Tylenol/Acetaminophen in addition to your narcotic pain medication.   Be careful not to exceed 3,000 mg of Tylenol/Acetaminophen in a 24 hour period from all sources.  If you are taking extra strength Tylenol/acetaminophen (500 mg), the maximum dose is 6 tablets in 24 hours.  If you are taking regular strength acetaminophen (325 mg), the maximum dose is 9 tablets in 24 hours.    Call a doctor for any of the following:  Signs of infection (fever, growing tenderness at the surgery site, a large amount of drainage or bleeding, severe pain, foul-smelling drainage, redness, swelling).  It has been over 8 to 10 hours since surgery and you are still not able to urinate (pass water).  Headache for over 24 hours.  Numbness, tingling or weakness the day after surgery (if you had spinal anesthesia).  Signs of Covid-19 infection (temperature over 100 degrees, shortness of breath, cough, loss of taste/smell, generalized body aches, persistent headache, chills, sore throat, nausea/vomiting/diarrhea)  Your doctor is:       Dr. Ellie Veliz, Gynecologic Oncology: [phone]               Or dial [phone] and ask for the resident on call for:  Gynecologic Oncology  For emergency care, call the:  [place]:  [phone] (TTY for hearing impaired: [phone])

## 2022-06-06 NOTE — ANESTHESIA CARE TRANSFER NOTE
Patient: Dixie Miller    Procedure: Procedure(s):  laparoscopic removal of uterus, cervix, bilateral ovaries and fallopian tubes, bilateral pelvic sentinel lymph node dissection, lysis of adhesions 30 minutes, omentectomy, cystoscopy       Diagnosis: Endometrial cancer (H) [C54.1]  Diagnosis Additional Information: No value filed.    Anesthesia Type:   General     Note:    Oropharynx: oropharynx clear of all foreign objects and spontaneously breathing  Level of Consciousness: awake  Oxygen Supplementation: face mask  Level of Supplemental Oxygen (L/min / FiO2): 6  Independent Airway: airway patency satisfactory and stable  Dentition: dentition unchanged  Vital Signs Stable: post-procedure vital signs reviewed and stable  Report to RN Given: handoff report given  Patient transferred to: PACU    Handoff Report: Identifed the Patient, Identified the Reponsible Provider, Reviewed the pertinent medical history, Discussed the surgical course, Reviewed Intra-OP anesthesia mangement and issues during anesthesia, Set expectations for post-procedure period and Allowed opportunity for questions and acknowledgement of understanding      Vitals:  Vitals Value Taken Time   /73 06/06/22 1500   Temp 36.2  C (97.2  F) 06/06/22 1455   Pulse 49 06/06/22 1501   Resp 28 06/06/22 1501   SpO2 100 % 06/06/22 1501   Vitals shown include unvalidated device data.    Electronically Signed By: BARBARA JEAN  June 6, 2022  3:03 PM

## 2022-06-08 ENCOUNTER — PATIENT OUTREACH (OUTPATIENT)
Dept: ONCOLOGY | Facility: CLINIC | Age: 62
End: 2022-06-08
Payer: COMMERCIAL

## 2022-06-08 LAB
PATH REPORT.COMMENTS IMP SPEC: NORMAL
PATH REPORT.FINAL DX SPEC: NORMAL
PATH REPORT.GROSS SPEC: NORMAL
PATH REPORT.RELEVANT HX SPEC: NORMAL

## 2022-06-08 NOTE — LETTER
Marcy 10, 2022    RE:  Dixie Miller                              [address]    [facility] [address]      [employer]      To Whom It May Concern:    This letter is regarding my patient Ms. Meliza Miller who is under my care following surgery.  Due to her surgical recovery and weight restrictions, she is unable to use the gym for 6 weeks following surgery.  I anticipate that Ms. Miller will be able to return to the gym by July 19, 2022 pending surgical recovery.    Please let me know if you have further questions.      Sincerely,      Dr Germania Veliz MD  [facility]  Department of Gynecologic Oncology

## 2022-06-09 LAB
LAB DIRECTOR COMMENTS: ABNORMAL
LAB DIRECTOR DISCLAIMER: ABNORMAL
LAB DIRECTOR INTERPRETATION: ABNORMAL
LAB DIRECTOR METHODOLOGY: ABNORMAL
LAB DIRECTOR RESULTS: ABNORMAL
PATH REPORT.COMMENTS IMP SPEC: NORMAL
PATH REPORT.FINAL DX SPEC: NORMAL
PATH REPORT.GROSS SPEC: NORMAL
PATH REPORT.MICROSCOPIC SPEC OTHER STN: NORMAL
PATH REPORT.MICROSCOPIC SPEC OTHER STN: NORMAL
PATH REPORT.RELEVANT HX SPEC: NORMAL
PATHOLOGY SYNOPTIC REPORT: NORMAL
PHOTO IMAGE: NORMAL
SPECIMEN DESCRIPTION: ABNORMAL

## 2022-06-09 PROCEDURE — 81288 MLH1 GENE: CPT | Performed by: OBSTETRICS & GYNECOLOGY

## 2022-06-09 PROCEDURE — G0452 MOLECULAR PATHOLOGY INTERPR: HCPCS | Mod: 26 | Performed by: STUDENT IN AN ORGANIZED HEALTH CARE EDUCATION/TRAINING PROGRAM

## 2022-06-09 NOTE — PROGRESS NOTES
Florentino Veliz, MD Kelsy Bui Valerie, RN  I do not usually speak to patients post-op b/c they are too groggy from anesthesia.  I will plan to call her when the pathology returns.  Nothing immediate to report other than we found good sentinel lymph nodes and there was no evidence of cancer spread outside the uterus    Pt called and instructed on message from Dr Good as noted.  MD will plan on calling pt when final path returns and pt will see MD in clinic on 6/21/2022 for post op visit.    Pt requests letter for [employer] stating pt is recovering from surgery and unable to use gym services for 6 weeks.  Pt will call back with fax number and writer will complete letter. Pt aware of plan.  Pt also would like PCP Dr Asad Burdick to receive her records and pt informed she can sign AASHISH at next visit in 2 weeks and we can send them to PCP.  Pt fine with plan.    Isabel Tierney, RN, BSN, OCN

## 2022-06-09 NOTE — PROGRESS NOTES
Received call from pt about FMLA forms and pt informed that writer does have forms and should be completed tomorrow after signature received per Dr Good.  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  Pt informed that typically pt's will require 4- 6 weeks off from work for recovery although possible to return sooner pending surgical recovery and pt aware of plan.    Pt states overall she feels good.  Pt s/p Laparoscopic removal of uterus, cervix, bilateral ovaries and fallopian tubes, bilateral pelvic sentinel LND, lysis of adhesions, omentectomy and cystoscopy with Dr Good on 6/6/2022.  Pt denies feeling lightheaded, eating and drinking okay.  Reports abdominal discomfort with movement or activity, took 1 oxycodone since coming home and using tylenol with relief.  Pt with normal bladder function, reports 1 small BM but took 1 dose of Miralax this morning and instructed she may continue with one dose per day and to call clinic if this is not providing relief.  Pt encouraged to keep up with oral intake/water and to walk around her home.  Denies fevers and states incisions without s/s infection.  *t ntoed small amount of vaginal spotting yesterday but resolved now.      Pt is asking if Dr Good will be calling pt this week and pt informed that MD typically will call pt's with final pathology that can take up to a week but often sooner.  Pt states she does not recall if MD saw pt after surgery but would like to know overall how surgery went.    Pt instructed that writer will notify Dr Good with call and will call pt back.  Pt also verbalized understanding of all instructions as noted above.    Isabel Tierney, RN, BSN, OCN

## 2022-06-10 NOTE — PROGRESS NOTES
Disability forms completed and signed per Dr Good and faxed to elvia Rice at [phone].  Letter for [employer]pleted and will have MD sign on 6/14 when she is in the clinic.    Isabel Tierney RN, BSN, OCN

## 2022-06-13 ENCOUNTER — MYC MEDICAL ADVICE (OUTPATIENT)
Dept: ONCOLOGY | Facility: CLINIC | Age: 62
End: 2022-06-13
Payer: COMMERCIAL

## 2022-06-15 NOTE — TELEPHONE ENCOUNTER
Florentino Veliz, MD Kelsy Bui Valerie, NA; Sarah Simeon, NA; Debi Sterling, RN  We do have this study available at [facility] and she would be eligible.  This is a great option for her but I would need to see her at [facility].  Can we move her to 6/24 at [facility] instead of [facility] 6/21.  Thanks     Debi-Are you able to screen her to see if she can be enrolled on 6/24 for this study through [facility]    Pt called to review, left message to call clinic back tomorrow when open.    Isabel Tierney, NA, BSN, OCN

## 2022-06-16 ENCOUNTER — ALLIED HEALTH/NURSE VISIT (OUTPATIENT)
Dept: ONCOLOGY | Facility: CLINIC | Age: 62
End: 2022-06-16
Payer: COMMERCIAL

## 2022-06-16 DIAGNOSIS — C54.1 ENDOMETRIAL CANCER (H): Primary | ICD-10-CM

## 2022-06-16 DIAGNOSIS — Z00.6 EXAMINATION OF PARTICIPANT IN CLINICAL TRIAL: ICD-10-CM

## 2022-06-16 NOTE — TELEPHONE ENCOUNTER
Return call from pt and discussed my chart message as noted per Dr Godo.  Informed pt that writer will cancel appt with Dr Good on 6/21/2022 and we will arrange appt with MD on 6/24 at [facility].  Writer will follow up with Ignacio research nurse at [place] about possible trial and will call pt back with plan.    Pt also informed that Dr Good signed letter for [employer] post surgery.  Pt received letter though my chart and will print off to bring in although pt informed that copy does not have signature.  Writer will mail copy to pt and pt aware of plan.    Received message back from Debi and she will call pt today to review possible trial and will provide new appt with Dr Good on 6/24.  Pt verbalized understanding of plan and no further questions at this time.    Isabel Tierney, RN, BSN, OCN

## 2022-06-16 NOTE — PROGRESS NOTES
Patient discussed clinical trial  with Dr. Good. Both are interested and patient initially appears eligible. Called the patient today and briefly discussed the study. Informed her of her change in appointment time and location to 6/24 with Dr. Good at [facility]. Patient was in the area and stopped by the cancer clinic today to  a copy of the consent form. She stated she would read it over and discuss with family before formally going over the consent on 6/24 with research RN. Debi Sterling, [facility] Research RN at [facility]

## 2022-06-20 ENCOUNTER — TELEPHONE (OUTPATIENT)
Dept: ONCOLOGY | Facility: CLINIC | Age: 62
End: 2022-06-20

## 2022-06-20 NOTE — TELEPHONE ENCOUNTER
"Writer called to inform her of recommendations from Dr. Good via [facility].    \"I have placed the order for the right diagnostic. She should also get a screening mammogram of the left as she is due for that as well.\"    Writer informed her that these should be done prior to Friday's apt. Writer gave scheduling line info for breast center.    Pt verbalized understanding.  "

## 2022-06-22 ENCOUNTER — HOSPITAL ENCOUNTER (OUTPATIENT)
Dept: MAMMOGRAPHY | Facility: CLINIC | Age: 62
Discharge: HOME | End: 2022-06-22
Attending: OBSTETRICS & GYNECOLOGY
Payer: COMMERCIAL

## 2022-06-22 DIAGNOSIS — N63.0 BREAST MASS: ICD-10-CM

## 2022-06-22 PROCEDURE — 77066 DX MAMMO INCL CAD BI: CPT

## 2022-06-22 PROCEDURE — 76642 ULTRASOUND BREAST LIMITED: CPT | Mod: RT

## 2022-06-23 ENCOUNTER — PATIENT OUTREACH (OUTPATIENT)
Dept: ONCOLOGY | Facility: CLINIC | Age: 62
End: 2022-06-23

## 2022-06-23 NOTE — PROGRESS NOTES
Consult Notes on Referred Patient        Dr. Stefanie Cho Masters, DO  [address] [facility] [address]  [place] [address]       RE: Dixie Miller  : 1960  DERICK: 2022    HPI:  Dixie Miller is 61 year old with stage II, grade 3 endometrial adenocarcinoma, dMMR type.  She is accompanied today by her  as well as her son who is a fourth year medical student at [employer] via telephone. She is doing well post-operatively.  Eating and drinking.  No bowel/bladder concerns.  Pain minimal without pain medications.  Minimal vaginal bleeding.      Cancer Course:  Patient initially presented with a month history of PMB with cramping.  This was mostly light spotting and she has not had any heavy bleeding. She also has been having a rash which is patchy, itchy and moves to several areas of her body for the past several months.  She had a biopsy which showed non-specific dermatitis.  She has follow up with dermatology for a second opinion later this week.    22:  US pelvis:  Uterus: anteverted. Contour is smooth/regular. Size: 8.21 x 5.20 x 4.44 cm Endometrium: Thickness Total 24.00 mm Findings: Thick Right Ovary: Not seen Left Ovary: 1.87 x 1.61 x .95 cm. Wnl Cul de Sac Free Fluid: No free fluid    22:  EMB:  High grade carcinoma with squamous differentiation    22:  : 24    6/3/22:  CT C/A/P (personally reviewed):  1.  1 cm nodule in the right breast centrally. 2.  5 mm on the nodule in the lateral right lower lobe. 3.  Low dense lesion too small to characterize in the liver is probably a cyst. 4.  Markedly thickened endometrium compatible with history. 5.  No adenopathy. 6.  L2 compression deformity.    22:  Total laparoscopic hysterectomy, bilateral salpingo-oophorectomy, bilateral pelvic sentinel lymph node dissection, lysis of adhesions for > 30 minutes, omentectomy, cystoscopy   Pathology:  Grade 3 endometrial adenocarcinoma with extensive squamous differentiation, tumor size  6.6 cm, 9/13 mm myometrial invasion, + JENNIFER, + cervix, +LVSI, 0/11 sentinel LN, negative omentum, Loss of MLH1/PMS2 with + hypermethylation, p53 normal      Obstetrics and Gynecology History:  ,  x 2, c/s x 1  Menopause around age 54, no HRT use      Past Medical History:  Past Medical History:   Diagnosis Date     Anxiety      Benign essential hypertension     added 2nd med 9/15     Depressive disorder      Dysthymia      Endometrial cancer (H)      High cholesterol      IBS (irritable bowel syndrome)        Past Surgical History:  Past Surgical History:   Procedure Laterality Date      SECTION       LAPAROSCOPIC HYSTERECTOMY TOTAL, BILATERAL SALPINGO-OOPHORECTOMY, COMBINED N/A 2022    Procedure: laparoscopic removal of uterus, cervix, bilateral ovaries and fallopian tubes, bilateral pelvic sentinel lymph node dissection, lysis of adhesions 30 minutes, omentectomy, cystoscopy;  Surgeon: Germania Espinal MD;  [place] [facility] [place]       Health Maintenance:  Last Pap Smear: No need for further pap smear exams s/p hysterectomy  She has not had a history of abnormal Pap smears.    Last Mammogram: 22              Result: normal      She has not had a history of abnormal mammograms.    Last Colonoscopy: 10/28/16              Result: Negative, repeat 10 years       Social History:  Lives with her , feels safe at home.  Works in retail part time.  Enjoys swimming, walking, spending time with friends, book clubs.  Does have an advanced directive on file and would like her son, Steve and her , Endy to be her POA.  Would like full resuscitation if reversible cause is identified, however would not like to be kept on life sustaining measures long-term.     Family History:   The patient's family history is significant for.  Family History   Problem Relation Age of Onset     Diabetes Type 2  Mother      Diabetes Father      Alcoholism Father      No Known Problems Sister   "    No Known Problems Sister      No Known Problems Sister      Alcoholism Brother      Multiple myeloma Brother      Pancreatic Cancer Brother      No Known Problems Maternal Grandmother      No Known Problems Maternal Grandfather      No Known Problems Paternal Grandmother      No Known Problems Other      Breast Cancer Maternal Cousin          Physical Exam:   /78   Pulse 76   Resp 16   Ht 1.638 m (5' 4.5\")   Wt 82.6 kg (182 lb)   SpO2 99%   BMI 30.76 kg/m    Body mass index is 30.76 kg/m .    General Appearance: healthy and alert, no distress    Gastrointestinal:       abdomen soft, non-tender, non-distended, no organomegaly or masses, port sites without erythema/induration or drainage    Genitourinary: External genitalia and urethral meatus appears normal.  Vagina is smooth with a well healing vaginal cuff    Labs:  WBC 5.6 with ANC 3.4.  Hemoglobin 12.4.  Platelets 372.  Creatinine 0.55.  Potassium 4.7.  Magnesium -.  Remainder of electrolytes within normal limits.  AST 17, ALT 17, alkaline phosphatase 71, total bilirubin 0.4.  Albumin 3.6.  TSH 2.25       ECOG 0    Assessment:    Dixie Miller is a 61 year old woman with stage II, grade 3 endometrial adenocarcinoma     A total of 30 minutes was spent on patient care today.       Plan:     1.)    Stage II, grade 3 endometrial cancer, dMMR-We reviewed her pathology and surgical findings.  We discussed the standard treatment would be whole pelvic RT but also discussed the option of the clinical trial  which is a RTC comparing standard RT with pembrolizumab/RT (pembrolizumab is given IV every 6 weeks for up to 1 year or 9 cycles).  She is very interested in this clinical trial and would like to proceed with this.  She met with the study team and has met all eligibility criteria.  She signed consents for trial participation and we will await her randomization before initiating her therapy.  She will return for a visit with me either for her " next infusion or following completion of RT pending randomization.  Risks, benefits and side effects of treatment were reviewed.  I also discussed her case with Dr Carson with [facility] who will be planning her RT and plan would be for whole pelvic with brachytherapy given her significant risk factors for local recurrence     2.) Genetic risk factors were assessed and the patient does not meet the qualifications for a referral based on family history and this will be arranged    3.) Labs and/or tests ordered include:  Genetic counseling     4.) Health maintenance issues addressed today include pt is up to date.    5.) Immunotherapy teaching was completed today.        Thank you for allowing us to participate in the care of your patient.         Sincerely,    Germania Good MD  Gynecologic Oncology  [facility]       CC  MASTERS, EREN THURMAN

## 2022-06-24 ENCOUNTER — ONCOLOGY VISIT (OUTPATIENT)
Dept: ONCOLOGY | Facility: CLINIC | Age: 62
End: 2022-06-24
Attending: OBSTETRICS & GYNECOLOGY
Payer: COMMERCIAL

## 2022-06-24 ENCOUNTER — ALLIED HEALTH/NURSE VISIT (OUTPATIENT)
Dept: ONCOLOGY | Facility: CLINIC | Age: 62
End: 2022-06-24

## 2022-06-24 VITALS
HEIGHT: 65 IN | WEIGHT: 182 LBS | DIASTOLIC BLOOD PRESSURE: 78 MMHG | HEART RATE: 76 BPM | SYSTOLIC BLOOD PRESSURE: 133 MMHG | RESPIRATION RATE: 16 BRPM | OXYGEN SATURATION: 99 % | BODY MASS INDEX: 30.32 KG/M2

## 2022-06-24 DIAGNOSIS — C54.1 ENDOMETRIAL CANCER (H): Primary | ICD-10-CM

## 2022-06-24 DIAGNOSIS — N63.0 BREAST MASS: ICD-10-CM

## 2022-06-24 DIAGNOSIS — Z00.6 EXAMINATION OF PARTICIPANT IN CLINICAL TRIAL: Primary | ICD-10-CM

## 2022-06-24 LAB
ALBUMIN SERPL-MCNC: 3.6 G/DL (ref 3.4–5)
ALP SERPL-CCNC: 71 U/L (ref 40–150)
ALT SERPL W P-5'-P-CCNC: 17 U/L (ref 0–50)
ANION GAP SERPL CALCULATED.3IONS-SCNC: <1 MMOL/L (ref 3–14)
AST SERPL W P-5'-P-CCNC: 17 U/L (ref 0–45)
BASOPHILS # BLD AUTO: 0 10E3/UL (ref 0–0.2)
BASOPHILS NFR BLD AUTO: 1 %
BILIRUB SERPL-MCNC: 0.4 MG/DL (ref 0.2–1.3)
BUN SERPL-MCNC: 9 MG/DL (ref 7–30)
CALCIUM SERPL-MCNC: 9.4 MG/DL (ref 8.5–10.1)
CHLORIDE BLD-SCNC: 106 MMOL/L (ref 94–109)
CO2 SERPL-SCNC: 33 MMOL/L (ref 20–32)
CREAT SERPL-MCNC: 0.55 MG/DL (ref 0.52–1.04)
EOSINOPHIL # BLD AUTO: 0.2 10E3/UL (ref 0–0.7)
EOSINOPHIL NFR BLD AUTO: 3 %
ERYTHROCYTE [DISTWIDTH] IN BLOOD BY AUTOMATED COUNT: 12.7 % (ref 10–15)
GFR SERPL CREATININE-BSD FRML MDRD: >90 ML/MIN/1.73M2
GLUCOSE BLD-MCNC: 104 MG/DL (ref 70–99)
HCT VFR BLD AUTO: 37.9 % (ref 35–47)
HGB BLD-MCNC: 12.4 G/DL (ref 11.7–15.7)
IMM GRANULOCYTES # BLD: 0 10E3/UL
IMM GRANULOCYTES NFR BLD: 0 %
LYMPHOCYTES # BLD AUTO: 1.7 10E3/UL (ref 0.8–5.3)
LYMPHOCYTES NFR BLD AUTO: 30 %
MCH RBC QN AUTO: 29.8 PG (ref 26.5–33)
MCHC RBC AUTO-ENTMCNC: 32.7 G/DL (ref 31.5–36.5)
MCV RBC AUTO: 91 FL (ref 78–100)
MONOCYTES # BLD AUTO: 0.4 10E3/UL (ref 0–1.3)
MONOCYTES NFR BLD AUTO: 7 %
NEUTROPHILS # BLD AUTO: 3.4 10E3/UL (ref 1.6–8.3)
NEUTROPHILS NFR BLD AUTO: 59 %
NRBC # BLD AUTO: 0 10E3/UL
NRBC BLD AUTO-RTO: 0 /100
PLATELET # BLD AUTO: 372 10E3/UL (ref 150–450)
POTASSIUM BLD-SCNC: 4.7 MMOL/L (ref 3.4–5.3)
PROT SERPL-MCNC: 6.7 G/DL (ref 6.8–8.8)
RBC # BLD AUTO: 4.16 10E6/UL (ref 3.8–5.2)
SODIUM SERPL-SCNC: 139 MMOL/L (ref 133–144)
TSH SERPL DL<=0.005 MIU/L-ACNC: 2.25 MU/L (ref 0.4–4)
WBC # BLD AUTO: 5.6 10E3/UL (ref 4–11)

## 2022-06-24 PROCEDURE — 84443 ASSAY THYROID STIM HORMONE: CPT | Performed by: OBSTETRICS & GYNECOLOGY

## 2022-06-24 PROCEDURE — 99214 OFFICE O/P EST MOD 30 MIN: CPT | Mod: 24 | Performed by: OBSTETRICS & GYNECOLOGY

## 2022-06-24 PROCEDURE — G0463 HOSPITAL OUTPT CLINIC VISIT: HCPCS

## 2022-06-24 PROCEDURE — 85025 COMPLETE CBC W/AUTO DIFF WBC: CPT | Performed by: OBSTETRICS & GYNECOLOGY

## 2022-06-24 PROCEDURE — 80053 COMPREHEN METABOLIC PANEL: CPT | Performed by: OBSTETRICS & GYNECOLOGY

## 2022-06-24 PROCEDURE — 36415 COLL VENOUS BLD VENIPUNCTURE: CPT | Performed by: OBSTETRICS & GYNECOLOGY

## 2022-06-24 ASSESSMENT — PAIN SCALES - GENERAL: PAINLEVEL: NO PAIN (0)

## 2022-06-24 NOTE — PROGRESS NOTES
Patient signed consent for clinical trial .   Patient came in today to review the main consent form with her  Endy for the study after meeting with Dr. Good. She had the opportunity to review the informed consent document and ask questions. Informed consent form reviewed in its entirety. All questions have been answered to their satisfaction. Shee was given a copy of the signed informed consent document. Copy sent to [facility] to be scanned into patient's chart. Labs for eligibility drawn after pt signed consent. All eligibility criteria met. Dr. Good signed off on eligibility. Discussed randomization date will be determined after radiation and C1D1 clinic visits are set.  Reviewed current medications and past medical history with patient. She clarified IBS diagnosis noted in previous ortho note. She stated she felt she had IBS at one time but did not receive steroids for it. Reports normal bowel movements for years until surgery where she has been experiencing constipation which is currently being managed with OTC PRN medication. Provided patient with writer's card for contact information should she have any questions. Debi Sterling, [facility] Research RN

## 2022-06-24 NOTE — LETTER
2022         RE: Dixie Miller  [address]  [address]  [facility] [address]        Dear Colleague,    Thank you for referring your patient, Dixie Miller, to the [facility]. Please see a copy of my visit note below.    Consult Notes on Referred Patient        Dr. Stefanie Cho Masters, DO  [address]  [place] [address]       RE: Dixie Miller  : 1960  DERICK: 2022    HPI:  Dixie Miller is 61 year old with stage II, grade 3 endometrial adenocarcinoma, dMMR type.  She is accompanied today by her  as well as her son who is a fourth year medical student at [employer] via telephone. She is doing well post-operatively.  Eating and drinking.  No bowel/bladder concerns.  Pain minimal without pain medications.  Minimal vaginal bleeding.      Cancer Course:  Patient initially presented with a month history of PMB with cramping.  This was mostly light spotting and she has not had any heavy bleeding. She also has been having a rash which is patchy, itchy and moves to several areas of her body for the past several months.  She had a biopsy which showed non-specific dermatitis.  She has follow up with dermatology for a second opinion later this week.    22:  US pelvis:  Uterus: anteverted. Contour is smooth/regular. Size: 8.21 x 5.20 x 4.44 cm Endometrium: Thickness Total 24.00 mm Findings: Thick Right Ovary: Not seen Left Ovary: 1.87 x 1.61 x .95 cm. Wnl Cul de Sac Free Fluid: No free fluid    22:  EMB:  High grade carcinoma with squamous differentiation    22:  : 24    6/3/22:  CT C/A/P (personally reviewed):  1.  1 cm nodule in the right breast centrally. 2.  5 mm on the nodule in the lateral right lower lobe. 3.  Low dense lesion too small to characterize in the liver is probably a cyst. 4.  Markedly thickened endometrium compatible with history. 5.  No adenopathy. 6.  L2 compression deformity.    22:   Total laparoscopic hysterectomy, bilateral salpingo-oophorectomy, bilateral pelvic sentinel lymph node dissection, lysis of adhesions for > 30 minutes, omentectomy, cystoscopy   Pathology:  Grade 3 endometrial adenocarcinoma with extensive squamous differentiation, tumor size 6.6 cm, 9/13 mm myometrial invasion, + JENNIFER, + cervix, +LVSI, 0/11 sentinel LN, negative omentum, Loss of MLH1/PMS2 with + hypermethylation, p53 normal      Obstetrics and Gynecology History:  ,  x 2, c/s x 1  Menopause around age 54, no HRT use      Past Medical History:  Past Medical History:   Diagnosis Date     Anxiety      Benign essential hypertension     added 2nd med 9/15     Depressive disorder      Dysthymia      Endometrial cancer (H)      High cholesterol      IBS (irritable bowel syndrome)        Past Surgical History:  Past Surgical History:   Procedure Laterality Date      SECTION       LAPAROSCOPIC HYSTERECTOMY TOTAL, BILATERAL SALPINGO-OOPHORECTOMY, COMBINED N/A 2022    Procedure: laparoscopic removal of uterus, cervix, bilateral ovaries and fallopian tubes, bilateral pelvic sentinel lymph node dissection, lysis of adhesions 30 minutes, omentectomy, cystoscopy;  Surgeon: Germania Espinal MD;  [place] [facility] [place]       Health Maintenance:  Last Pap Smear: No need for further pap smear exams s/p hysterectomy  She has not had a history of abnormal Pap smears.    Last Mammogram: 22              Result: normal      She has not had a history of abnormal mammograms.    Last Colonoscopy: 10/28/16              Result: Negative, repeat 10 years       Social History:  Lives with her , feels safe at home.  Works in retail part time.  Enjoys swimming, walking, spending time with friends, book clubs.  Does have an advanced directive on file and would like her son, Steve and her , Endy to be her POA.  Would like full resuscitation if reversible cause is identified, however would not  "like to be kept on life sustaining measures long-term.     Family History:   The patient's family history is significant for.  Family History   Problem Relation Age of Onset     Diabetes Type 2  Mother      Diabetes Father      Alcoholism Father      No Known Problems Sister      No Known Problems Sister      No Known Problems Sister      Alcoholism Brother      Multiple myeloma Brother      Pancreatic Cancer Brother      No Known Problems Maternal Grandmother      No Known Problems Maternal Grandfather      No Known Problems Paternal Grandmother      No Known Problems Other      Breast Cancer Maternal Cousin          Physical Exam:   /78   Pulse 76   Resp 16   Ht 1.638 m (5' 4.5\")   Wt 82.6 kg (182 lb)   SpO2 99%   BMI 30.76 kg/m    Body mass index is 30.76 kg/m .    General Appearance: healthy and alert, no distress    Gastrointestinal:       abdomen soft, non-tender, non-distended, no organomegaly or masses, port sites without erythema/induration or drainage    Genitourinary: External genitalia and urethral meatus appears normal.  Vagina is smooth with a well healing vaginal cuff    Labs:  WBC 5.6 with ANC 3.4.  Hemoglobin 12.4.  Platelets 372.  Creatinine 0.55.  Potassium 4.7.  Magnesium -.  Remainder of electrolytes within normal limits.  AST 17, ALT 17, alkaline phosphatase 71, total bilirubin 0.4.  Albumin 3.6.  TSH 2.25       ECOG 0    Assessment:    Dixie Miller is a 61 year old woman with stage II, grade 3 endometrial adenocarcinoma     A total of 30 minutes was spent on patient care today.       Plan:     1.)    Stage II, grade 3 endometrial cancer, dMMR-We reviewed her pathology and surgical findings.  We discussed the standard treatment would be whole pelvic RT but also discussed the option of the clinical trial  which is a RTC comparing standard RT with pembrolizumab/RT (pembrolizumab is given IV every 6 weeks for up to 1 year or 9 cycles).  She is very interested in this " "clinical trial and would like to proceed with this.  She met with the study team and has met all eligibility criteria.  She signed consents for trial participation and we will await her randomization before initiating her therapy.  She will return for a visit with me either for her next infusion or following completion of RT pending randomization.  Risks, benefits and side effects of treatment were reviewed.  I also discussed her case with Dr Carson with [facility] who will be planning her RT and plan would be for whole pelvic with brachytherapy given her significant risk factors for local recurrence     2.) Genetic risk factors were assessed and the patient does not meet the qualifications for a referral based on family history and this will be arranged    3.) Labs and/or tests ordered include:  Genetic counseling     4.) Health maintenance issues addressed today include pt is up to date.    5.) Immunotherapy teaching was completed today.        Thank you for allowing us to participate in the care of your patient.         Sincerely,    Germania Good MD  Gynecologic Oncology  [facility]       CC  MASTERS, EREN THURMAN      Oncology Rooming Note    June 24, 2022 8:36 AM   Dixie Miller is a 61 year old female who presents for:    Chief Complaint   Patient presents with     Oncology Clinic Visit     Initial Vitals: /78   Pulse 76   Resp 16   Wt 82.6 kg (182 lb)   SpO2 99%   BMI 30.29 kg/m   Estimated body mass index is 30.29 kg/m  as calculated from the following:    Height as of 6/6/22: 1.651 m (5' 5\").    Weight as of this encounter: 82.6 kg (182 lb). Body surface area is 1.95 meters squared.  Data Unavailable Comment: Data Unavailable   No LMP recorded. Patient is postmenopausal.  Allergies reviewed: Yes  Medications reviewed: Yes    Medications: Medication refills not needed today.  Pharmacy name entered into [employer]: [id number] [place] [id number]    Clinical " concerns: no      Claritza Pacheco, ASHLEY                Again, thank you for allowing me to participate in the care of your patient.        Sincerely,        Jose Alfredo Godo MD

## 2022-06-24 NOTE — PROGRESS NOTES
"Oncology Rooming Note    June 24, 2022 8:36 AM   Dixie Miller is a 61 year old female who presents for:    Chief Complaint   Patient presents with     Oncology Clinic Visit     Initial Vitals: /78   Pulse 76   Resp 16   Wt 82.6 kg (182 lb)   SpO2 99%   BMI 30.29 kg/m   Estimated body mass index is 30.29 kg/m  as calculated from the following:    Height as of 6/6/22: 1.651 m (5' 5\").    Weight as of this encounter: 82.6 kg (182 lb). Body surface area is 1.95 meters squared.  Data Unavailable Comment: Data Unavailable   No LMP recorded. Patient is postmenopausal.  Allergies reviewed: Yes  Medications reviewed: Yes    Medications: Medication refills not needed today.  Pharmacy name entered into [employer]: [id number] [place] [address]    Clinical concerns: no      Claritza Pacheco, ASHLEY            "

## 2022-07-11 NOTE — PROGRESS NOTES
Follow Up Notes on Referred Patient    Date: 2022         RE: Dixie Miller  : 1960  DERICK: 2022          Dixie Miller is a 61 year old woman with stage II, grade 3 endometrial adenocarcinoma, dMMR type.  She is here today for follow up.     Cancer Course:  Patient initially presented with a month history of PMB with cramping.  This was mostly light spotting and she has not had any heavy bleeding. She also has been having a rash which is patchy, itchy and moves to several areas of her body for the past several months.  She had a biopsy which showed non-specific dermatitis.  She has follow up with dermatology for a second opinion later this week.     22:  US pelvis:  Uterus: anteverted. Contour is smooth/regular. Size: 8.21 x 5.20 x 4.44 cm Endometrium: Thickness Total 24.00 mm Findings: Thick Right Ovary: Not seen Left Ovary: 1.87 x 1.61 x .95 cm. Wnl Cul de Sac Free Fluid: No free fluid     22:  EMB:  High grade carcinoma with squamous differentiation     22:  : 24     6/3/22:  CT C/A/P:  1.  1 cm nodule in the right breast centrally. 2.  5 mm on the nodule in the lateral right lower lobe. 3.  Low dense lesion too small to characterize in the liver is probably a cyst. 4.  Markedly thickened endometrium compatible with history. 5.  No adenopathy. 6.  L2 compression deformity.     22:  Total laparoscopic hysterectomy, bilateral salpingo-oophorectomy, bilateral pelvic sentinel lymph node dissection, lysis of adhesions for > 30 minutes, omentectomy, cystoscopy                 Pathology:  Grade 3 endometrial adenocarcinoma with extensive squamous differentiation, tumor size 6.6 cm, 9/13 mm myometrial invasion, + JENNIFER, + cervix, +LVSI, 0/11 sentinel LN, negative omentum, Loss of MLH1/PMS2 with + hypermethylation, p53 normal        Obstetrics and Gynecology History:  ,  x 2, c/s x 1  Menopause around age 54, no HRT use              Today Meliza  comes to the clinic without concerns. She denies any vaginal bleeding, no changes in her bowel or bladder habits, no nausea/emesis, no lower extremity edema, and no difficulties eating or sleeping. She denies any abdominal discomfort/bloating, no fevers or chills, and no chest pain or shortness of breath. Pt has consultation with radiation oncology tomorrow through [facility] with Dr. Carson. On Lexapro for anxiety which is controlled.                 Review of Systems:    Systemic           no weight changes; no fever; no chills; no night sweats; no appetite changes  Skin           no rashes, or lesions  Eye           no irritation; no changes in vision  Kika-Laryngeal           no dysphagia; no hoarseness   Pulmonary    no cough; no shortness of breath  Cardiovascular    no chest pain; no palpitations  Gastrointestinal    no diarrhea; no constipation; no abdominal pain; no changes in bowel  habits; no blood in stool  Genitourinary   no urinary frequency; no urinary urgency; no dysuria; no pain; no abnormal vaginal discharge; no abnormal vaginal bleeding  Breast    no breast discharge; no breast changes; no breast pain  Musculoskeletal    no myalgias; no arthralgias; no back pain  Psychiatric           no depressed mood; no anxiety    Hematologic           no tender lymph nodes; no noticeable swellings or lumps   Endocrine    no hot flashes; no heat/cold intolerance         Neurological   no tremor; no numbness and tingling; no headaches; no difficulty  sleeping      Past Medical History:    Past Medical History:   Diagnosis Date     Anxiety      Benign essential hypertension     added 2nd med 9/15     Depressive disorder      Dysthymia      Endometrial cancer (H)      High cholesterol      IBS (irritable bowel syndrome)          Past Surgical History:    Past Surgical History:   Procedure Laterality Date      SECTION       LAPAROSCOPIC HYSTERECTOMY TOTAL, BILATERAL  SALPINGO-OOPHORECTOMY, COMBINED N/A 6/6/2022    Procedure: laparoscopic removal of uterus, cervix, bilateral ovaries and fallopian tubes, bilateral pelvic sentinel lymph node dissection, lysis of adhesions 30 minutes, omentectomy, cystoscopy;  Surgeon: Germania Espinal MD;  [place] [facility] [place]         Health Maintenance Due   Topic Date Due     ADVANCE CARE PLANNING  Never done     DEPRESSION ACTION PLAN  Never done     HIV SCREENING  Never done     ZOSTER IMMUNIZATION (1 of 2) Never done     PHQ-9  11/06/2021     COVID-19 Vaccine (4 - Booster for Moderna series) 04/09/2022       Current Medications:     Current Outpatient Medications   Medication Sig Dispense Refill     amphetamine-dextroamphetamine (ADDERALL) 5 MG tablet Take by mouth 2 times daily       escitalopram (LEXAPRO) 10 MG tablet TAKE 1 TABLET BY MOUTH EVERY DAY 30 tablet 0     Multiple Vitamins-Minerals (MULTI ADULT GUMMIES PO)            Allergies:      No Known Allergies     Social History:     Social History     Tobacco Use     Smoking status: Never Smoker     Smokeless tobacco: Never Used   Substance Use Topics     Alcohol use: Yes     Alcohol/week: 0.0 standard drinks     Comment: 3 wine a day       History   Drug Use No         Family History:     The patient's family history is notable for     Family History   Problem Relation Age of Onset     Diabetes Type 2  Mother      Diabetes Father      Alcoholism Father      No Known Problems Sister      No Known Problems Sister      No Known Problems Sister      Alcoholism Brother      Multiple myeloma Brother      Pancreatic Cancer Brother      No Known Problems Maternal Grandmother      No Known Problems Maternal Grandfather      No Known Problems Paternal Grandmother      No Known Problems Other      Breast Cancer Maternal Cousin          Physical Exam:     /79 (BP Location: Left arm, Patient Position: Sitting, Cuff Size: Adult Regular)   Pulse 81   Temp 98.2  F (36.8  C) (Oral)   Resp  "20   Ht 1.638 m (5' 4.5\")   Wt 83.9 kg (185 lb)   SpO2 97%   BMI 31.26 kg/m    Body mass index is 31.26 kg/m .    General Appearance: healthy and alert, no distress     HEENT: no thyromegaly, no palpable nodules or masses        Cardiovascular: regular rate and rhythm, no gallops, rubs or murmurs     Respiratory: lungs clear, no rales, rhonchi or wheezes    Musculoskeletal: extremities non tender and without edema    Skin: no lesions or rashes     Neurological: normal gait, no gross defects     Psychiatric: appropriate mood and affect                               Hematological: normal cervical, supraclavicular and inguinal lymph nodes     Gastrointestinal:       abdomen soft, non-tender, non-distended, no organomegaly or masses; laparoscopic incisions intact and well healed    Genitourinary: Deferred      ECOG 0        Assessment:    Dixie Miller is a 61 year old woman with stage II, grade 3 endometrial adenocarcinoma, dMMR type. She is to start study . She is here today for follow up.     25 minutes spent on the date of the encounter doing chart review, history and exam, documentation, and further activities as noted above.          Plan:     1.)       Stage II, grade 3 endometrial cancer, dMMR- pt met with study RN and will meet with radiation oncology tomorrow. Plan to be randomized and follow up with me next week. Study RN will coordinate follow up visits with pt. Clinical trial  which is a RTC comparing standard RT with pembrolizumab/RT (pembrolizumab is given IV every 6 weeks for up to 1 year or 9 cycles).  She will return for a visit with Dr. Good either for her next infusion or following completion of RT pending randomization. Plan per Dr Carson with [facility] who will be planning her RT and plan would be for whole pelvic with brachytherapy given her significant risk factors for local recurrence. Postoperative restrictions reviewed with patient today. Reviewed signs and symptoms for when " she should contact the clinic or seek additional care. Patient to contact the clinic with any questions or concerns in the interim. Pt has follow up scheduled for next week.      2.) Genetic risk factors were assessed and the patient does meet the qualifications for a referral based on family history. Pt is scheduled on 7/28/2022.     3.) Labs and/or tests ordered include:  CBC w diff, CMP, TSH T4 reviewed today.      4.) Health maintenance issues addressed today include annual health maintenance and non-gynecologic issues with PCP.              TRENT Washington, NP-BC  Women's Health Nurse Practitioner  Division of Gynecologic Oncology  [facility]        CC  Patient Care Team:  Brown, Merlin Emery, MD as PCP - General (Internal Medicine)  Masters, Stefanie Cho DO as Assigned OBGYN Provider  Germania Espinal MD as MD (Gynecologic Oncology)  Isabel Tierney, RN as Specialty Care Coordinator (Gynecologic Oncology)  Germania Espinal MD as Assigned Cancer Care Provider  Sarah Simeon, RN as Specialty Care Coordinator (Hematology & Oncology)     Agree with assessment and medication recommendations    Germania Good MD  Gynecologic Oncology  [facility]

## 2022-07-12 ENCOUNTER — LAB (OUTPATIENT)
Dept: INFUSION THERAPY | Facility: CLINIC | Age: 62
End: 2022-07-12
Attending: OBSTETRICS & GYNECOLOGY
Payer: COMMERCIAL

## 2022-07-12 ENCOUNTER — ALLIED HEALTH/NURSE VISIT (OUTPATIENT)
Dept: ONCOLOGY | Facility: CLINIC | Age: 62
End: 2022-07-12

## 2022-07-12 ENCOUNTER — ONCOLOGY VISIT (OUTPATIENT)
Dept: ONCOLOGY | Facility: CLINIC | Age: 62
End: 2022-07-12
Attending: OBSTETRICS & GYNECOLOGY
Payer: COMMERCIAL

## 2022-07-12 VITALS
HEART RATE: 81 BPM | TEMPERATURE: 98.2 F | DIASTOLIC BLOOD PRESSURE: 79 MMHG | SYSTOLIC BLOOD PRESSURE: 134 MMHG | RESPIRATION RATE: 20 BRPM | BODY MASS INDEX: 30.82 KG/M2 | OXYGEN SATURATION: 97 % | HEIGHT: 65 IN | WEIGHT: 185 LBS

## 2022-07-12 VITALS
WEIGHT: 185 LBS | OXYGEN SATURATION: 97 % | SYSTOLIC BLOOD PRESSURE: 134 MMHG | DIASTOLIC BLOOD PRESSURE: 79 MMHG | RESPIRATION RATE: 20 BRPM | TEMPERATURE: 98.2 F | HEIGHT: 65 IN | BODY MASS INDEX: 30.82 KG/M2 | HEART RATE: 81 BPM

## 2022-07-12 DIAGNOSIS — N63.0 BREAST MASS: ICD-10-CM

## 2022-07-12 DIAGNOSIS — C54.1 ENDOMETRIAL CANCER (H): Primary | ICD-10-CM

## 2022-07-12 DIAGNOSIS — C54.1 ENDOMETRIAL CANCER (H): ICD-10-CM

## 2022-07-12 LAB
ALBUMIN SERPL-MCNC: 3.7 G/DL (ref 3.4–5)
ALP SERPL-CCNC: 85 U/L (ref 40–150)
ALT SERPL W P-5'-P-CCNC: 16 U/L (ref 0–50)
ANION GAP SERPL CALCULATED.3IONS-SCNC: 6 MMOL/L (ref 3–14)
AST SERPL W P-5'-P-CCNC: 24 U/L (ref 0–45)
BASOPHILS # BLD AUTO: 0 10E3/UL (ref 0–0.2)
BASOPHILS NFR BLD AUTO: 1 %
BILIRUB SERPL-MCNC: 0.4 MG/DL (ref 0.2–1.3)
BUN SERPL-MCNC: 12 MG/DL (ref 7–30)
CALCIUM SERPL-MCNC: 9.1 MG/DL (ref 8.5–10.1)
CHLORIDE BLD-SCNC: 107 MMOL/L (ref 94–109)
CO2 SERPL-SCNC: 28 MMOL/L (ref 20–32)
CREAT SERPL-MCNC: 0.54 MG/DL (ref 0.52–1.04)
EOSINOPHIL # BLD AUTO: 0.2 10E3/UL (ref 0–0.7)
EOSINOPHIL NFR BLD AUTO: 2 %
ERYTHROCYTE [DISTWIDTH] IN BLOOD BY AUTOMATED COUNT: 12.1 % (ref 10–15)
GFR SERPL CREATININE-BSD FRML MDRD: >90 ML/MIN/1.73M2
GLUCOSE BLD-MCNC: 82 MG/DL (ref 70–99)
HCT VFR BLD AUTO: 40.3 % (ref 35–47)
HGB BLD-MCNC: 13.1 G/DL (ref 11.7–15.7)
IMM GRANULOCYTES # BLD: 0 10E3/UL
IMM GRANULOCYTES NFR BLD: 0 %
LYMPHOCYTES # BLD AUTO: 2.1 10E3/UL (ref 0.8–5.3)
LYMPHOCYTES NFR BLD AUTO: 31 %
MCH RBC QN AUTO: 30 PG (ref 26.5–33)
MCHC RBC AUTO-ENTMCNC: 32.5 G/DL (ref 31.5–36.5)
MCV RBC AUTO: 92 FL (ref 78–100)
MONOCYTES # BLD AUTO: 0.4 10E3/UL (ref 0–1.3)
MONOCYTES NFR BLD AUTO: 6 %
NEUTROPHILS # BLD AUTO: 4.1 10E3/UL (ref 1.6–8.3)
NEUTROPHILS NFR BLD AUTO: 60 %
NRBC # BLD AUTO: 0 10E3/UL
NRBC BLD AUTO-RTO: 0 /100
PLATELET # BLD AUTO: 320 10E3/UL (ref 150–450)
POTASSIUM BLD-SCNC: 3.9 MMOL/L (ref 3.4–5.3)
PROT SERPL-MCNC: 7 G/DL (ref 6.8–8.8)
RBC # BLD AUTO: 4.36 10E6/UL (ref 3.8–5.2)
SODIUM SERPL-SCNC: 141 MMOL/L (ref 133–144)
TSH SERPL DL<=0.005 MIU/L-ACNC: 2.47 MU/L (ref 0.4–4)
WBC # BLD AUTO: 6.9 10E3/UL (ref 4–11)

## 2022-07-12 PROCEDURE — 84443 ASSAY THYROID STIM HORMONE: CPT | Performed by: OBSTETRICS & GYNECOLOGY

## 2022-07-12 PROCEDURE — 99213 OFFICE O/P EST LOW 20 MIN: CPT | Performed by: OBSTETRICS & GYNECOLOGY

## 2022-07-12 PROCEDURE — G0463 HOSPITAL OUTPT CLINIC VISIT: HCPCS

## 2022-07-12 PROCEDURE — 36415 COLL VENOUS BLD VENIPUNCTURE: CPT

## 2022-07-12 PROCEDURE — 85014 HEMATOCRIT: CPT | Performed by: OBSTETRICS & GYNECOLOGY

## 2022-07-12 PROCEDURE — 80053 COMPREHEN METABOLIC PANEL: CPT | Performed by: OBSTETRICS & GYNECOLOGY

## 2022-07-12 ASSESSMENT — PAIN SCALES - GENERAL
PAINLEVEL: NO PAIN (0)
PAINLEVEL: NO PAIN (0)

## 2022-07-12 NOTE — PROGRESS NOTES
"Oncology Rooming Note    July 12, 2022 9:50 AM   Dixie Miller is a 61 year old female who presents for:    Chief Complaint   Patient presents with     Oncology Clinic Visit     Endometrial cancer (H)     Initial Vitals: /79 (BP Location: Left arm, Patient Position: Sitting, Cuff Size: Adult Regular)   Pulse 81   Temp 98.2  F (36.8  C) (Oral)   Resp 20   Ht 1.638 m (5' 4.5\")   Wt 83.9 kg (185 lb)   SpO2 97%   BMI 31.26 kg/m   Estimated body mass index is 31.26 kg/m  as calculated from the following:    Height as of this encounter: 1.638 m (5' 4.5\").    Weight as of this encounter: 83.9 kg (185 lb). Body surface area is 1.95 meters squared.  No Pain (0) Comment: Data Unavailable   No LMP recorded. Patient is postmenopausal.  Allergies reviewed: Yes  Medications reviewed: Yes    Medications: Medication refills not needed today.  Pharmacy name entered into [employer]: [id number] [place] [id number]    Clinical concerns: no    Shanda Allison CMA              "

## 2022-07-12 NOTE — LETTER
2022         RE: Dixie Miller  [address]  [address]  [place] [address]        Dear Colleague,    Thank you for referring your patient, Dixie Miller, to the [facility] [place]. Please see a copy of my visit note below.                    Follow Up Notes on Referred Patient    Date: 2022         RE: Dixie Miller  : 1960  DERICK: 2022          Dixie Miller is a 61 year old woman with stage II, grade 3 endometrial adenocarcinoma, dMMR type.  She is here today for follow up.     Cancer Course:  Patient initially presented with a month history of PMB with cramping.  This was mostly light spotting and she has not had any heavy bleeding. She also has been having a rash which is patchy, itchy and moves to several areas of her body for the past several months.  She had a biopsy which showed non-specific dermatitis.  She has follow up with dermatology for a second opinion later this week.     22:  US pelvis:  Uterus: anteverted. Contour is smooth/regular. Size: 8.21 x 5.20 x 4.44 cm Endometrium: Thickness Total 24.00 mm Findings: Thick Right Ovary: Not seen Left Ovary: 1.87 x 1.61 x .95 cm. Wnl Cul de Sac Free Fluid: No free fluid     22:  EMB:  High grade carcinoma with squamous differentiation     22:  : 24     6/3/22:  CT C/A/P:  1.  1 cm nodule in the right breast centrally. 2.  5 mm on the nodule in the lateral right lower lobe. 3.  Low dense lesion too small to characterize in the liver is probably a cyst. 4.  Markedly thickened endometrium compatible with history. 5.  No adenopathy. 6.  L2 compression deformity.     22:  Total laparoscopic hysterectomy, bilateral salpingo-oophorectomy, bilateral pelvic sentinel lymph node dissection, lysis of adhesions for > 30 minutes, omentectomy, cystoscopy                 Pathology:  Grade 3 endometrial adenocarcinoma with extensive squamous differentiation, tumor size 6.6 cm,   mm myometrial invasion, + JENNIFER, + cervix, +LVSI, 0/11 sentinel LN, negative omentum, Loss of MLH1/PMS2 with + hypermethylation, p53 normal        Obstetrics and Gynecology History:  ,  x 2, c/s x 1  Menopause around age 54, no HRT use              Today Meliza comes to the clinic without concerns. She denies any vaginal bleeding, no changes in her bowel or bladder habits, no nausea/emesis, no lower extremity edema, and no difficulties eating or sleeping. She denies any abdominal discomfort/bloating, no fevers or chills, and no chest pain or shortness of breath. Pt has consultation with radiation oncology tomorrow through [facility] with Dr. Carson. On Lexapro for anxiety which is controlled.                 Review of Systems:    Systemic           no weight changes; no fever; no chills; no night sweats; no appetite changes  Skin           no rashes, or lesions  Eye           no irritation; no changes in vision  Kika-Laryngeal           no dysphagia; no hoarseness   Pulmonary    no cough; no shortness of breath  Cardiovascular    no chest pain; no palpitations  Gastrointestinal    no diarrhea; no constipation; no abdominal pain; no changes in bowel  habits; no blood in stool  Genitourinary   no urinary frequency; no urinary urgency; no dysuria; no pain; no abnormal vaginal discharge; no abnormal vaginal bleeding  Breast    no breast discharge; no breast changes; no breast pain  Musculoskeletal    no myalgias; no arthralgias; no back pain  Psychiatric           no depressed mood; no anxiety    Hematologic           no tender lymph nodes; no noticeable swellings or lumps   Endocrine    no hot flashes; no heat/cold intolerance         Neurological   no tremor; no numbness and tingling; no headaches; no difficulty  sleeping      Past Medical History:    Past Medical History:   Diagnosis Date     Anxiety      Benign essential hypertension     added 2nd med 9/15     Depressive disorder       Dysthymia      Endometrial cancer (H)      High cholesterol      IBS (irritable bowel syndrome)          Past Surgical History:    Past Surgical History:   Procedure Laterality Date      SECTION       LAPAROSCOPIC HYSTERECTOMY TOTAL, BILATERAL SALPINGO-OOPHORECTOMY, COMBINED N/A 2022    Procedure: laparoscopic removal of uterus, cervix, bilateral ovaries and fallopian tubes, bilateral pelvic sentinel lymph node dissection, lysis of adhesions 30 minutes, omentectomy, cystoscopy;  Surgeon: Germania Espinal MD;  [place] [facility] [place]         Health Maintenance Due   Topic Date Due     ADVANCE CARE PLANNING  Never done     DEPRESSION ACTION PLAN  Never done     HIV SCREENING  Never done     ZOSTER IMMUNIZATION (1 of 2) Never done     PHQ-9  2021     COVID-19 Vaccine (4 - Booster for Moderna series) 2022       Current Medications:     Current Outpatient Medications   Medication Sig Dispense Refill     amphetamine-dextroamphetamine (ADDERALL) 5 MG tablet Take by mouth 2 times daily       escitalopram (LEXAPRO) 10 MG tablet TAKE 1 TABLET BY MOUTH EVERY DAY 30 tablet 0     Multiple Vitamins-Minerals (MULTI ADULT GUMMIES PO)            Allergies:      No Known Allergies     Social History:     Social History     Tobacco Use     Smoking status: Never Smoker     Smokeless tobacco: Never Used   Substance Use Topics     Alcohol use: Yes     Alcohol/week: 0.0 standard drinks     Comment: 3 wine a day       History   Drug Use No         Family History:     The patient's family history is notable for     Family History   Problem Relation Age of Onset     Diabetes Type 2  Mother      Diabetes Father      Alcoholism Father      No Known Problems Sister      No Known Problems Sister      No Known Problems Sister      Alcoholism Brother      Multiple myeloma Brother      Pancreatic Cancer Brother      No Known Problems Maternal Grandmother      No Known Problems Maternal Grandfather      No  "Known Problems Paternal Grandmother      No Known Problems Other      Breast Cancer Maternal Cousin          Physical Exam:     /79 (BP Location: Left arm, Patient Position: Sitting, Cuff Size: Adult Regular)   Pulse 81   Temp 98.2  F (36.8  C) (Oral)   Resp 20   Ht 1.638 m (5' 4.5\")   Wt 83.9 kg (185 lb)   SpO2 97%   BMI 31.26 kg/m    Body mass index is 31.26 kg/m .    General Appearance: healthy and alert, no distress     HEENT: no thyromegaly, no palpable nodules or masses        Cardiovascular: regular rate and rhythm, no gallops, rubs or murmurs     Respiratory: lungs clear, no rales, rhonchi or wheezes    Musculoskeletal: extremities non tender and without edema    Skin: no lesions or rashes     Neurological: normal gait, no gross defects     Psychiatric: appropriate mood and affect                               Hematological: normal cervical, supraclavicular and inguinal lymph nodes     Gastrointestinal:       abdomen soft, non-tender, non-distended, no organomegaly or masses; laparoscopic incisions intact and well healed    Genitourinary: Deferred      ECOG 0        Assessment:    Dixie Miller is a 61 year old woman with stage II, grade 3 endometrial adenocarcinoma, dMMR type. She is to start study . She is here today for follow up.     25 minutes spent on the date of the encounter doing chart review, history and exam, documentation, and further activities as noted above.          Plan:     1.)       Stage II, grade 3 endometrial cancer, dMMR- pt met with study RN and will meet with radiation oncology tomorrow. Plan to be randomized and follow up with me next week. Study RN will coordinate follow up visits with pt. Clinical trial  which is a RTC comparing standard RT with pembrolizumab/RT (pembrolizumab is given IV every 6 weeks for up to 1 year or 9 cycles).  She will return for a visit with Dr. Good either for her next infusion or following completion of RT pending " "randomization. Plan per Dr Carson with [facility] who will be planning her RT and plan would be for whole pelvic with brachytherapy given her significant risk factors for local recurrence. Postoperative restrictions reviewed with patient today. Reviewed signs and symptoms for when she should contact the clinic or seek additional care. Patient to contact the clinic with any questions or concerns in the interim. Pt has follow up scheduled for next week.      2.) Genetic risk factors were assessed and the patient does meet the qualifications for a referral based on family history. Pt is scheduled on 7/28/2022.     3.) Labs and/or tests ordered include:  CBC w diff, CMP, TSH T4 reviewed today.      4.) Health maintenance issues addressed today include annual health maintenance and non-gynecologic issues with PCP.              TRENT Washington, NP-BC  Women's Health Nurse Practitioner  Division of Gynecologic Oncology  [facility]        CC  Patient Care Team:  Brown, Merlin Emery, MD as PCP - General (Internal Medicine)  Masters, Stefanie Cho DO as Assigned OBGYN Provider  Germania Espinal MD as MD (Gynecologic Oncology)  Isabel Tierney RN as Specialty Care Coordinator (Gynecologic Oncology)  Germania Espinal MD as Assigned Cancer Care Provider  Sarah Simeon, RN as Specialty Care Coordinator (Hematology & Oncology)      Oncology Rooming Note    July 12, 2022 9:50 AM   Dixie Miller is a 61 year old female who presents for:    Chief Complaint   Patient presents with     Oncology Clinic Visit     Endometrial cancer (H)     Initial Vitals: /79 (BP Location: Left arm, Patient Position: Sitting, Cuff Size: Adult Regular)   Pulse 81   Temp 98.2  F (36.8  C) (Oral)   Resp 20   Ht 1.638 m (5' 4.5\")   Wt 83.9 kg (185 lb)   SpO2 97%   BMI 31.26 kg/m   Estimated body mass index is 31.26 kg/m  as calculated from the following:    Height as of this encounter: " "1.638 m (5' 4.5\").    Weight as of this encounter: 83.9 kg (185 lb). Body surface area is 1.95 meters squared.  No Pain (0) Comment: Data Unavailable   No LMP recorded. Patient is postmenopausal.  Allergies reviewed: Yes  Medications reviewed: Yes    Medications: Medication refills not needed today.  Pharmacy name entered into [employer]: [facility] [id number] [place] [id number]    Clinical concerns: no    Shanda Allison, [facility]                  Again, thank you for allowing me to participate in the care of your patient.        Sincerely,        TRENT Beach CNP    "

## 2022-07-12 NOTE — PROGRESS NOTES
Patient here for screening visit and labs for clinical trial . Patient had an H&P with Dr. Good on 6/24 however it has to be within 14 days from registration. Con-meds and medical history reviewed. No changes from report on 6/24. Labs drawn today meet criteria for eligibility. Princess Bojorquez signed eligibility paperwork.     Patient randomized to the control arm 1 radiation only. Left patient a voice message with the randomization result and future appointment needs for the study. Messaged Dr. Good  with the result. Also updated Dr. Carson and his nurse at radiation as well.     Study visits with labs and provider (eRno or [facility]) are every 6 weeks for the first 3 months then every 12 weeks during year 1.      First CT scan won't be due until 26 weeks from [id number] around mid-January. Scans can be repeated at any other time per MD discretion based on symptoms or signs of recurrance.     Patient called back and verbalized understanding of randomization result and discussed upcoming study appointments and radiation therapy consult appointment. Understands pembrolizumab infusion appointment was canceled.     Debi Sterling, [facility] Research RN at [facility]

## 2022-07-12 NOTE — PROGRESS NOTES
Medical Assistant Note:  Dixie Miller presents today for lab draw.    Patient seen by provider today: No.   present during visit today: Not Applicable.    Concerns: No Concerns.    Procedure:  Lab draw site: LAC, Needle type: BF, Gauge: 21. Gauze and coban applied    Post Assessment:  Labs drawn without difficulty: Yes.    Discharge Plan:  Departure Mode: Ambulatory.    Face to Face Time: 5.    Shanda Allison CMA

## 2022-07-13 ENCOUNTER — TRANSFERRED RECORDS (OUTPATIENT)
Dept: HEALTH INFORMATION MANAGEMENT | Facility: CLINIC | Age: 62
End: 2022-07-13

## 2022-07-18 ENCOUNTER — TELEPHONE (OUTPATIENT)
Dept: ONCOLOGY | Facility: CLINIC | Age: 62
End: 2022-07-18
Payer: COMMERCIAL

## 2022-07-18 DIAGNOSIS — C54.1 ENDOMETRIAL CANCER (H): Primary | ICD-10-CM

## 2022-07-18 NOTE — TELEPHONE ENCOUNTER
Participant on clinical trial . [facility] radiation was not able to complete mapping last week on 7/13 to start xrt for pt this week due to radiation MD illness. Plan is to have mapping appointment tomorrow 7/19 at 08:45am and start radiation on 7/25. Spoke with patient and informed her 7/19 NP [place] appt will be canceled. Patient still needs a research tube drawn prior to starting tx and wants to keep her lab appt on 7/19.     Reviewed need to complete QOL/ePRO on patient's smartphone felipe prior to starting radiation tx.    Patient eligible for MNP patient demographic study since participating in an [employer] study. Reviewed informed consent form over the phone. Patient agreed to participate. Reviewed demographic questionnaire. Will give patient a paper copy of consent tomorrow when she is in clinic.    Debi Sterling, [facility] Research RN at [facility]

## 2022-07-19 ENCOUNTER — ALLIED HEALTH/NURSE VISIT (OUTPATIENT)
Dept: ONCOLOGY | Facility: CLINIC | Age: 62
End: 2022-07-19

## 2022-07-19 ENCOUNTER — LAB (OUTPATIENT)
Dept: INFUSION THERAPY | Facility: CLINIC | Age: 62
End: 2022-07-19
Attending: OBSTETRICS & GYNECOLOGY
Payer: COMMERCIAL

## 2022-07-19 DIAGNOSIS — Z00.6 RESEARCH STUDY PATIENT: Primary | ICD-10-CM

## 2022-07-19 DIAGNOSIS — Z00.6 RESEARCH STUDY PATIENT: ICD-10-CM

## 2022-07-19 PROCEDURE — 36415 COLL VENOUS BLD VENIPUNCTURE: CPT

## 2022-07-19 PROCEDURE — 84999 UNLISTED CHEMISTRY PROCEDURE: CPT | Performed by: OBSTETRICS & GYNECOLOGY

## 2022-07-19 NOTE — PROGRESS NOTES
Patient on  here for research lab draw. Gave patient a copy of the [facility] demographic study consent form. Discussed next appointment in September with Dr. Good. Dr. Good confirmed she does not need any labs at that time. Research only tubes will be drawn. Reviewed study QOL felipe download. Patient understands she needs to complete prior to starting radiation on 7/25 at 08:45am. She was given a paper copy in case she has difficulty with the QOL felipe.     Debi Sterling, [facility] Research RN at [facility]

## 2022-07-21 ENCOUNTER — ALLIED HEALTH/NURSE VISIT (OUTPATIENT)
Dept: ONCOLOGY | Facility: CLINIC | Age: 62
End: 2022-07-21
Payer: COMMERCIAL

## 2022-07-21 DIAGNOSIS — Z00.6 RESEARCH STUDY PATIENT: Primary | ICD-10-CM

## 2022-07-21 NOTE — PROGRESS NOTES
Patient is on study .     She dropped off completed paper QOL's today prior to starting radiation treatment on 7/25. She downloaded the [employer] felipe to complete QOLs electronically and entered the activation code I provided her. However no forms are showing up in the felipe. Emailed [employer] Contact <[email or web address]> requesting guidance.    Passed on message to patient that her requested MD note for work will be addressed next week when a GYN provider is in clinic.    Debi Sterling, [facility] RN at [facility]

## 2022-08-03 ENCOUNTER — VIRTUAL VISIT (OUTPATIENT)
Dept: ONCOLOGY | Facility: CLINIC | Age: 62
End: 2022-08-03
Attending: OBSTETRICS & GYNECOLOGY
Payer: COMMERCIAL

## 2022-08-03 DIAGNOSIS — Z80.51 FAMILY HISTORY OF KIDNEY CANCER: ICD-10-CM

## 2022-08-03 DIAGNOSIS — Z80.41 FAMILY HISTORY OF MALIGNANT NEOPLASM OF OVARY: ICD-10-CM

## 2022-08-03 DIAGNOSIS — Z80.3 FAMILY HISTORY OF MALIGNANT NEOPLASM OF BREAST: ICD-10-CM

## 2022-08-03 DIAGNOSIS — Z80.7 FAMILY HISTORY OF MULTIPLE MYELOMA: ICD-10-CM

## 2022-08-03 DIAGNOSIS — C54.1 ENDOMETRIAL CANCER (H): Primary | ICD-10-CM

## 2022-08-03 DIAGNOSIS — Z80.0 FAMILY HISTORY OF PANCREATIC CANCER: ICD-10-CM

## 2022-08-03 PROCEDURE — 96040 HC GENETIC COUNSELING, EACH 30 MINUTES: CPT | Mod: GT,95 | Performed by: GENETIC COUNSELOR, MS

## 2022-08-03 NOTE — LETTER
8/3/2022         RE: Dixie Miller  [address]  [address]  [facility] [address]      Meliza is a 61 year old who is being evaluated via a billable video visit.        Patient confirms medications and allergies are accurate via patients echeck in completion, and or denies any changes since last reviewed/verified.     Karo Harper, Virtual Facilitator    How would you like to obtain your AVS? MyChart  If the video visit is dropped, the invitation should be resent by: Text to cell phone: [phone]  Will anyone else be joining your video visit? Yes: Carmenza. How would they like to receive their invitation? Text to cell phone: [phone]        Video-Visit Details    Video Start Time: 9:04AM    Type of service:  Video Visit    Video End Time: 10:15AM    Originating Location (pt. Location): Home    Distant Location (provider location):  [facility]     Platform used for Video Visit: [facility]    Cancer Risk Management Program Genetic Counseling Note    8/3/2022    Referring Provider:  Dr. Germania Veliz    Presenting Information:   I had a video visit today with Dixei (Meliza) AFSHAN Miller  for genetic counseling through the Cancer Risk Management Program, in order to discuss her personal history of endometrial cancer and her family history of cancer.  She presents today to review this history, cancer screening recommendations, and available genetic testing options. Meliza's sister and  also participated in today's conversation.    Personal History:  Meliza is a 61 year old female.  Earlier this year, Meliza had some post-menopausal bleeding and cramping, which prompted her to seek out medical attention.  She underwent an ultrasound, which noted a thickened endometrium.  This prompted a uterine biopsy in May; pathology studies of the sampled tissue noted a high-grade carcinoma.  In June, Meliza underwent surgery for cancer treatment/staging, which involved  "removal of her uterus, ovaries, fallopian tubes, and cervix.  Final pathology studies were noted to be consistent with an endometrial adenocarcinoma.  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  Meliza is participating in a clinical trial, which involves radiation therapy treatments.  Meliza states that brachytherapy is being considered for after her initial radiation therapy is completed later this month.    In other medical history, Meliza is noted in her chart to have a diagnosis of depression/anxiety, for which she takes medication. Meliza is reported to have a past history of hypertension, but she does not currently have to take medication for this.  She also reports a past history of irritable bowel symptoms, but she states that she has not had recent issues with this.  With respect to her cancer screening, Meliza's most recent mammogram was in June and was negative; she reports that she had one mammogram several years for which she was called back for additional imaging, but no biopsy was needed. Per her medical records, Meliza had a colonoscopy in 2007; she had a few biopsies done, but this did not detect any colon polyps.  There is also another colonoscopy report in Meliza's records from 2016, which also did not note any polyps; a follow-up colonoscopy was recommended for 10 years from then. Meliza states that she has had some atypical moles removed, some of which have been \"pre-cancerous.\"    Meliza reports no personal history of smoking; she does report a past history of frequent alcohol exposure but that she has significantly reduced this recently. Meliza states that she is not aware of any other personal history of any specific, potentially concerning environmental exposures with respect to cancer risk.    Family History: (Please see scanned pedigree for detailed family history " information)  This information was provided by Meliza and her sister.    As noted above, Meliza was diagnosed with an endometrial cancer at the age of 61.    It is reported that Meliza has a brother who was diagnosed with multiple myeloma at the age 61 and then diagnosed with pancreatic cancer at age 63.  They report that, unfortunately, their brother  of complications of his pancreatic cancer.     It is reported that Meliza has a niece that was diagnosed with ovarian cancer at the age of 37; they report that this cancer was discovered incidentally during a pregnancy and was treated with surgery.    It is reported that Meliza has a maternal first-cousin that was diagnosed with kidney cancer in her 60's, after which she required a kidney transplant.    They also report that they have a maternal first-cousin that was diagnosed with breast cancer.    They report no known family history of cancer on their father's side of the family; however, they report that they have less contact with that side of the family.    Meliza reports that her mother's family is of Scandinavian ancestry and that her father's family is of Nigerian ancestry.  There is no known Ashkenazi Roman Catholic ancestry on either side of her family. There is no reported consanguinity.    Discussion:    We reviewed the features of sporadic, familial, and hereditary cancers. In looking at Meliza's family history, it is possible that a cancer susceptibility gene is present due to the multiple cancers present in Meliza's close relatives, including an early onset cancer in her niece.  We talked about that although these relatives have had different types of cancer, several of these cancers can be linked together in some hereditary cancer syndromes (eg. endometrial, ovarian, pancreatic cancer).  For example, we briefly talked about that these cancers can be seen together in Hebert syndrome.  Hebert syndrome can be caused by a mutation in one of five genes:  MLH1, MSH2,  MSH6, PMS2, and EPCAM.  The highest cancer risks associated with Hebert syndrome include colon cancer, endometrial/uterine cancer, gastric cancer, and ovarian cancer.  Other cancers have also been reported with Hebert syndrome, such as urinary tract, pancreas, bile duct, and other cancers. In addition, pancreatic, breast, and ovarian cancer can be linked together through the Hereditary Breast and Ovarian Cancer (HBOC) syndrome, which is caused by a mutation in the BRCA1 or BRCA2 gene.  HBOC syndrome typically presents with multiple family members diagnosed with breast cancer before age 50 and/or ovarian cancer. Other cancer risks associated with HBOC syndrome include male breast cancer, prostate cancer, pancreatic cancer, and melanoma.       We discussed the natural history and genetics of hereditary cancer syndromes. A detailed handout regarding some of these hereditary cancer syndromes associated with gynecological cancer and the information we discussed was provided to Meliza after our appointment today and can be found in the after visit summary. Topics included: inheritance pattern, cancer risks, cancer screening recommendations, and also risks, benefits and limitations of testing.      Based on her personal and family history, Meliza meets current National Comprehensive Cancer Network (NCCN) criteria for genetic testing of Hebert syndrome (MLH1, MSH2, MSH6, PMS2, and EPCAM), because of her personal history of uterine cancer, her brother's history of pancreatic cancer, and her niece's history of ovarian cancer.  In addition, Mleiza also meets NCCN criteria for genetic testing of BRCA1/BRCA2, because of her niece's diagnosis of ovarian cancer (and also because of her brother's history of pancreatic cancer.  (Meliza is not aware of anyone in the family having previously had hereditary cancer genetic testing.)    We briefly talked about that on Meliza's pathology assessment of her recent tumor, it was noted that her  "tumor cells were missing (ie. had absent IHC staining) for the MLH1 and PMS2 proteins.  We talked about that this can sometimes be due to inherited mutations in the genes that give instructions for making this proteins; as noted above, mutations in these genes are associated with Hebert syndrome.  On the other hand, we talked about that sometimes tumor cells can have absence of these proteins because of changes that happen somatically during the development of that particular tumor (and not because of an inherited gene mutation associated with Hebert syndrome).      We talked about that Meliza's pathology studies of her recently diagnosed endometrial cancer cells showed \"hypermethylation\" of the MLH1 gene, which is the type of cellular change that generally happens during the formation of a tumor and NOT because of an inherited cancer-related gene mutation.  (Methylation of the MLH1 gene turns off the gene, so no MLH1 is made, which in turn, also alters the presence of PMS2, as these two proteins work together.)  However, the presence of MLH1 methylation does not completely rule out the possibility of Hebert syndrome in Meliza, given the family history of other possibly Hebert syndrome related cancers (eg. pancreatic and ovarian cancer).      We discussed that there are additional genes besides those listed above that could cause increased risk for endometrial (and other) cancer. As many of these genes present with overlapping features in a family and accurate cancer risk cannot always be established based upon the pedigree analysis alone, it would be reasonable for Meliza to consider panel genetic testing to analyze multiple genes at once.      Meliza stated that she was interested in pursuing genetic testing through a panel of genes associated with hereditary cancer syndromes, and so we reviewed the various panel options and their potential benefits and limitations.  After this conversation, Meliza decided that she " was interested in a Hebert syndrome and BRCA1/BRCA2 genetic analysis with an automatic reflex to the Common Hereditary Cancers genetic testing panel through [facility].      The Common Hereditary Cancers genetic testing panel through [facility] includes analysis for 47 genes associated with cancers of the breast, ovary, uterus, prostate and gastrointestinal system: APC, EVETTE, AXIN2, BARD1, BMPR1A, BRCA1, BRCA2, BRIP1, CDH1, CDK4, CDKN2A, CHEK2, CTNNA1, DICER1, EPCAM, GREM1, HOXB13, KIT, MEN1, MLH1, MSH2, MSH3, MSH6, MUTYH, NBN, NF1, NTHL1, PALB2, PDGFRA, PMS2, POLD1, POLE, PTEN,RAD50, RAD51C, RAD51D, SDHA, SDHB, SDHC, SDHD, SMAD4, SMARCA4, STK11, TP53,TSC1, TSC2, and VHL.        We discussed that many of these genes are associated with specific hereditary cancer syndromes and published management guidelines: Hereditary Breast and Ovarian Cancer syndrome (BRCA1, BRCA2), Hebert syndrome (MLH1, MSH2, MSH6, PMS2, EPCAM), Familial Adenomatous Polyposis (APC), Hereditary Diffuse Gastric Cancer (CDH1), Familial Atypical Multiple Mole Melanoma syndrome (CDK4, CDKN2A), Multiple Endocrine Neoplasia type 1 (MEN1), Juvenile Polyposis syndrome (BMPR1A, SMAD4), Cowden syndrome (PTEN), Li Fraumeni syndrome (TP53), Hereditary Paraganglioma and Pheochromocytoma syndrome (SDHA, SDHB, SDHC, SDHD), Peutz-Jeghers syndrome (STK11), MUTYH Associated Polyposis (MUTYH), Tuberous sclerosis complex (TSC1, TSC2), Von Hippel-Lindau disease (VHL), and Neurofibromatosis type 1 (NF1). The EVETTE, AXIN2, BRIP1, CHEK2, GREM1, MSH3, NBN, NTHL1, PALB2, POLD1, POLE, RAD51C, and RAD51D genes are associated with increased cancer risk and have published management guidelines for certain cancers. The remaining genes (BARD1, CTNNA1, DICER1, HOXB13, KIT, PDGFRA, RAD50, and SMARCA4) are associated with increased cancer risk and may allow us to make medical recommendations when mutations are identified.       Medical Management: For Meliza, we reviewed that the  information from genetic testing may determine:    additional cancer screening for which Meliza may qualify (eg. mammogram and breast MRI, more frequent colonoscopies, more frequent dermatologic exams, etc.),    options for risk-reducing surgeries Meliza could consider, if indicated (eg. bilateral mastectomy),      and targeted therapies for Meliza recent cancer, or if she were to develop certain cancers in the future (eg. immunotherapy for individuals with Hebert syndrome, PARP inhibitors for HBOC syndrome, etc.).       These recommendations and possible targeted therapies will be discussed in detail once genetic testing is completed.     Plan:  1) Today, Meliza decided to proceed with a Hebert syndrome and BRCA1/BRCA2 genetic analysis with an automatic reflex to the Common Hereditary Cancers genetic testing panel through [facility] [facility]  Therefore, consent was reviewed and verbally obtained.    2) We talked about the options for sample collection.  Meliza plans to have her blood drawn at a local [facility] lab. Test results are expected to be available within 4-6 weeks of that blood draw.  3) Meliza will either have a telephone visit or video visit to discuss the results.  Additional recommendations about screening will be made at that time.      Face to face time via video: 71 minutes        Bushra Panda GC

## 2022-08-03 NOTE — PROGRESS NOTES
Meliza is a 61 year old who is being evaluated via a billable video visit.        Patient confirms medications and allergies are accurate via patients echeck in completion, and or denies any changes since last reviewed/verified.     Karo Harper, Virtual Facilitator    How would you like to obtain your AVS? MyChart  If the video visit is dropped, the invitation should be resent by: Text to cell phone: [phone]  Will anyone else be joining your video visit? Yes: Carmenza. How would they like to receive their invitation? Text to cell phone: [phone]        Video-Visit Details    Video Start Time: 9:04AM    Type of service:  Video Visit    Video End Time: 10:15AM    Originating Location (pt. Location): Home    Distant Location (provider location):  [facility]     Platform used for Video Visit: [employer]

## 2022-08-03 NOTE — LETTER
Cancer Risk Management  Program Locations    [facility]  [facility]  Mailing Address  Cancer Risk Management Program  [facility]  [address]  [place] [address]    New patient appointments  [phone]  August 6, 2022    Dixie Miller  [address]    [facility] [address]      Dear Meliza,    It was a pleasure talking with you via your virtual genetic counseling visit on 8-3-2022.  Below is a copy of the progress note from that recent visit through the Cancer Risk Management Program.  If you have any additional questions, please feel free to contact me.    Cancer Risk Management Program Genetic Counseling Note    8/3/2022    Referring Provider:  Dr. Germania Veliz    Presenting Information:   I had a video visit today with Dixie (Meliza) AFSHAN Miller  for genetic counseling through the Cancer Risk Management Program, in order to discuss her personal history of endometrial cancer and her family history of cancer.  She presents today to review this history, cancer screening recommendations, and available genetic testing options. Meliza's sister and  also participated in today's conversation.    Personal History:  Meliza is a 61 year old female.  Earlier this year, Meliza had some post-menopausal bleeding and cramping, which prompted her to seek out medical attention.  She underwent an ultrasound, which noted a thickened endometrium.  This prompted a uterine biopsy in May; pathology studies of the sampled tissue noted a high-grade carcinoma.  In June, Meliza underwent surgery for cancer treatment/staging, which involved removal of her uterus, ovaries, fallopian tubes, and cervix.  Final pathology studies were noted to be consistent with an endometrial adenocarcinoma.  Meliza's cancer cells  "showed absent staining for the MLH1 and PMS2 mismatch repair proteins; additional follow-up studies were positive for methylation of the MLH1 gene.  Meliza is participating in a clinical trial, which involves radiation therapy treatments.  Meliza states that brachytherapy is being considered for after her initial radiation therapy is completed later this month.    In other medical history, Meliza is noted in her chart to have a diagnosis of depression/anxiety, for which she takes medication. Meliza is reported to have a past history of hypertension, but she does not currently have to take medication for this.  She also reports a past history of irritable bowel symptoms, but she states that she has not had recent issues with this.  With respect to her cancer screening, Meliza's most recent mammogram was in June and was negative; she reports that she had one mammogram several years for which she was called back for additional imaging, but no biopsy was needed. Per her medical records, Meliza had a colonoscopy in 2007; she had a few biopsies done, but this did not detect any colon polyps.  There is also another colonoscopy report in Meliza's records from 2016, which also did not note any polyps; a follow-up colonoscopy was recommended for 10 years from then. Meliza states that she has had some atypical moles removed, some of which have been \"pre-cancerous.\"    Meliza reports no personal history of smoking; she does report a past history of frequent alcohol exposure but that she has significantly reduced this recently. Meliza states that she is not aware of any other personal history of any specific, potentially concerning environmental exposures with respect to cancer risk.    Family History: (Please see scanned pedigree for detailed family history information)  This information was provided by Meliza and her sister.    As noted above, Meliza was diagnosed with an endometrial cancer at the age of 61.    It is reported " that Meliza has a brother who was diagnosed with multiple myeloma at the age 61 and then diagnosed with pancreatic cancer at age 63.  They report that, unfortunately, their brother  of complications of his pancreatic cancer.     It is reported that Meliza has a niece that was diagnosed with ovarian cancer at the age of 37; they report that this cancer was discovered incidentally during a pregnancy and was treated with surgery.    It is reported that Meliza has a maternal first-cousin that was diagnosed with kidney cancer in her 60's, after which she required a kidney transplant.    They also report that they have a maternal first-cousin that was diagnosed with breast cancer.    They report no known family history of cancer on their father's side of the family; however, they report that they have less contact with that side of the family.    Meliza reports that her mother's family is of Scandinavian ancestry and that her father's family is of [place] ancestry.  There is no known Ashkenazi Baptism ancestry on either side of her family. There is no reported consanguinity.    Discussion:    We reviewed the features of sporadic, familial, and hereditary cancers. In looking at Meliza's family history, it is possible that a cancer susceptibility gene is present due to the multiple cancers present in Meliza's close relatives, including an early onset cancer in her niece.  We talked about that although these relatives have had different types of cancer, several of these cancers can be linked together in some hereditary cancer syndromes (eg. endometrial, ovarian, pancreatic cancer).  For example, we briefly talked about that these cancers can be seen together in Hebert syndrome.  Hebert syndrome can be caused by a mutation in one of five genes:  MLH1, MSH2, MSH6, PMS2, and EPCAM.  The highest cancer risks associated with Hebert syndrome include colon cancer, endometrial/uterine cancer, gastric cancer, and ovarian cancer.  Other  cancers have also been reported with Hebert syndrome, such as urinary tract, pancreas, bile duct, and other cancers. In addition, pancreatic, breast, and ovarian cancer can be linked together through the Hereditary Breast and Ovarian Cancer (HBOC) syndrome, which is caused by a mutation in the BRCA1 or BRCA2 gene.  HBOC syndrome typically presents with multiple family members diagnosed with breast cancer before age 50 and/or ovarian cancer. Other cancer risks associated with HBOC syndrome include male breast cancer, prostate cancer, pancreatic cancer, and melanoma.       We discussed the natural history and genetics of hereditary cancer syndromes. A detailed handout regarding some of these hereditary cancer syndromes associated with gynecological cancer and the information we discussed was provided to Meliza after our appointment today and can be found in the after visit summary. Topics included: inheritance pattern, cancer risks, cancer screening recommendations, and also risks, benefits and limitations of testing.      Based on her personal and family history, Meliza meets current National Comprehensive Cancer Network (NCCN) criteria for genetic testing of Hebert syndrome (MLH1, MSH2, MSH6, PMS2, and EPCAM), because of her personal history of uterine cancer, her brother's history of pancreatic cancer, and her niece's history of ovarian cancer.  In addition, Meliza also meets NCCN criteria for genetic testing of BRCA1/BRCA2, because of her niece's diagnosis of ovarian cancer (and also because of her brother's history of pancreatic cancer.  (Meliza is not aware of anyone in the family having previously had hereditary cancer genetic testing.)    We briefly talked about that on Meliza's pathology assessment of her recent tumor, it was noted that her tumor cells were missing (ie. had absent IHC staining) for the MLH1 and PMS2 proteins.  We talked about that this can sometimes be due to inherited mutations in the genes  "that give instructions for making this proteins; as noted above, mutations in these genes are associated with Hebert syndrome.  On the other hand, we talked about that sometimes tumor cells can have absence of these proteins because of changes that happen somatically during the development of that particular tumor (and not because of an inherited gene mutation associated with Hebert syndrome).      We talked about that Meliza's pathology studies of her recently diagnosed endometrial cancer cells showed \"hypermethylation\" of the MLH1 gene, which is the type of cellular change that generally happens during the formation of a tumor and NOT because of an inherited cancer-related gene mutation.  (Methylation of the MLH1 gene turns off the gene, so no MLH1 is made, which in turn, also alters the presence of PMS2, as these two proteins work together.)  However, the presence of MLH1 methylation does not completely rule out the possibility of Hebert syndrome in Meliza, given the family history of other possibly Hebert syndrome related cancers (eg. pancreatic and ovarian cancer).      We discussed that there are additional genes besides those listed above that could cause increased risk for endometrial (and other) cancer. As many of these genes present with overlapping features in a family and accurate cancer risk cannot always be established based upon the pedigree analysis alone, it would be reasonable for Meliza to consider panel genetic testing to analyze multiple genes at once.      Meliza stated that she was interested in pursuing genetic testing through a panel of genes associated with hereditary cancer syndromes, and so we reviewed the various panel options and their potential benefits and limitations.  After this conversation, Meliza decided that she was interested in a Hebert syndrome and BRCA1/BRCA2 genetic analysis with an automatic reflex to the Common Hereditary Cancers genetic testing panel through [facility].      The " Common Hereditary Cancers genetic testing panel through [facility] includes analysis for 47 genes associated with cancers of the breast, ovary, uterus, prostate and gastrointestinal system: APC, EVETTE, AXIN2, BARD1, BMPR1A, BRCA1, BRCA2, BRIP1, CDH1, CDK4, CDKN2A, CHEK2, CTNNA1, DICER1, EPCAM, GREM1, HOXB13, KIT, MEN1, MLH1, MSH2, MSH3, MSH6, MUTYH, NBN, NF1, NTHL1, PALB2, PDGFRA, PMS2, POLD1, POLE, PTEN,RAD50, RAD51C, RAD51D, SDHA, SDHB, SDHC, SDHD, SMAD4, SMARCA4, STK11, TP53,TSC1, TSC2, and VHL.        We discussed that many of these genes are associated with specific hereditary cancer syndromes and published management guidelines: Hereditary Breast and Ovarian Cancer syndrome (BRCA1, BRCA2), Hebert syndrome (MLH1, MSH2, MSH6, PMS2, EPCAM), Familial Adenomatous Polyposis (APC), Hereditary Diffuse Gastric Cancer (CDH1), Familial Atypical Multiple Mole Melanoma syndrome (CDK4, CDKN2A), Multiple Endocrine Neoplasia type 1 (MEN1), Juvenile Polyposis syndrome (BMPR1A, SMAD4), Cowden syndrome (PTEN), Li Fraumeni syndrome (TP53), Hereditary Paraganglioma and Pheochromocytoma syndrome (SDHA, SDHB, SDHC, SDHD), Peutz-Jeghers syndrome (STK11), MUTYH Associated Polyposis (MUTYH), Tuberous sclerosis complex (TSC1, TSC2), Von Hippel-Lindau disease (VHL), and Neurofibromatosis type 1 (NF1). The EVETTE, AXIN2, BRIP1, CHEK2, GREM1, MSH3, NBN, NTHL1, PALB2, POLD1, POLE, RAD51C, and RAD51D genes are associated with increased cancer risk and have published management guidelines for certain cancers. The remaining genes (BARD1, CTNNA1, DICER1, HOXB13, KIT, PDGFRA, RAD50, and SMARCA4) are associated with increased cancer risk and may allow us to make medical recommendations when mutations are identified.       Medical Management: For Meliza, we reviewed that the information from genetic testing may determine:    additional cancer screening for which Meliza may qualify (eg. mammogram and breast MRI, more frequent colonoscopies, more frequent  dermatologic exams, etc.),    options for risk-reducing surgeries Meliza could consider, if indicated (eg. bilateral mastectomy),      and targeted therapies for Meliza recent cancer, or if she were to develop certain cancers in the future (eg. immunotherapy for individuals with Hebert syndrome, PARP inhibitors for HBOC syndrome, etc.).       These recommendations and possible targeted therapies will be discussed in detail once genetic testing is completed.     Plan:  1) Today, Meliza decided to proceed with a Hebert syndrome and BRCA1/BRCA2 genetic analysis with an automatic reflex to the Common Hereditary Cancers genetic testing panel through [facility].  Therefore, consent was reviewed and verbally obtained.    2) We talked about the options for sample collection.  Meliza plans to have her blood drawn at a local [facility] lab. Test results are expected to be available within 4-6 weeks of that blood draw.  3) Meliza will either have a telephone visit or video visit to discuss the results.  Additional recommendations about screening will be made at that time.    Jojo Panda MS, [facility]  Genetic Counselor  Ph: [phone]    Face to face time via video: 71 minutes

## 2022-08-03 NOTE — PATIENT INSTRUCTIONS
Assessing Cancer Risk  Only about 5-10% of cancers are thought to be due to an inherited cancer susceptibility gene.    These families often have:  Several people with the same or related types of cancer  Cancers diagnosed at a young age (before age 50)  Individuals with more than one primary cancer  Multiple generations of the family affected with cancer    Some people may be candidates for genetic testing of more than one gene.  For these families, genetic testing using a cancer panel may be offered.  These panels can test many genes at once known to increase the risk for gynecologic (and other) cancers:  BRCA1, BRCA2, BRIP1 MLH1, MSH2, MSH6, PMS2, EPCAM, PTEN, PALB2, RAD51C, RAD51D, and TP53. The purpose of this handout is to serve as a brief summary of the gynecologic cancer risk genes that have published clinical management guidelines for individuals who are found to carry a mutation.    ______________________________________________________________________________  Hereditary Breast and Ovarian Cancer Syndrome   (BRCA1 and BRCA2)  A single mutation in one of the copies of BRCA1 or BRCA2 increases the risk for breast and ovarian cancer, among others.  The risk for pancreatic cancer and melanoma may also be slightly increased in some families.  The chart below shows the chance that someone with a BRCA mutation would develop cancer in his or her lifetime1,2,3,4.        A person s ethnic background is also important to consider, as individuals of Ashkenazi Evangelical ancestry have a higher chance of having a BRCA gene mutation.  There are three BRCA mutations that occur more frequently in this population.    Hebert Syndrome   (MLH1, MSH2, MSH6, PMS2, and EPCAM)  Currently five genes are known to cause Hebert Syndrome: MLH1, MSH2, MSH6, PMS2, and EPCAM.  A single mutation in one of the Hebert Syndrome genes increases the risk for colon, endometrial, ovarian, and stomach cancers.  Other cancers that occur less  commonly in Hebert Syndrome include urinary tract, skin, and brain cancers.  The chart below shows the chance that a person with Hebert syndrome would develop cancer in his or her lifetime7.      *Cancer risk varies depending on Hebert syndrome gene found    Cowden Syndrome   (PTEN)  Cowden syndrome is a hereditary condition that increases the risk for breast, thyroid, endometrial, and kidney cancer.  Cowden syndrome is caused by a mutation in the PTEN gene.  A single mutation in one of the copies of PTEN causes Cowden syndrome and increases cancer risk.  The chart below shows the chance that someone with a PTEN mutation would develop cancer in their lifetime5,6.  Other benign features seen in some individuals with Cowden syndrome include benign skin lesions (facial papules, keratoses, lipomas), learning disability, autism, thyroid nodules, colon polyps, and larger head size.      *One recent study found breast cancer risk to be increased to 85%  Li-Fraumeni Syndrome   (TP53)  Li-Fraumeni Syndrome (LFS) is a cancer predisposition syndrome caused by a mutation in the TP53 gene. A single mutation in one of the copies of TP53 increases the risk for multiple cancers. Individuals with LFS are at an increased risk for developing cancer at a young age. The general lifetime risk for development of cancer is 50% by age 30 and 90% by age 60.     Core Cancers: Sarcomas, Breast, Brain, Lung, Leukemias/Lymphomas, Adrenocortical carcinomas  Other Cancers: Gastrointestinal, Thyroid, Skin, Genitourinary    Additional Genes  PALB2  Mutations in PALB2 have been shown to increase the risk of breast cancer up to 33-58% in some families; where individuals fall within this risk range is dependent upon family history. PALB2 mutations have also been associated with increased risk for pancreatic cancer, although this risk has not been quantified yet.  Individuals who inherit two PALB2 mutations--one from their mother and one from their  father--have a condition called Fanconi Anemia.  This rare autosomal recessive condition is associated with short stature, developmental delay, bone marrow failure, and increased risk for childhood cancers.    BRIP1, RAD51C and RAD51D  Mutations in BRIP1, RAD51C, and RAD51D have been shown to increase the risk of ovarian cancer as well as female breast cancer.    ______________________________________________________________________________    Inheritance  All of the cancer syndromes reviewed above are inherited in an autosomal dominant pattern.  This means that if a parent has a mutation, each of his or her children will have a 50% chance of inheriting that same mutation.  Therefore, each child--male or female--would have a 50% chance of being at increased risk for developing cancer.      Image obtained from Genetics Home Reference, 2013     Mutations in some genes can occur de stanley, which means that a person s mutation occurred for the first time in them and was not inherited from a parent.  Now that they have the mutation, however, it can be passed on to future generations.    Genetic Testing  Genetic testing involves a blood test and will look for any harmful mutations that are associated with increased cancer risk.  If possible, it is recommended that the person(s) who has had cancer be tested before other family members.  That person will give us the most useful information about whether or not a specific gene is associated with the cancer in the family.    Results  There are three possible results of genetic testing:  Positive--a harmful mutation was identified in one or more of the genes  Negative--no mutation was identified in any of the 9 genes on this panel  Variant of unknown significance--a variation in one of the genes was identified, but it is unclear how this impacts cancer risk in the family    Advantages and Disadvantages   There are advantages and disadvantages to genetic testing.  Advantages  May  clarify your cancer risk  Can help you make medical decisions  May explain the cancers in your family  May give useful information to your family members (if you share your results)    Disadvantages  Possible negative emotional impact of learning about inherited cancer risk  Uncertainty in interpreting a negative test result in some situations  Possible genetic discrimination concerns (see below)    Genetic Information Nondiscrimination Act (JURGEN)  JURGEN is a federal law that protects individuals from health insurance or employment discrimination based on a genetic test result alone.  Although rare, there are currently no legal discrimination protections in terms of life insurance, long term care, or disability insurances.  Visit the [employer] [place] website to learn more.    Reducing Cancer Risk  Each of the genes listed within this handout have nationally recognized cancer screening guidelines that would be recommended for individuals who test positive.  In addition to increased cancer screening, surgeries may be offered or recommended to reduce cancer risk.  Recommendations are based upon an individual s genetic test result as well as their personal and family history of cancer.    Questions to Think About Regarding Genetic Testing:  What effect will the test result have on me and my relationship with my family members if I have an inherited gene mutation?  If I don t have a gene mutation?  Should I share my test results, and how will my family react to this news, which may also affect them?  Are my children ready to learn new information that may one day affect their own health?    Hereditary Cancer Resources    FORCE: Facing Our Risk of Cancer Empowered facingourrisk.org   Bright Pink bebrightpink.org   Li-Fraumeni Syndrome Association lfsassociation.org   PTEN World PTENworld.com   Collaborative Group of the Americas on Inherited Colorectal Cancer (CGA) cgaicc.com  http://www.facingourrisk.org/   Cancer Care cancercare.org   American Cancer Society (ACS) cancer.org   National Cancer [place] (NCI) cancer.gov     Please call us if you have any questions or concerns.   Cancer Risk Management Program [phone] ([phone])  Phong Jones, MS [facility]  [phone]  Thais Martinez, MS, [facility] [phone]  ANNIKA    Jojo Panda, MS, [facility]  [phone]    [email or web address]  Vera Rodriguez, MS, [facility]  [phone]  Meri Davie, MS, [facility]  [phone]  Yari Manuelel, MS, [facility] [phone]  Stefanie Neyda, MS, [facility] [phone]    References  Isabel FLANAGAN, June PDP, Elsa S, Miriam PELAEZ, Fritz JE, Priyanka JL, Luis Fernando N, Kassy H, Niecy O, Gracie A, Rhonda B, Elisa P, Fay S, Suzan DM, Fierro N, Milana E, Mino H, Tolbert E, Lubinski J, Gronwald J, Juan B, Tiffanie H, Thorlacius S, Eerola H, Dyllan H, Rosemarie K, Edgar OP. Average risks of breast and ovarian cancer associated with BRCA1 or BRCA2 mutations detected in case series unselected for family history: a combined analysis of 222 studies. Am J Hum Danyelle. 2003;72:1117-30.  Alfredito N, Heather M, Misty G.  BRCA1 and BRCA2 Hereditary Breast and Ovarian Cancer. Gene Reviews online. 2013.  Keny YC, Lora S, Kayli G, Rowan S. Breast cancer risk among male BRCA1 and BRCA2 mutation carriers. J Natl Cancer Inst. 2007;99:1811-4.  Leroy RAGSDALE, Saravanan I, Chad IZQUIERDO, Sara E, José ER, Sandra F. Risk of breast cancer in male BRCA2 carriers. J Med Danyelle. 2010;47:710-1.  Aiden GOMEZ, Richie IZQUIERDO, Janae IZQUIERDO, Radha OLIVA, Orloff MS, Eng C. Lifetime cancer risks in individuals with germline PTEN mutations. Clin Cancer Res. 2012;18:400-7.  Con PINEDA. Cowden Syndrome: A Critical Review of the Clinical Literature. J Danyelle . 2009:18:13-27.  National [facility] Cancer Network. Clinical practice guidelines in oncology, colorectal cancer screening. Available online (registration required). 2015.  Isabel WALLACE, et al.  Breast-Cancer Risk in Families with Mutations in PALB2. NEJM. 2014; 371(6):497-506.

## 2022-08-03 NOTE — PROGRESS NOTES
Cancer Risk Management Program Genetic Counseling Note    8/3/2022    Referring Provider:  Dr. Germania Veliz    Presenting Information:   I had a video visit today with Dixie Ricardo) AFSHAN Miller  for genetic counseling through the Cancer Risk Management Program, in order to discuss her personal history of endometrial cancer and her family history of cancer.  She presents today to review this history, cancer screening recommendations, and available genetic testing options. Meliza's sister and  also participated in today's conversation.    Personal History:  Meliza is a 61 year old female.  Earlier this year, Meliza had some post-menopausal bleeding and cramping, which prompted her to seek out medical attention.  She underwent an ultrasound, which noted a thickened endometrium.  This prompted a uterine biopsy in May; pathology studies of the sampled tissue noted a high-grade carcinoma.  In June, Meliza underwent surgery for cancer treatment/staging, which involved removal of her uterus, ovaries, fallopian tubes, and cervix.  Final pathology studies were noted to be consistent with an endometrial adenocarcinoma.  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  Meliza is participating in a clinical trial, which involves radiation therapy treatments.  Meliza states that brachytherapy is being considered for after her initial radiation therapy is completed later this month.    In other medical history, Meliza is noted in her chart to have a diagnosis of depression/anxiety, for which she takes medication. Meliza is reported to have a past history of hypertension, but she does not currently have to take medication for this.  She also reports a past history of irritable bowel symptoms, but she states that she has not had recent issues with this.  With respect to her cancer screening,  "Meliza's most recent mammogram was in  and was negative; she reports that she had one mammogram several years for which she was called back for additional imaging, but no biopsy was needed. Per her medical records, Meliza had a colonoscopy in ; she had a few biopsies done, but this did not detect any colon polyps.  There is also another colonoscopy report in Meliza's records from , which also did not note any polyps; a follow-up colonoscopy was recommended for 10 years from then. Meliza states that she has had some atypical moles removed, some of which have been \"pre-cancerous.\"    Meliza reports no personal history of smoking; she does report a past history of frequent alcohol exposure but that she has significantly reduced this recently. Meliza states that she is not aware of any other personal history of any specific, potentially concerning environmental exposures with respect to cancer risk.    Family History: (Please see scanned pedigree for detailed family history information)  This information was provided by Meliza and her sister.    As noted above, Meliza was diagnosed with an endometrial cancer at the age of 61.    It is reported that Meliza has a brother who was diagnosed with multiple myeloma at the age 61 and then diagnosed with pancreatic cancer at age 63.  They report that, unfortunately, their brother  of complications of his pancreatic cancer.     It is reported that Meliza has a niece that was diagnosed with ovarian cancer at the age of 37; they report that this cancer was discovered incidentally during a pregnancy and was treated with surgery.    It is reported that Meliza has a maternal first-cousin that was diagnosed with kidney cancer in her 60's, after which she required a kidney transplant.    They also report that they have a maternal first-cousin that was diagnosed with breast cancer.    They report no known family history of cancer on their father's side of the family; " however, they report that they have less contact with that side of the family.    Meliza reports that her mother's family is of Scandinavian ancestry and that her father's family is of [place] ancestry.  There is no known Ashkenazi Roman Catholic ancestry on either side of her family. There is no reported consanguinity.    Discussion:    We reviewed the features of sporadic, familial, and hereditary cancers. In looking at Meliza's family history, it is possible that a cancer susceptibility gene is present due to the multiple cancers present in Meliza's close relatives, including an early onset cancer in her niece.  We talked about that although these relatives have had different types of cancer, several of these cancers can be linked together in some hereditary cancer syndromes (eg. endometrial, ovarian, pancreatic cancer).  For example, we briefly talked about that these cancers can be seen together in Hebert syndrome.  Hebert syndrome can be caused by a mutation in one of five genes:  MLH1, MSH2, MSH6, PMS2, and EPCAM.  The highest cancer risks associated with Hebert syndrome include colon cancer, endometrial/uterine cancer, gastric cancer, and ovarian cancer.  Other cancers have also been reported with Hebert syndrome, such as urinary tract, pancreas, bile duct, and other cancers. In addition, pancreatic, breast, and ovarian cancer can be linked together through the Hereditary Breast and Ovarian Cancer (HBOC) syndrome, which is caused by a mutation in the BRCA1 or BRCA2 gene.  HBOC syndrome typically presents with multiple family members diagnosed with breast cancer before age 50 and/or ovarian cancer. Other cancer risks associated with HBOC syndrome include male breast cancer, prostate cancer, pancreatic cancer, and melanoma.       We discussed the natural history and genetics of hereditary cancer syndromes. A detailed handout regarding some of these hereditary cancer syndromes associated with gynecological cancer and the  "information we discussed was provided to Meliza after our appointment today and can be found in the after visit summary. Topics included: inheritance pattern, cancer risks, cancer screening recommendations, and also risks, benefits and limitations of testing.      Based on her personal and family history, Meliza meets current National Comprehensive Cancer Network (NCCN) criteria for genetic testing of Hebert syndrome (MLH1, MSH2, MSH6, PMS2, and EPCAM), because of her personal history of uterine cancer, her brother's history of pancreatic cancer, and her niece's history of ovarian cancer.  In addition, Meliza also meets NCCN criteria for genetic testing of BRCA1/BRCA2, because of her niece's diagnosis of ovarian cancer (and also because of her brother's history of pancreatic cancer.  (Meliza is not aware of anyone in the family having previously had hereditary cancer genetic testing.)    We briefly talked about that on Meliza's pathology assessment of her recent tumor, it was noted that her tumor cells were missing (ie. had absent IHC staining) for the MLH1 and PMS2 proteins.  We talked about that this can sometimes be due to inherited mutations in the genes that give instructions for making this proteins; as noted above, mutations in these genes are associated with Hebert syndrome.  On the other hand, we talked about that sometimes tumor cells can have absence of these proteins because of changes that happen somatically during the development of that particular tumor (and not because of an inherited gene mutation associated with Hebert syndrome).      We talked about that Meliza's pathology studies of her recently diagnosed endometrial cancer cells showed \"hypermethylation\" of the MLH1 gene, which is the type of cellular change that generally happens during the formation of a tumor and NOT because of an inherited cancer-related gene mutation.  (Methylation of the MLH1 gene turns off the gene, so no MLH1 is made, which " in turn, also alters the presence of PMS2, as these two proteins work together.)  However, the presence of MLH1 methylation does not completely rule out the possibility of Hebert syndrome in Meliza, given the family history of other possibly Hebert syndrome related cancers (eg. pancreatic and ovarian cancer).      We discussed that there are additional genes besides those listed above that could cause increased risk for endometrial (and other) cancer. As many of these genes present with overlapping features in a family and accurate cancer risk cannot always be established based upon the pedigree analysis alone, it would be reasonable for Meliza to consider panel genetic testing to analyze multiple genes at once.      Meliza stated that she was interested in pursuing genetic testing through a panel of genes associated with hereditary cancer syndromes, and so we reviewed the various panel options and their potential benefits and limitations.  After this conversation, Meliza decided that she was interested in a Hebert syndrome and BRCA1/BRCA2 genetic analysis with an automatic reflex to the Common Hereditary Cancers genetic testing panel through [facility].      The Common Hereditary Cancers genetic testing panel through [facility] includes analysis for 47 genes associated with cancers of the breast, ovary, uterus, prostate and gastrointestinal system: APC, EVETTE, AXIN2, BARD1, BMPR1A, BRCA1, BRCA2, BRIP1, CDH1, CDK4, CDKN2A, CHEK2, CTNNA1, DICER1, EPCAM, GREM1, HOXB13, KIT, MEN1, MLH1, MSH2, MSH3, MSH6, MUTYH, NBN, NF1, NTHL1, PALB2, PDGFRA, PMS2, POLD1, POLE, PTEN,RAD50, RAD51C, RAD51D, SDHA, SDHB, SDHC, SDHD, SMAD4, SMARCA4, STK11, TP53,TSC1, TSC2, and VHL.        We discussed that many of these genes are associated with specific hereditary cancer syndromes and published management guidelines: Hereditary Breast and Ovarian Cancer syndrome (BRCA1, BRCA2), Hebert syndrome (MLH1, MSH2, MSH6, PMS2, EPCAM), Familial Adenomatous  Polyposis (APC), Hereditary Diffuse Gastric Cancer (CDH1), Familial Atypical Multiple Mole Melanoma syndrome (CDK4, CDKN2A), Multiple Endocrine Neoplasia type 1 (MEN1), Juvenile Polyposis syndrome (BMPR1A, SMAD4), Cowden syndrome (PTEN), Li Fraumeni syndrome (TP53), Hereditary Paraganglioma and Pheochromocytoma syndrome (SDHA, SDHB, SDHC, SDHD), Peutz-Jeghers syndrome (STK11), MUTYH Associated Polyposis (MUTYH), Tuberous sclerosis complex (TSC1, TSC2), Von Hippel-Lindau disease (VHL), and Neurofibromatosis type 1 (NF1). The EVETTE, AXIN2, BRIP1, CHEK2, GREM1, MSH3, NBN, NTHL1, PALB2, POLD1, POLE, RAD51C, and RAD51D genes are associated with increased cancer risk and have published management guidelines for certain cancers. The remaining genes (BARD1, CTNNA1, DICER1, HOXB13, KIT, PDGFRA, RAD50, and SMARCA4) are associated with increased cancer risk and may allow us to make medical recommendations when mutations are identified.       Medical Management: For Meliza, we reviewed that the information from genetic testing may determine:    additional cancer screening for which Meliza may qualify (eg. mammogram and breast MRI, more frequent colonoscopies, more frequent dermatologic exams, etc.),    options for risk-reducing surgeries Meliza could consider, if indicated (eg. bilateral mastectomy),      and targeted therapies for Meliza recent cancer, or if she were to develop certain cancers in the future (eg. immunotherapy for individuals with Hebert syndrome, PARP inhibitors for HBOC syndrome, etc.).       These recommendations and possible targeted therapies will be discussed in detail once genetic testing is completed.     Plan:  1) Today, Meliza decided to proceed with a Hebert syndrome and BRCA1/BRCA2 genetic analysis with an automatic reflex to the Common Hereditary Cancers genetic testing panel through [facility].  Therefore, consent was reviewed and verbally obtained.    2) We talked about the options for sample  collection.  Meliza plans to have her blood drawn at a local [facility]. Test results are expected to be available within 4-6 weeks of that blood draw.  3) Meliza will either have a telephone visit or video visit to discuss the results.  Additional recommendations about screening will be made at that time.    Jojo Panda MS, [facility]  Genetic Counselor  Ph: [phone]    Face to face time via video: 71 minutes

## 2022-08-03 NOTE — Clinical Note
8/3/2022         RE: Dixie Miller  [address]  [address]  [place] [address]        Dear Colleague,    Thank you for referring your patient, Dixie Miller, to the [facility]. Please see a copy of my visit note below.    Meliza is a 61 year old who is being evaluated via a billable video visit.        Patient confirms medications and allergies are accurate via patients echeck in completion, and or denies any changes since last reviewed/verified.     Karo Harper, Virtual Facilitator    How would you like to obtain your AVS? MyChart  If the video visit is dropped, the invitation should be resent by: Text to cell phone: [phone]  Will anyone else be joining your video visit? Yes: Carmenza. How would they like to receive their invitation? Text to cell phone: [phone]  {If patient encounters technical issues they should call [phone] :[phone]}      Video-Visit Details    Video Start Time: 9:04AM    Type of service:  Video Visit    Video End Time: 10:15AM    Originating Location (pt. Location): Home    Distant Location (provider location):  [facility]     Platform used for Video Visit: [employer]    8/3/2022    Referring Provider: ***    Presenting Information:   I met with Dixie Miller today for genetic counseling at the Cancer Risk Management Program at the *** to discuss her personal and family history of *** cancer.  She is here today to review this history, cancer screening recommendations, and available genetic testing options.    Personal History:  Dixie is a 61 year old female. She was diagnosed with ***; treatment included ***  / does not have any personal history of cancer.    ***She had her first menstrual period at age ***, her first child at age ***, and is ***.  ***Dixie has her ovaries, fallopian tubes and uterus in place, and she has had *** ovarian cancer screening to date. She reports that she ***  hormone replacement therapy.      She has *** clinical breast exams and mammograms; her most recent mammogram in *** was ***. *** Dixie began having colonoscopies at the age of ***/Dixie has not had a colonoscopy. Her most recent colonoscopy in *** was *** and follow-up was recommended in ***. *** She does not regularly do any other cancer screening at this time. Dixie reported *** tobacco use and *** alcohol use.    Family History: (Please see scanned pedigree for detailed family history information)    ***    ***    Her maternal ethnicity is ***. Her paternal ethnicity is ***.  There is no known Ashkenazi Restorationism ancestry on either side of her family. There is no reported consanguinity.    Discussion:    Dixie's personal and family history of *** is suggestive of a hereditary cancer syndrome.    We reviewed the features of sporadic, familial, and hereditary cancers. In looking at Dixie's family history, it is possible that a cancer susceptibility gene is present due to ***.    We discussed the natural history and genetics of ***. A detailed handout regarding *** and the information we discussed was provided to Dixie at the end of our appointment today and can be found in the after visit summary. Topics included: inheritance pattern, cancer risks, cancer screening recommendations, and also risks, benefits and limitations of testing.    ***    Based on her personal and family history, Dixie meets current National Comprehensive Cancer Network (NCCN) criteria for genetic testing of ***.      We discussed that there are additional genes that could cause increased risk for *** cancer. As many of these genes present with overlapping features in a family and accurate cancer risk cannot always be established based upon the pedigree analysis alone, it would be reasonable for Dixie to consider panel genetic testing to analyze multiple genes at once.    ***    Genetic testing is available for:  ***.      Medical Management: For Dixie, we reviewed that the information from genetic testing may determine:    ***surgery to treat Dixie's active cancer diagnosis (i.e. lumpectomy versus bilateral mastectomy, partial versus total colectomy, etc.***),    additional cancer screening for which Dixie may qualify (i.e. mammogram and breast MRI, more frequent colonoscopies, more frequent dermatologic exams, etc.***),    options for risk reducing surgeries Dixie could consider (i.e. bilateral mastectomy, surgery to remove her ovaries and/or uterus, etc.***),      and targeted chemotherapies for Dixie's *** active cancer, or ***if she were to develop certain cancers in the future (i.e. immunotherapy for individuals with Hebert syndrome, PARP inhibitors, etc.***).     These recommendations and possible targeted chemotherapies will be discussed in detail once genetic testing is completed.     Plan:  1) Today Dixie elected to proceed with ***.  2) This information should be available in 4-6*** weeks.  3) Dixie will return to clinic to discuss the results.    Face to face time: *** minutes    ***      Again, thank you for allowing me to participate in the care of your patient.        Sincerely,        Bushra Panda GC

## 2022-08-31 ENCOUNTER — TRANSFERRED RECORDS (OUTPATIENT)
Dept: ONCOLOGY | Facility: CLINIC | Age: 62
End: 2022-08-31

## 2022-09-08 ENCOUNTER — TELEPHONE (OUTPATIENT)
Dept: ONCOLOGY | Facility: CLINIC | Age: 62
End: 2022-09-08
Payer: COMMERCIAL

## 2022-09-08 DIAGNOSIS — Z00.6 RESEARCH STUDY PATIENT: Primary | ICD-10-CM

## 2022-09-08 NOTE — TELEPHONE ENCOUNTER
Called patient on clinical trial -control arm ahead of tomorrow's visit to check in. She reported she is doing well. Asked patient to attempt to complete QOL form on her phone felipe prior to seeing Dr. Good. If it does not work a paper copy will be available-given to MA. Research tubes given to LEO Crawley-will place on research RN's desk.  Messaged MD and CCRN with next visit needs in January.   CT due 1/3/23-1/13/23   Dr. Good visit 1/13/23   No labs needed per research.   Debi Sterling, [facility] Research RN at [facility]

## 2022-09-09 ENCOUNTER — ONCOLOGY VISIT (OUTPATIENT)
Dept: ONCOLOGY | Facility: CLINIC | Age: 62
End: 2022-09-09
Attending: OBSTETRICS & GYNECOLOGY
Payer: COMMERCIAL

## 2022-09-09 ENCOUNTER — LAB (OUTPATIENT)
Dept: INFUSION THERAPY | Facility: CLINIC | Age: 62
End: 2022-09-09
Attending: OBSTETRICS & GYNECOLOGY
Payer: COMMERCIAL

## 2022-09-09 VITALS
WEIGHT: 181 LBS | OXYGEN SATURATION: 98 % | TEMPERATURE: 98.7 F | HEART RATE: 76 BPM | DIASTOLIC BLOOD PRESSURE: 77 MMHG | SYSTOLIC BLOOD PRESSURE: 125 MMHG | BODY MASS INDEX: 30.59 KG/M2

## 2022-09-09 DIAGNOSIS — Z00.6 RESEARCH STUDY PATIENT: ICD-10-CM

## 2022-09-09 DIAGNOSIS — Z80.7 FAMILY HISTORY OF MULTIPLE MYELOMA: ICD-10-CM

## 2022-09-09 DIAGNOSIS — Z80.3 FAMILY HISTORY OF MALIGNANT NEOPLASM OF BREAST: ICD-10-CM

## 2022-09-09 DIAGNOSIS — Z80.41 FAMILY HISTORY OF MALIGNANT NEOPLASM OF OVARY: ICD-10-CM

## 2022-09-09 DIAGNOSIS — Z80.51 FAMILY HISTORY OF KIDNEY CANCER: ICD-10-CM

## 2022-09-09 DIAGNOSIS — C54.1 ENDOMETRIAL CANCER (H): Primary | ICD-10-CM

## 2022-09-09 DIAGNOSIS — Z80.0 FAMILY HISTORY OF PANCREATIC CANCER: ICD-10-CM

## 2022-09-09 PROCEDURE — 99214 OFFICE O/P EST MOD 30 MIN: CPT | Performed by: OBSTETRICS & GYNECOLOGY

## 2022-09-09 PROCEDURE — 36415 COLL VENOUS BLD VENIPUNCTURE: CPT | Performed by: OBSTETRICS & GYNECOLOGY

## 2022-09-09 PROCEDURE — G0463 HOSPITAL OUTPT CLINIC VISIT: HCPCS | Mod: 25

## 2022-09-09 ASSESSMENT — PAIN SCALES - GENERAL: PAINLEVEL: NO PAIN (0)

## 2022-09-09 NOTE — LETTER
2022         RE: Dixie Miller  [address]  [address]  [facility] [address]        Dear Colleague,    Thank you for referring your patient, Dixie Miller, to the [facility]. Please see a copy of my visit note below.    Consult Notes on Referred Patient        Dr. Stefanie Cho Masters, DO  [address]  [place] [address]       RE: Dixie Miller  : 1960  DERICK: Sep 9, 2022    HPI:  Dixie Miller is 61 year old with stage II, grade 3 endometrial adenocarcinoma, dMMR type.  She is accompanied today by her . She is very excited as her son and his wife had twins today at 29 weeks and they are doing well in the NICU.  She has completed her RT and is doing well and recovering.  She notes continued fatigue but otherwise is relatively asymptomatic.    Cancer Course:  Patient initially presented with a month history of PMB with cramping.  This was mostly light spotting and she has not had any heavy bleeding. She also has been having a rash which is patchy, itchy and moves to several areas of her body for the past several months.  She had a biopsy which showed non-specific dermatitis.  She has follow up with dermatology for a second opinion later this week.    22:  US pelvis:  Uterus: anteverted. Contour is smooth/regular. Size: 8.21 x 5.20 x 4.44 cm Endometrium: Thickness Total 24.00 mm Findings: Thick Right Ovary: Not seen Left Ovary: 1.87 x 1.61 x .95 cm. Wnl Cul de Sac Free Fluid: No free fluid    22:  EMB:  High grade carcinoma with squamous differentiation    22:  : 24    6/3/22:  CT C/A/P:  1.  1 cm nodule in the right breast centrally. 2.  5 mm on the nodule in the lateral right lower lobe. 3.  Low dense lesion too small to characterize in the liver is probably a cyst. 4.  Markedly thickened endometrium compatible with history. 5.  No adenopathy. 6.  L2 compression deformity.    22:  Total laparoscopic  hysterectomy, bilateral salpingo-oophorectomy, bilateral pelvic sentinel lymph node dissection, lysis of adhesions for > 30 minutes, omentectomy, cystoscopy   Pathology:  Grade 3 endometrial adenocarcinoma with extensive squamous differentiation, tumor size 6.6 cm, 9/13 mm myometrial invasion, + JENNIFER, + cervix, +LVSI, 0/11 sentinel LN, negative omentum, Loss of MLH1/PMS2 with + hypermethylation, p53 normal    22-22:  Completed XRT per  study protocol (Control arm)      Obstetrics and Gynecology History:  ,  x 2, c/s x 1  Menopause around age 54, no HRT use      Past Medical History:  Past Medical History:   Diagnosis Date     Anxiety      Benign essential hypertension     added 2nd med 9/15     Depressive disorder      Dysthymia      Endometrial cancer (H)      High cholesterol      IBS (irritable bowel syndrome)        Past Surgical History:  Past Surgical History:   Procedure Laterality Date      SECTION       LAPAROSCOPIC HYSTERECTOMY TOTAL, BILATERAL SALPINGO-OOPHORECTOMY, COMBINED N/A 2022    Procedure: laparoscopic removal of uterus, cervix, bilateral ovaries and fallopian tubes, bilateral pelvic sentinel lymph node dissection, lysis of adhesions 30 minutes, omentectomy, cystoscopy;  Surgeon: Germania Espinal MD;  Location: [facility] OR       Health Maintenance:  Last Pap Smear: No need for further pap smear exams s/p hysterectomy  She has not had a history of abnormal Pap smears.    Last Mammogram: 22              Result: normal      She has not had a history of abnormal mammograms.    Last Colonoscopy: 10/28/16              Result: Negative, repeat 10 years       Social History:  Lives with her , feels safe at home.  Works in retail part time.  Enjoys swimming, walking, spending time with friends, book clubs.  Does have an advanced directive on file and would like her son, Steve and her , Endy to be her POA.  Would like full resuscitation  if reversible cause is identified, however would not like to be kept on life sustaining measures long-term.     Family History:   The patient's family history is significant for.  Family History   Problem Relation Age of Onset     Diabetes Type 2  Mother      Diabetes Father      Alcoholism Father      No Known Problems Sister      No Known Problems Sister      No Known Problems Sister      Alcoholism Brother      Multiple myeloma Brother      Pancreatic Cancer Brother      No Known Problems Maternal Grandmother      No Known Problems Maternal Grandfather      No Known Problems Paternal Grandmother      No Known Problems Other      Breast Cancer Maternal Cousin          Physical Exam:   /77 (BP Location: Right arm, Patient Position: Sitting, Cuff Size: Adult Regular)   Pulse 76   Temp 98.7  F (37.1  C) (Oral)   Wt 82.1 kg (181 lb)   SpO2 98%   BMI 30.59 kg/m    Body mass index is 30.59 kg/m .    General Appearance: healthy and alert, no distress    Gastrointestinal:       abdomen soft, non-tender, non-distended, no organomegaly or masses    Genitourinary: External genitalia and urethral meatus appears normal.  Vagina is smooth with a normal appearing vaginal cuff and no signs of radiation changes at this time    Labs:  None       ECOG 0    Assessment:  Dixie Miller is a 61 year old woman with stage II, grade 3 endometrial adenocarcinoma     A total of 30 minutes was spent on patient care today.       Plan:     1.)    Stage II, grade 3 endometrial cancer, dMMR-She is doing well post-RT. Discussed signs and symptoms of recurrence and to call if these should occur.  Discussed role of surveillance with history and physical exam.  Imaging will be obtained per study protocol and is due next 1/23.  Discussed no need for further pap smear testing.  Discussed visits every 3 months for the first two years then every 6 months for up to 5 years then annually thereafter. She will return in 3 months.       2.) Genetic risk factors were assessed and the patient has undergone genetic testing which is pending    3.) Labs and/or tests ordered include:  CT C/A/P     4.) Health maintenance issues addressed today include pt is up to date.          Thank you for allowing us to participate in the care of your patient.         Sincerely,    Germania Good MD  Gynecologic Oncology  [facility]       CC  MASTERS, EREN THURMAN        Again, thank you for allowing me to participate in the care of your patient.        Sincerely,        Jose Alfredo Good MD

## 2022-09-09 NOTE — PROGRESS NOTES
Medical Assistant Note:  Dixie Miller presents today for lab draw.    Patient seen by provider today: Yes: Dr Good.   present during visit today: Not Applicable.    Concerns: No Concerns.    Procedure:  Lab draw site: LAC, Needle type: BF, Gauge: 21. Gauze and coban applied    Post Assessment:  Labs drawn without difficulty: Yes.    Discharge Plan:  Departure Mode: Ambulatory.    Face to Face Time: 5.    Shanda Allison CMA

## 2022-09-09 NOTE — PROGRESS NOTES
Consult Notes on Referred Patient        Dr. Stefanie Cho Masters, DO  [address] [facility] [address]  [place] [address]       RE: Dixie Miller  : 1960  DERICK: Sep 9, 2022    HPI:  Dixie Miller is 61 year old with stage II, grade 3 endometrial adenocarcinoma, dMMR type.  She is accompanied today by her . She is very excited as her son and his wife had twins today at 29 weeks and they are doing well in the NICU.  She has completed her RT and is doing well and recovering.  She notes continued fatigue but otherwise is relatively asymptomatic.    Cancer Course:  Patient initially presented with a month history of PMB with cramping.  This was mostly light spotting and she has not had any heavy bleeding. She also has been having a rash which is patchy, itchy and moves to several areas of her body for the past several months.  She had a biopsy which showed non-specific dermatitis.  She has follow up with dermatology for a second opinion later this week.    22:  US pelvis:  Uterus: anteverted. Contour is smooth/regular. Size: 8.21 x 5.20 x 4.44 cm Endometrium: Thickness Total 24.00 mm Findings: Thick Right Ovary: Not seen Left Ovary: 1.87 x 1.61 x .95 cm. Wnl Cul de Sac Free Fluid: No free fluid    22:  EMB:  High grade carcinoma with squamous differentiation    22:  : 24    6/3/22:  CT C/A/P:  1.  1 cm nodule in the right breast centrally. 2.  5 mm on the nodule in the lateral right lower lobe. 3.  Low dense lesion too small to characterize in the liver is probably a cyst. 4.  Markedly thickened endometrium compatible with history. 5.  No adenopathy. 6.  L2 compression deformity.    22:  Total laparoscopic hysterectomy, bilateral salpingo-oophorectomy, bilateral pelvic sentinel lymph node dissection, lysis of adhesions for > 30 minutes, omentectomy, cystoscopy   Pathology:  Grade 3 endometrial adenocarcinoma with extensive squamous differentiation, tumor size 6.6 cm, 9/13 mm  myometrial invasion, + JENNIFER, + cervix, +LVSI, 0/11 sentinel LN, negative omentum, Loss of MLH1/PMS2 with + hypermethylation, p53 normal    22-22:  Completed XRT per  study protocol (Control arm)      Obstetrics and Gynecology History:  ,  x 2, c/s x 1  Menopause around age 54, no HRT use      Past Medical History:  Past Medical History:   Diagnosis Date     Anxiety      Benign essential hypertension     added 2nd med 9/15     Depressive disorder      Dysthymia      Endometrial cancer (H)      High cholesterol      IBS (irritable bowel syndrome)        Past Surgical History:  Past Surgical History:   Procedure Laterality Date      SECTION       LAPAROSCOPIC HYSTERECTOMY TOTAL, BILATERAL SALPINGO-OOPHORECTOMY, COMBINED N/A 2022    Procedure: laparoscopic removal of uterus, cervix, bilateral ovaries and fallopian tubes, bilateral pelvic sentinel lymph node dissection, lysis of adhesions 30 minutes, omentectomy, cystoscopy;  Surgeon: Germania Espinal MD;  Location: [facility] [place]       Health Maintenance:  Last Pap Smear: No need for further pap smear exams s/p hysterectomy  She has not had a history of abnormal Pap smears.    Last Mammogram: 22              Result: normal      She has not had a history of abnormal mammograms.    Last Colonoscopy: 10/28/16              Result: Negative, repeat 10 years       Social History:  Lives with her , feels safe at home.  Works in retail part time.  Enjoys swimming, walking, spending time with friends, book clubs.  Does have an advanced directive on file and would like her son, Steve and her , Endy to be her POA.  Would like full resuscitation if reversible cause is identified, however would not like to be kept on life sustaining measures long-term.     Family History:   The patient's family history is significant for.  Family History   Problem Relation Age of Onset     Diabetes Type 2  Mother      Diabetes  Father      Alcoholism Father      No Known Problems Sister      No Known Problems Sister      No Known Problems Sister      Alcoholism Brother      Multiple myeloma Brother      Pancreatic Cancer Brother      No Known Problems Maternal Grandmother      No Known Problems Maternal Grandfather      No Known Problems Paternal Grandmother      No Known Problems Other      Breast Cancer Maternal Cousin          Physical Exam:   /77 (BP Location: Right arm, Patient Position: Sitting, Cuff Size: Adult Regular)   Pulse 76   Temp 98.7  F (37.1  C) (Oral)   Wt 82.1 kg (181 lb)   SpO2 98%   BMI 30.59 kg/m    Body mass index is 30.59 kg/m .    General Appearance: healthy and alert, no distress    Gastrointestinal:       abdomen soft, non-tender, non-distended, no organomegaly or masses    Genitourinary: External genitalia and urethral meatus appears normal.  Vagina is smooth with a normal appearing vaginal cuff and no signs of radiation changes at this time    Labs:  None       ECOG 0    Assessment:  Dixie Miller is a 61 year old woman with stage II, grade 3 endometrial adenocarcinoma     A total of 30 minutes was spent on patient care today.       Plan:     1.)    Stage II, grade 3 endometrial cancer, dMMR-She is doing well post-RT. Discussed signs and symptoms of recurrence and to call if these should occur.  Discussed role of surveillance with history and physical exam.  Imaging will be obtained per study protocol and is due next 1/23.  Discussed no need for further pap smear testing.  Discussed visits every 3 months for the first two years then every 6 months for up to 5 years then annually thereafter. She will return in 3 months.      2.) Genetic risk factors were assessed and the patient has undergone genetic testing which is pending    3.) Labs and/or tests ordered include:  CT C/A/P     4.) Health maintenance issues addressed today include pt is up to date.          Thank you for allowing us to  participate in the care of your patient.         Sincerely,    Germania Good MD  Gynecologic Oncology  [facility]       CC  MASTERS, EREN THURMAN

## 2022-10-04 ENCOUNTER — VIRTUAL VISIT (OUTPATIENT)
Dept: ONCOLOGY | Facility: CLINIC | Age: 62
End: 2022-10-04
Attending: GENETIC COUNSELOR, MS
Payer: COMMERCIAL

## 2022-10-04 DIAGNOSIS — Z80.3 FAMILY HISTORY OF MALIGNANT NEOPLASM OF BREAST: ICD-10-CM

## 2022-10-04 DIAGNOSIS — Z80.51 FAMILY HISTORY OF KIDNEY CANCER: ICD-10-CM

## 2022-10-04 DIAGNOSIS — Z80.7 FAMILY HISTORY OF MULTIPLE MYELOMA: ICD-10-CM

## 2022-10-04 DIAGNOSIS — Z80.41 FAMILY HISTORY OF MALIGNANT NEOPLASM OF OVARY: ICD-10-CM

## 2022-10-04 DIAGNOSIS — C54.1 ENDOMETRIAL CANCER (H): Primary | ICD-10-CM

## 2022-10-04 DIAGNOSIS — Z80.0 FAMILY HISTORY OF PANCREATIC CANCER: ICD-10-CM

## 2022-10-04 LAB — SCANNED LAB RESULT: NORMAL

## 2022-10-04 PROCEDURE — 999N000069 HC STATISTIC GENETIC COUNSELING, < 16 MIN: Mod: GT,95 | Performed by: GENETIC COUNSELOR, MS

## 2022-10-04 NOTE — LETTER
10/4/2022         RE: Dixie Miller  [address]  [address]  [facility] [address]        Dear Colleague,    Thank you for referring your patient, Dixie Miller, to the [facility]. Please see a copy of my visit note below.     Cancer Risk Management Program Genetic Counseling Note    10/4/2022    Referring Provider:  Dr. Germania Veliz    Presenting Information:  Dixie Miller (Connie) had a return video through the Cancer Risk Management Program, in order to discuss her genetic testing results. Her blood was drawn on 9-9-2022.  A BRCA1/BRCA2 genetic analysis with an automatic reflex to the Common Hereditary Cancer genetic testing panel was [facility]. This testing was done because of her personal history of uterine cancer and her family history of a pancreatic, ovarian, and other cancers.  Her  and one of her sisters was also present for this conversation.    Genetic Testing Result: NEGATIVE  Meliza is negative for mutations in the following genes :  APC, EVETTE, AXIN2, BARD1, BMPR1A, BRCA1, BRCA2, BRIP1, CDH1, CDK4, CDKN2A, CHEK2, CTNNA1, DICER1, EPCAM, GREM1, HOXB13, KIT, MEN1, MLH1, MSH2, MSH3, MSH6, MUTYH, NBN, NF1, NTHL1, PALB2, PDGFRA, PMS2, POLD1, POLE, PTEN,RAD50, RAD51C, RAD51D, SDHA, SDHB, SDHC, SDHD, SMAD4, SMARCA4, STK11, TP53,TSC1, TSC2, and VHL.      Therefore, genetic testing did not detect an identifiable mutation associated with Hereditary Breast and Ovarian Cancer syndrome (BRCA1, BRCA2), Hebert syndrome (MLH1, MSH2, MSH6, PMS2, EPCAM), Familial Adenomatous Polyposis (APC), Hereditary Diffuse Gastric Cancer (CDH1), Familial Atypical Multiple Mole Melanoma syndrome (CDK4, CDKN2A), Juvenile Polyposis syndrome (BMPR1A, SMAD4), Cowden syndrome (PTEN), Li Fraumeni syndrome (TP53), Peutz-Jeghers syndrome (STK11), MUTYH Associated Polyposis (MUTYH), Hereditary Paraganglioma and Pheochromocytoma syndrome (SDHA, SDHB, SDHC, SDHD),  "Tuberous sclerosis complex (TSC1, TSC2), Von Hippel-Lindau disease (VHL), Neurofibromatosis type 1 (NF1), and Multiple Endocrine Neoplasia type 1 (MEN1).    A copy of the test report can be found in the Laboratory tab, dated 9-9-2022, and named \"LABORATORY MISCELLANEOUS ORDER.\" The report is scanned in as a linked document.    Interpretation:  We discussed several different interpretations of this negative test result:    1. One explanation may be that there is a different gene or combination of genes and environment that are associated with the cancers in this family.  2. Another explanation may be that some of Meliza's relatives did have a mutation in one of these hereditary cancer genes, but she did not inherit it.    3. Similarly, it is possible that Meliza's uterine cancer was a \"sporadic\" cancer and not related to the inheritance of a mutation in a single hereditary cancer gene.  4. There is also a small possibility that there is a mutation in one of these genes, and the testing laboratory could not find it with their current testing methods.       Screening:  Based on this negative test result, it is important for Meliza and her relatives to refer back to the family history for appropriate cancer screening:     - We talked about that Meliza should continue to follow-up with her oncology team as previously recommended.      - We talked about that based on what is currently known about her family history (and her negative genetic testing results), there are not additional, increased cancer screening recommendations for Meliza at this time (beyond what is recommended for her uterine cancer surveillance).    - Other population cancer screening options, such as those recommended by the American Cancer Society and the National Comprehensive Cancer Network (NCCN), are also appropriate for Meliza and her family.     - These screening recommendations may change if there are changes to Meliza's personal and/or family " history of cancer. Final screening recommendations should be made by each individual's primary care provider.    Inheritance:  We reviewed the inheritance of mutations in these hereditary cancer genes. We discussed that for the genes that Meliza tested negative for, we would generally expect that Meliza would not have passed on an identifiable mutation in these genes to her children. Mutations in these genes do not skip generations.      Additional Testing Considerations:  It is possible Meliza does carry a gene or combination of genes and environment that increase her  risk for cancer that was not identifiable through this particular genetic testing panel. Meliza is encouraged to contact me in the future if she wants to know if there is additional genetic testing that might be available/indicated for her then or if she wishes to readdress larger gene panel options in the future.     Although Meliza's genetic testing result was negative, other relatives may still carry a gene mutation associated with an increase risk for cancer. Genetic counseling is recommended for other relatives with cancer (particularly Meliza's niece with early onset ovarian cancer) to discuss genetic testing options. In addition, we talked about that it is generally recommended that first-degree relatives (ie. siblings and children) of individuals with pancreatic cancer should consider hereditary cancer genetic testing; therefore, we talked about that Meliza's other siblings and the daughter of Meliza's brother with pancreatic cancer should consider genetic counseling/testing.  If any of these relatives do pursue genetic testing and a gene mutation is found, Meliza is encouraged to contact me so that we may review the impact of their test results on her.    Summary:  We do not have an explanation for Meliza's personal or family history of cancer. While no genetic changes were identified, Meliza may still be at risk for certain cancers due to  "family history, environmental factors, or other genetic causes not identified by this test.  Because of that, it is important that she continue with cancer screening based on her personal and family history as discussed above.    Genetic testing is rapidly advancing, and new cancer susceptibility genes will most likely be identified in the future. Therefore, I encouraged Meliza to contact me annually or if there are changes in her personal or family history. This may change how we assess her cancer risk, screening, and the testing we would offer.    Plan:  1.  I provided Meliza with a copy of her test results today through the [employer] portal.  She will also get a copy of this clinic note and a handout summarizing negative genetic test results (see \"patient instructions\").  2. Meliza plans to follow-up with her oncology team and her primary care provider as previously recommended.  3. Meliza should contact me periodically, or if her personal or family history of cancer changes, and she would like to know if there are additional screening or testing recommendations at that time.    If Meliza has any further questions, I encouraged her to contact me via [employer] or through email: [email or web address].    Jojo Panda MS, [facility]  Genetic Counselor  Cancer Risk Management Program    Time spent face to face over video: 15 minutes  "

## 2022-10-04 NOTE — PROGRESS NOTES
Meliza is a 61 year old who is being evaluated via a billable video visit.      How would you like to obtain your AVS? MyChart  If the video visit is dropped, the invitation should be resent by: Text to cell phone: [phone]  Will anyone else be joining your video visit? Yes: Sister, Carmenza . How would they like to receive their invitation? Text to cell phone: [phone] and  will be in the room w/ pt     Stefany PRADHAN      Video-Visit Details    Video Start Time: 8:05AM    Type of service:  Video Visit    Video End Time:  8:20AM    Originating Location (pt. Location): Home    Distant Location (provider location):  [facility]     Platform used for Video Visit: [employer]

## 2022-10-04 NOTE — LETTER
Cancer Risk Management  Program Locations    [facility]  [facility]  [facility]  Mailing Address  Cancer Risk Management Program  [facility]  [address]  [place] [address]    New patient appointments  [phone]  October 21, 2022    Dixie Miller  [address]    [facility] [address]      Dear Meliza,    It was a pleasure talking with you via your virtual genetic counseling visit on 10-4-2022.  Below is a copy of the progress note from that recent visit through the Cancer Risk Management Program.  If you have any additional questions, please feel free to contact me.     Cancer Risk Management Program Genetic Counseling Note    10/4/2022    Referring Provider:  Dr. Germania Veliz    Presenting Information:  Dixie Miller (Connie) had a return video through the Cancer Risk Management Program, in order to discuss her genetic testing results. Her blood was drawn on 9-9-2022.  A BRCA1/BRCA2 genetic analysis with an automatic reflex to the Common Hereditary Cancer genetic testing panel [facility]. This testing was done because of her personal history of uterine cancer and her family history of a pancreatic, ovarian, and other cancers.  Her  and one of her sisters was also present for this conversation.    Genetic Testing Result: NEGATIVE  Meliza is negative for mutations in the following genes :  APC, EVETTE, AXIN2, BARD1, BMPR1A, BRCA1, BRCA2, BRIP1, CDH1, CDK4, CDKN2A, CHEK2, CTNNA1, DICER1, EPCAM, GREM1, HOXB13, KIT, MEN1, MLH1, MSH2, MSH3, MSH6, MUTYH, NBN, NF1, NTHL1, PALB2, PDGFRA, PMS2, POLD1, POLE, PTEN,RAD50, RAD51C, RAD51D, SDHA, SDHB, SDHC, SDHD, SMAD4, SMARCA4, STK11, TP53,TSC1, TSC2, and VHL.      Therefore, genetic testing did not detect an identifiable mutation associated  "with Hereditary Breast and Ovarian Cancer syndrome (BRCA1, BRCA2), Hebert syndrome (MLH1, MSH2, MSH6, PMS2, EPCAM), Familial Adenomatous Polyposis (APC), Hereditary Diffuse Gastric Cancer (CDH1), Familial Atypical Multiple Mole Melanoma syndrome (CDK4, CDKN2A), Juvenile Polyposis syndrome (BMPR1A, SMAD4), Cowden syndrome (PTEN), Li Fraumeni syndrome (TP53), Peutz-Jeghers syndrome (STK11), MUTYH Associated Polyposis (MUTYH), Hereditary Paraganglioma and Pheochromocytoma syndrome (SDHA, SDHB, SDHC, SDHD), Tuberous sclerosis complex (TSC1, TSC2), Von Hippel-Lindau disease (VHL), Neurofibromatosis type 1 (NF1), and Multiple Endocrine Neoplasia type 1 (MEN1).    A copy of the test report can be found in the Laboratory tab, dated 9-9-2022, and named \"LABORATORY MISCELLANEOUS ORDER.\" The report is scanned in as a linked document.    Interpretation:  We discussed several different interpretations of this negative test result:    1. One explanation may be that there is a different gene or combination of genes and environment that are associated with the cancers in this family.  2. Another explanation may be that some of Meliza's relatives did have a mutation in one of these hereditary cancer genes, but she did not inherit it.    3. Similarly, it is possible that Meliza's uterine cancer was a \"sporadic\" cancer and not related to the inheritance of a mutation in a single hereditary cancer gene.  4. There is also a small possibility that there is a mutation in one of these genes, and the testing laboratory could not find it with their current testing methods.       Screening:  Based on this negative test result, it is important for Meliza and her relatives to refer back to the family history for appropriate cancer screening:     - We talked about that Meliza should continue to follow-up with her oncology team as previously recommended.      - We talked about that based on what is currently known about her family history (and her " negative genetic testing results), there are not additional, increased cancer screening recommendations for Meliza at this time (beyond what is recommended for her uterine cancer surveillance).    - Other population cancer screening options, such as those recommended by the American Cancer Society and the National Comprehensive Cancer Network (NCCN), are also appropriate for Meliza and her family.     - These screening recommendations may change if there are changes to Meliza's personal and/or family history of cancer. Final screening recommendations should be made by each individual's primary care provider.    Inheritance:  We reviewed the inheritance of mutations in these hereditary cancer genes. We discussed that for the genes that Meliza tested negative for, we would generally expect that Meliza would not have passed on an identifiable mutation in these genes to her children. Mutations in these genes do not skip generations.      Additional Testing Considerations:  It is possible Meliza does carry a gene or combination of genes and environment that increase her  risk for cancer that was not identifiable through this particular genetic testing panel. Meliza is encouraged to contact me in the future if she wants to know if there is additional genetic testing that might be available/indicated for her then or if she wishes to readdress larger gene panel options in the future.     Although Meliza's genetic testing result was negative, other relatives may still carry a gene mutation associated with an increase risk for cancer. Genetic counseling is recommended for other relatives with cancer (particularly Meliza's niece with early onset ovarian cancer) to discuss genetic testing options. In addition, we talked about that it is generally recommended that first-degree relatives (ie. siblings and children) of individuals with pancreatic cancer should consider hereditary cancer genetic testing; therefore, we talked about  "that Meliza's other siblings and the daughter of Meliza's brother with pancreatic cancer should consider genetic counseling/testing.  If any of these relatives do pursue genetic testing and a gene mutation is found, Meliza is encouraged to contact me so that we may review the impact of their test results on her.    Summary:  We do not have an explanation for Meliza's personal or family history of cancer. While no genetic changes were identified, Meliza may still be at risk for certain cancers due to family history, environmental factors, or other genetic causes not identified by this test.  Because of that, it is important that she continue with cancer screening based on her personal and family history as discussed above.    Genetic testing is rapidly advancing, and new cancer susceptibility genes will most likely be identified in the future. Therefore, I encouraged Meliza to contact me annually or if there are changes in her personal or family history. This may change how we assess her cancer risk, screening, and the testing we would offer.    Plan:  1.  I provided Meliza with a copy of her test results today through the [employer] portal.  She will also get a copy of this clinic note and a handout summarizing negative genetic test results (see \"patient instructions\").  2. Meliza plans to follow-up with her oncology team and her primary care provider as previously recommended.  3. Meliza should contact me periodically, or if her personal or family history of cancer changes, and she would like to know if there are additional screening or testing recommendations at that time.    If Meliza has any further questions, I encouraged her to contact me via [employer] or through email: [email or web address].    Jojo Panda MS, [facility]  Genetic Counselor  Cancer Risk Management Program    Time spent face to face over video: 15 minutes                                "

## 2022-10-04 NOTE — PROGRESS NOTES
" Cancer Risk Management Program Genetic Counseling Note    10/4/2022    Referring Provider:  Dr. Germania Veliz    Presenting Information:  Dixie Miller (Connie) had a return video through the Cancer Risk Management Program, in order to discuss her genetic testing results. Her blood was drawn on 9-9-2022.  A BRCA1/BRCA2 genetic analysis with an automatic reflex to the Common Hereditary Cancer genetic testing panel was requested from [facility]. This testing was done because of her personal history of uterine cancer and her family history of a pancreatic, ovarian, and other cancers.  Her  and one of her sisters was also present for this conversation.    Genetic Testing Result: NEGATIVE  Meliza is negative for mutations in the following genes :  APC, EVETTE, AXIN2, BARD1, BMPR1A, BRCA1, BRCA2, BRIP1, CDH1, CDK4, CDKN2A, CHEK2, CTNNA1, DICER1, EPCAM, GREM1, HOXB13, KIT, MEN1, MLH1, MSH2, MSH3, MSH6, MUTYH, NBN, NF1, NTHL1, PALB2, PDGFRA, PMS2, POLD1, POLE, PTEN,RAD50, RAD51C, RAD51D, SDHA, SDHB, SDHC, SDHD, SMAD4, SMARCA4, STK11, TP53,TSC1, TSC2, and VHL.      Therefore, genetic testing did not detect an identifiable mutation associated with Hereditary Breast and Ovarian Cancer syndrome (BRCA1, BRCA2), Hebert syndrome (MLH1, MSH2, MSH6, PMS2, EPCAM), Familial Adenomatous Polyposis (APC), Hereditary Diffuse Gastric Cancer (CDH1), Familial Atypical Multiple Mole Melanoma syndrome (CDK4, CDKN2A), Juvenile Polyposis syndrome (BMPR1A, SMAD4), Cowden syndrome (PTEN), Li Fraumeni syndrome (TP53), Peutz-Jeghers syndrome (STK11), MUTYH Associated Polyposis (MUTYH), Hereditary Paraganglioma and Pheochromocytoma syndrome (SDHA, SDHB, SDHC, SDHD), Tuberous sclerosis complex (TSC1, TSC2), Von Hippel-Lindau disease (VHL), Neurofibromatosis type 1 (NF1), and Multiple Endocrine Neoplasia type 1 (MEN1).    A copy of the test report can be found in the Laboratory tab, dated 9-9-2022, and named \"LABORATORY MISCELLANEOUS ORDER.\" " "The report is scanned in as a linked document.    Interpretation:  We discussed several different interpretations of this negative test result:    1. One explanation may be that there is a different gene or combination of genes and environment that are associated with the cancers in this family.  2. Another explanation may be that some of Meliza's relatives did have a mutation in one of these hereditary cancer genes, but she did not inherit it.    3. Similarly, it is possible that Meliza's uterine cancer was a \"sporadic\" cancer and not related to the inheritance of a mutation in a single hereditary cancer gene.  4. There is also a small possibility that there is a mutation in one of these genes, and the testing laboratory could not find it with their current testing methods.       Screening:  Based on this negative test result, it is important for Meliza and her relatives to refer back to the family history for appropriate cancer screening:     - We talked about that Meliza should continue to follow-up with her oncology team as previously recommended.      - We talked about that based on what is currently known about her family history (and her negative genetic testing results), there are not additional, increased cancer screening recommendations for Meliza at this time (beyond what is recommended for her uterine cancer surveillance).    - Other population cancer screening options, such as those recommended by the American Cancer Society and the National Comprehensive Cancer Network (NCCN), are also appropriate for Meliza and her family.     - These screening recommendations may change if there are changes to Meliza's personal and/or family history of cancer. Final screening recommendations should be made by each individual's primary care provider.    Inheritance:  We reviewed the inheritance of mutations in these hereditary cancer genes. We discussed that for the genes that Meliza tested negative for, we would " generally expect that Meliza would not have passed on an identifiable mutation in these genes to her children. Mutations in these genes do not skip generations.      Additional Testing Considerations:  It is possible Meliza does carry a gene or combination of genes and environment that increase her  risk for cancer that was not identifiable through this particular genetic testing panel. Meliza is encouraged to contact me in the future if she wants to know if there is additional genetic testing that might be available/indicated for her then or if she wishes to readdress larger gene panel options in the future.     Although Meliza's genetic testing result was negative, other relatives may still carry a gene mutation associated with an increase risk for cancer. Genetic counseling is recommended for other relatives with cancer (particularly Meliza's niece with early onset ovarian cancer) to discuss genetic testing options. In addition, we talked about that it is generally recommended that first-degree relatives (ie. siblings and children) of individuals with pancreatic cancer should consider hereditary cancer genetic testing; therefore, we talked about that Meliza's other siblings and the daughter of Meliza's brother with pancreatic cancer should consider genetic counseling/testing.  If any of these relatives do pursue genetic testing and a gene mutation is found, Meliza is encouraged to contact me so that we may review the impact of their test results on her.    Summary:  We do not have an explanation for Meliza's personal or family history of cancer. While no genetic changes were identified, Meliza may still be at risk for certain cancers due to family history, environmental factors, or other genetic causes not identified by this test.  Because of that, it is important that she continue with cancer screening based on her personal and family history as discussed above.    Genetic testing is rapidly advancing, and new  "cancer susceptibility genes will most likely be identified in the future. Therefore, I encouraged Meliza to contact me annually or if there are changes in her personal or family history. This may change how we assess her cancer risk, screening, and the testing we would offer.    Plan:  1.  I provided Meliza with a copy of her test results today through the [employer] portal.  She will also get a copy of this clinic note and a handout summarizing negative genetic test results (see \"patient instructions\").  2. Meliza plans to follow-up with her oncology team and her primary care provider as previously recommended.  3. Meliza should contact me periodically, or if her personal or family history of cancer changes, and she would like to know if there are additional screening or testing recommendations at that time.    If Meliza has any further questions, I encouraged her to contact me via [employer] or through email: [email or web address].    Jojo Panda MS, [facility]  Genetic Counselor  Cancer Risk Management Program    Time spent face to face over video: 15 minutes    "

## 2022-10-04 NOTE — PATIENT INSTRUCTIONS
Negative Genetic Test Result    Genetic Testing  You had a blood test that looked at the genetic information in one or more genes associated with increased cancer risk.  The testing looked for any harmful changes that would stop this particular gene from working like it should. If an individual does not have any harmful changes or variants of unknown significance found from their blood test, their genetic test result is reported as negative.       Results  The genetic test did not identify any pathogenic (harmful) changes in the genes that were tested. There are several possible explanations for a negative test result. Without knowing the gene mutation in your family, the cause of the cancer in you or your relatives is still unknown. Your genetic counselor can help interpret the result for you and your relatives. In this case, there are several reasons that may explain the negative test result:  There may be a gene mutation in the family that you did not inherit.   You may have a gene mutation in a different gene that was not included in the test, or has not yet been discovered.   The cancers in you or your family may be due to a combination of genetic factors and environment (multifactorial/familial).  The cancers in you or your family may be sporadic/random cancers.  There is very small chance that a mutation was not found by current testing methods.  As testing technology evolves over time, it may still be possible to identify a mutation in a gene that was not found on this test.    It is important to note which genes were included in your test. A list of these genes can be found on your test result.    Screening Recommendations  Due to this negative test result, cancer screening recommendations should be based on your personal and family history. This may include increased cancer screening for you and/or your family members. Your genetic counselor and health care provider can help make appropriate  recommendations.      Please call us if you have any questions or concerns.   Cancer Risk Management Program [phone] ([phone])  Phong Jones, MS [facility]  [phone]  Thais Martinez, MS, [facility] [phone]  ANNIKA Panda, MS, [facility]  [phone]    [email or web address]  Vera Rodriguez, MS, [facility]  [phone]  Meri Broderick, MS, [facility]  [phone]  Yari Guzman, MS, [facility] [phone]  Stefanie Faye, MS, [facility] [phone]

## 2022-10-17 NOTE — PROGRESS NOTES
Follow Up Notes on Referred Patient    Date: 10/18/2022         RE: Dixie Miller  : 1960  DERICK: 10/18/2022          Dixie Miller is a 61 year old woman with stage II, grade 3 endometrial adenocarcinoma, dMMR type. She is on study . She is here today for follow up.     Cancer Course:  Patient initially presented with a month history of PMB with cramping.  This was mostly light spotting and she has not had any heavy bleeding. She also has been having a rash which is patchy, itchy and moves to several areas of her body for the past several months.  She had a biopsy which showed non-specific dermatitis.  She has follow up with dermatology for a second opinion later this week.     22:  US pelvis:  Uterus: anteverted. Contour is smooth/regular. Size: 8.21 x 5.20 x 4.44 cm Endometrium: Thickness Total 24.00 mm Findings: Thick Right Ovary: Not seen Left Ovary: 1.87 x 1.61 x .95 cm. Wnl Cul de Sac Free Fluid: No free fluid     22:  EMB:  High grade carcinoma with squamous differentiation     22:  : 24     6/3/22:  CT C/A/P:  1.  1 cm nodule in the right breast centrally. 2.  5 mm on the nodule in the lateral right lower lobe. 3.  Low dense lesion too small to characterize in the liver is probably a cyst. 4.  Markedly thickened endometrium compatible with history. 5.  No adenopathy. 6.  L2 compression deformity.     22:  Total laparoscopic hysterectomy, bilateral salpingo-oophorectomy, bilateral pelvic sentinel lymph node dissection, lysis of adhesions for > 30 minutes, omentectomy, cystoscopy                 Pathology:  Grade 3 endometrial adenocarcinoma with extensive squamous differentiation, tumor size 6.6 cm, 9/13 mm myometrial invasion, + JENNIFER, + cervix, +LVSI, 0/11 sentinel LN, negative omentum, Loss of MLH1/PMS2 with + hypermethylation, p53 normal     22-22:  Completed XRT per  study protocol (Control arm)         Obstetrics and  Gynecology History:  ,  x 2, c/s x 1  Menopause around age 54, no HRT use            Per Meliza, she is doing well and is without gynecological concerns. Fatigue is much improved. Urgency with urine and stool have improved. No incontinence. Pt is not using vaginal dilator. She denies any vaginal bleeding, no changes in her bowel or bladder habits, no nausea/emesis, no lower extremity edema, and no difficulties eating or sleeping. She denies any abdominal discomfort/bloating, no fevers or chills, and no chest pain or shortness of breath. On Lexapro for anxiety which is controlled.  son and wife had twins at 29 week gestation. Doing well and growing in NICU. She has completed her RT and is doing well and recovering.              Health Maintenance  Colonoscopy-  due in 10 years   Mammogram-2022  DEXA-  negative   Annual physical-2022      Review of Systems:    Systemic           no weight changes; no fever; no chills; no night sweats; no appetite changes  Skin           no rashes, or lesions  Eye           no irritation; no changes in vision  Kika-Laryngeal           no dysphagia; no hoarseness   Pulmonary    no cough; no shortness of breath  Cardiovascular    no chest pain; no palpitations  Gastrointestinal    no diarrhea; no constipation; no abdominal pain; no changes in bowel  habits; no blood in stool  Genitourinary   no urinary frequency; no urinary urgency; no dysuria; no pain; no abnormal vaginal discharge; no abnormal vaginal bleeding  Breast    no breast discharge; no breast changes; no breast pain  Musculoskeletal    no myalgias; no arthralgias; no back pain  Psychiatric           no depressed mood; no anxiety    Hematologic           no tender lymph nodes; no noticeable swellings or lumps   Endocrine    no hot flashes; no heat/cold intolerance         Neurological   no tremor; no numbness and tingling; no headaches; no difficulty  sleeping      Past Medical History:    Past Medical  History:   Diagnosis Date     Anxiety      Benign essential hypertension     added 2nd med 9/15     Depressive disorder      Dysthymia      Endometrial cancer (H)      High cholesterol      IBS (irritable bowel syndrome)          Past Surgical History:    Past Surgical History:   Procedure Laterality Date      SECTION       LAPAROSCOPIC HYSTERECTOMY TOTAL, BILATERAL SALPINGO-OOPHORECTOMY, COMBINED N/A 2022    Procedure: laparoscopic removal of uterus, cervix, bilateral ovaries and fallopian tubes, bilateral pelvic sentinel lymph node dissection, lysis of adhesions 30 minutes, omentectomy, cystoscopy;  Surgeon: Germania Espinal MD;  [place] [facility] [place]         Health Maintenance Due   Topic Date Due     ADVANCE CARE PLANNING  Never done     DEPRESSION ACTION PLAN  Never done     HEPATITIS B IMMUNIZATION (1 of 3 - 3-dose series) Never done     HIV SCREENING  Never done     ZOSTER IMMUNIZATION (1 of 2) Never done     PHQ-9  2021     COVID-19 Vaccine (4 - Booster for Moderna series) 2022       Current Medications:     Current Outpatient Medications   Medication Sig Dispense Refill     amphetamine-dextroamphetamine (ADDERALL) 5 MG tablet Take by mouth 2 times daily       Ascorbic Acid (VITAMIN C GUMMIES PO)        escitalopram (LEXAPRO) 10 MG tablet TAKE 1 TABLET BY MOUTH EVERY DAY 30 tablet 0     Multiple Vitamins-Minerals (MULTI ADULT GUMMIES PO)            Allergies:      No Known Allergies     Social History:     Social History     Tobacco Use     Smoking status: Never     Smokeless tobacco: Never   Substance Use Topics     Alcohol use: Yes     Alcohol/week: 0.0 standard drinks     Comment: 3 wine a day       History   Drug Use No         Family History:     The patient's family history is notable for     Family History   Problem Relation Age of Onset     Diabetes Type 2  Mother      Diabetes Father      Alcoholism Father      No Known Problems Sister      No Known Problems  Sister      No Known Problems Sister      Alcoholism Brother      Multiple myeloma Brother      Pancreatic Cancer Brother      No Known Problems Maternal Grandmother      No Known Problems Maternal Grandfather      No Known Problems Paternal Grandmother      No Known Problems Other      Breast Cancer Maternal Cousin          Physical Exam:     BP (!) 140/73   Pulse 74   Temp 98.6  F (37  C) (Oral)   Resp 16   Wt 84.6 kg (186 lb 6.4 oz)   SpO2 98%   BMI 31.50 kg/m    Body mass index is 31.5 kg/m .    General Appearance: healthy and alert, no distress     HEENT: no thyromegaly, no palpable nodules or masses        Cardiovascular: regular rate and rhythm, no gallops, rubs or murmurs     Respiratory: lungs clear, no rales, rhonchi or wheezes    Musculoskeletal: extremities non tender and without edema    Skin: no lesions or rashes     Neurological: normal gait, no gross defects     Psychiatric: appropriate mood and affect                               Hematological: normal cervical, supraclavicular and inguinal lymph nodes     Gastrointestinal:       abdomen soft, non-tender, non-distended, no organomegaly or masses; laparoscopic incisions intact and well healed    Genitourinary: Deferred      ECOG 0        Assessment:    Dixie Miller is a 61 year old woman with stage II, grade 3 endometrial adenocarcinoma, dMMR type. She is on study . She is here today for follow up.     25 minutes spent on the date of the encounter doing chart review, history and exam, documentation, and further activities as noted above.          Plan:     1.)       Stage II, grade 3 endometrial cancer, dMMR- doing well post RT and is without any concerning s/s for recurrence. Reviewed signs and symptoms for when she should contact the clinic or seek additional care. Patient to contact the clinic with any questions or concerns in the interim. CT scan and Florentino visit are set up for January.       2.) Genetic risk factors were  assessed and the patient has undergone genetic testing which is negative.     3.) Labs and/or tests ordered include: none      4.) Health maintenance issues addressed today include annual health maintenance and non-gynecologic issues with PCP. Encouraged DEXA in 2023 with PCP. Reviewed pelvic floor PT referral if needed in the future for any pelvic floor weakness or urinary/fecal concern. Right now, much improved per pt.               TRENT Washington, NP-BC  Women's Health Nurse Practitioner  Division of Gynecologic Oncology  [facility]        CC  Patient Care Team:  Brown, Merlin Emery, MD as PCP - General (Internal Medicine)  Masters, Stefanie Cho DO as Assigned OBGYN Provider  Germania Espinal MD as MD (Gynecologic Oncology)  Isabel Tierney, RN as Specialty Care Coordinator (Gynecologic Oncology)  Germania Espinal MD as Assigned Cancer Care Provider  Sarah Simeon, RN as Specialty Care Coordinator (Hematology & Oncology)     Agree with assessment and medication recommendations    Germania Good MD  Gynecologic Oncology  [facility]

## 2022-10-18 ENCOUNTER — ALLIED HEALTH/NURSE VISIT (OUTPATIENT)
Dept: ONCOLOGY | Facility: CLINIC | Age: 62
End: 2022-10-18

## 2022-10-18 ENCOUNTER — ONCOLOGY VISIT (OUTPATIENT)
Dept: ONCOLOGY | Facility: CLINIC | Age: 62
End: 2022-10-18
Attending: OBSTETRICS & GYNECOLOGY
Payer: COMMERCIAL

## 2022-10-18 VITALS
DIASTOLIC BLOOD PRESSURE: 73 MMHG | RESPIRATION RATE: 16 BRPM | TEMPERATURE: 98.6 F | HEART RATE: 74 BPM | BODY MASS INDEX: 31.5 KG/M2 | OXYGEN SATURATION: 98 % | WEIGHT: 186.4 LBS | SYSTOLIC BLOOD PRESSURE: 140 MMHG

## 2022-10-18 DIAGNOSIS — Z00.6 CLINICAL TRIAL PARTICIPANT: Primary | ICD-10-CM

## 2022-10-18 DIAGNOSIS — C54.1 ENDOMETRIAL CANCER (H): Primary | ICD-10-CM

## 2022-10-18 DIAGNOSIS — Z00.6 EXAMINATION OF PARTICIPANT IN CLINICAL TRIAL: ICD-10-CM

## 2022-10-18 PROCEDURE — G0463 HOSPITAL OUTPT CLINIC VISIT: HCPCS

## 2022-10-18 PROCEDURE — 99213 OFFICE O/P EST LOW 20 MIN: CPT | Performed by: OBSTETRICS & GYNECOLOGY

## 2022-10-18 ASSESSMENT — PAIN SCALES - GENERAL: PAINLEVEL: NO PAIN (0)

## 2022-10-18 NOTE — LETTER
10/18/2022         RE: Dixie Miller  [address]  [address]  [place] [address]        Dear Colleague,    Thank you for referring your patient, Dixie Miller, to the [facility]. Please see a copy of my visit note below.                    Follow Up Notes on Referred Patient    Date: 10/18/2022         RE: Dixie Miller  : 1960  DERICK: 10/18/2022          Dixie Miller is a 61 year old woman with stage II, grade 3 endometrial adenocarcinoma, dMMR type. She is on study . She is here today for follow up.     Cancer Course:  Patient initially presented with a month history of PMB with cramping.  This was mostly light spotting and she has not had any heavy bleeding. She also has been having a rash which is patchy, itchy and moves to several areas of her body for the past several months.  She had a biopsy which showed non-specific dermatitis.  She has follow up with dermatology for a second opinion later this week.     22:  US pelvis:  Uterus: anteverted. Contour is smooth/regular. Size: 8.21 x 5.20 x 4.44 cm Endometrium: Thickness Total 24.00 mm Findings: Thick Right Ovary: Not seen Left Ovary: 1.87 x 1.61 x .95 cm. Wnl Cul de Sac Free Fluid: No free fluid     22:  EMB:  High grade carcinoma with squamous differentiation     22:  : 24     6/3/22:  CT C/A/P:  1.  1 cm nodule in the right breast centrally. 2.  5 mm on the nodule in the lateral right lower lobe. 3.  Low dense lesion too small to characterize in the liver is probably a cyst. 4.  Markedly thickened endometrium compatible with history. 5.  No adenopathy. 6.  L2 compression deformity.     22:  Total laparoscopic hysterectomy, bilateral salpingo-oophorectomy, bilateral pelvic sentinel lymph node dissection, lysis of adhesions for > 30 minutes, omentectomy, cystoscopy                 Pathology:  Grade 3 endometrial adenocarcinoma with extensive squamous  differentiation, tumor size 6.6 cm, 9/13 mm myometrial invasion, + JENNIFER, + cervix, +LVSI, 0/11 sentinel LN, negative omentum, Loss of MLH1/PMS2 with + hypermethylation, p53 normal     22-22:  Completed XRT per  study protocol (Control arm)         Obstetrics and Gynecology History:  ,  x 2, c/s x 1  Menopause around age 54, no HRT use            Per Meliza, she is doing well and is without gynecological concerns. Fatigue is much improved. Urgency with urine and stool have improved. No incontinence. Pt is not using vaginal dilator. She denies any vaginal bleeding, no changes in her bowel or bladder habits, no nausea/emesis, no lower extremity edema, and no difficulties eating or sleeping. She denies any abdominal discomfort/bloating, no fevers or chills, and no chest pain or shortness of breath. On Lexapro for anxiety which is controlled.  son and wife had twins at 29 week gestation. Doing well and growing in NICU. She has completed her RT and is doing well and recovering.              Health Maintenance  Colonoscopy- 2016 due in 10 years   Mammogram-2022  DEXA-  negative   Annual physical-2022      Review of Systems:    Systemic           no weight changes; no fever; no chills; no night sweats; no appetite changes  Skin           no rashes, or lesions  Eye           no irritation; no changes in vision  Kika-Laryngeal           no dysphagia; no hoarseness   Pulmonary    no cough; no shortness of breath  Cardiovascular    no chest pain; no palpitations  Gastrointestinal    no diarrhea; no constipation; no abdominal pain; no changes in bowel  habits; no blood in stool  Genitourinary   no urinary frequency; no urinary urgency; no dysuria; no pain; no abnormal vaginal discharge; no abnormal vaginal bleeding  Breast    no breast discharge; no breast changes; no breast pain  Musculoskeletal    no myalgias; no arthralgias; no back pain  Psychiatric           no depressed mood; no anxiety     Hematologic           no tender lymph nodes; no noticeable swellings or lumps   Endocrine    no hot flashes; no heat/cold intolerance         Neurological   no tremor; no numbness and tingling; no headaches; no difficulty  sleeping      Past Medical History:    Past Medical History:   Diagnosis Date     Anxiety      Benign essential hypertension     added 2nd med 9/15     Depressive disorder      Dysthymia      Endometrial cancer (H)      High cholesterol      IBS (irritable bowel syndrome)          Past Surgical History:    Past Surgical History:   Procedure Laterality Date      SECTION       LAPAROSCOPIC HYSTERECTOMY TOTAL, BILATERAL SALPINGO-OOPHORECTOMY, COMBINED N/A 2022    Procedure: laparoscopic removal of uterus, cervix, bilateral ovaries and fallopian tubes, bilateral pelvic sentinel lymph node dissection, lysis of adhesions 30 minutes, omentectomy, cystoscopy;  Surgeon: Germania Espinal MD;  [place] [facility] [place]         Health Maintenance Due   Topic Date Due     ADVANCE CARE PLANNING  Never done     DEPRESSION ACTION PLAN  Never done     HEPATITIS B IMMUNIZATION (1 of 3 - 3-dose series) Never done     HIV SCREENING  Never done     ZOSTER IMMUNIZATION (1 of 2) Never done     PHQ-9  2021     COVID-19 Vaccine (4 - Booster for Moderna series) 2022       Current Medications:     Current Outpatient Medications   Medication Sig Dispense Refill     amphetamine-dextroamphetamine (ADDERALL) 5 MG tablet Take by mouth 2 times daily       Ascorbic Acid (VITAMIN C GUMMIES PO)        escitalopram (LEXAPRO) 10 MG tablet TAKE 1 TABLET BY MOUTH EVERY DAY 30 tablet 0     Multiple Vitamins-Minerals (MULTI ADULT GUMMIES PO)            Allergies:      No Known Allergies     Social History:     Social History     Tobacco Use     Smoking status: Never     Smokeless tobacco: Never   Substance Use Topics     Alcohol use: Yes     Alcohol/week: 0.0 standard drinks     Comment: 3 wine a day        History   Drug Use No         Family History:     The patient's family history is notable for     Family History   Problem Relation Age of Onset     Diabetes Type 2  Mother      Diabetes Father      Alcoholism Father      No Known Problems Sister      No Known Problems Sister      No Known Problems Sister      Alcoholism Brother      Multiple myeloma Brother      Pancreatic Cancer Brother      No Known Problems Maternal Grandmother      No Known Problems Maternal Grandfather      No Known Problems Paternal Grandmother      No Known Problems Other      Breast Cancer Maternal Cousin          Physical Exam:     BP (!) 140/73   Pulse 74   Temp 98.6  F (37  C) (Oral)   Resp 16   Wt 84.6 kg (186 lb 6.4 oz)   SpO2 98%   BMI 31.50 kg/m    Body mass index is 31.5 kg/m .    General Appearance: healthy and alert, no distress     HEENT: no thyromegaly, no palpable nodules or masses        Cardiovascular: regular rate and rhythm, no gallops, rubs or murmurs     Respiratory: lungs clear, no rales, rhonchi or wheezes    Musculoskeletal: extremities non tender and without edema    Skin: no lesions or rashes     Neurological: normal gait, no gross defects     Psychiatric: appropriate mood and affect                               Hematological: normal cervical, supraclavicular and inguinal lymph nodes     Gastrointestinal:       abdomen soft, non-tender, non-distended, no organomegaly or masses; laparoscopic incisions intact and well healed    Genitourinary: Deferred      ECOG 0        Assessment:    Dixie Miller is a 61 year old woman with stage II, grade 3 endometrial adenocarcinoma, dMMR type. She is on study . She is here today for follow up.     25 minutes spent on the date of the encounter doing chart review, history and exam, documentation, and further activities as noted above.          Plan:     1.)       Stage II, grade 3 endometrial cancer, dMMR- doing well post RT and is without any concerning s/s  "for recurrence. Reviewed signs and symptoms for when she should contact the clinic or seek additional care. Patient to contact the clinic with any questions or concerns in the interim. CT scan and Florentino visit are set up for January.       2.) Genetic risk factors were assessed and the patient has undergone genetic testing which is negative.     3.) Labs and/or tests ordered include: none      4.) Health maintenance issues addressed today include annual health maintenance and non-gynecologic issues with PCP. Encouraged DEXA in 2023 with PCP. Reviewed pelvic floor PT referral if needed in the future for any pelvic floor weakness or urinary/fecal concern. Right now, much improved per pt.               TRENT Washington, NP-BC  Women's Health Nurse Practitioner  Division of Gynecologic Oncology  [facility]        CC  Patient Care Team:  Brown, Merlin Emery, MD as PCP - General (Internal Medicine)  Masters, Stefanie Cho DO as Assigned OBGYN Provider  Germania Espinal MD as MD (Gynecologic Oncology)  Isabel Tierney, RN as Specialty Care Coordinator (Gynecologic Oncology)  Germania Espinal MD as Assigned Cancer Care Provider  Sarah Simeon, RN as Specialty Care Coordinator (Hematology & Oncology)     Agree with assessment and medication recommendations    Germania Good MD  Gynecologic Oncology  [facility]        Oncology Rooming Note    October 18, 2022 9:01 AM   Dixie Miller is a 61 year old female who presents for:    Chief Complaint   Patient presents with     Oncology Clinic Visit     Initial Vitals: BP (!) 140/73   Pulse 74   Temp 98.6  F (37  C) (Oral)   Resp 16   Wt 84.6 kg (186 lb 6.4 oz)   SpO2 98%   BMI 31.50 kg/m   Estimated body mass index is 31.5 kg/m  as calculated from the following:    Height as of 7/12/22: 1.638 m (5' 4.5\").    Weight as of this encounter: 84.6 kg (186 lb 6.4 oz). Body surface area is 1.96 " meters squared.  No Pain (0) Comment: Data Unavailable   No LMP recorded. Patient is postmenopausal.  Allergies reviewed: Yes  Medications reviewed: Yes    Medications: Medication refills not needed today.  Pharmacy name entered into [employer]: [facility] [id number] [place] [address]    Clinical concerns: no      Claritza Pacheco, [facility]                Again, thank you for allowing me to participate in the care of your patient.        Sincerely,        TRENT Beach CNP

## 2022-10-18 NOTE — PROGRESS NOTES
Patient on control arm of clinical trial -radiation only. Here for week 13 visit. To see provider Princess Bojorquez for assessment. No labs needed per study or Dr. Good.    Weight/Vitals: Reviewed.    Adverse Events: Reviewed. Reports fatigue resolved 3 weeks after last RT dose.     ECOG PS: 0    Concurrent Medications: Reviewed. No new meds. She stopped senna and miralax when diarrhea started during RT and has not restarted meds.     Next visit: Scheduled for week 25 visit on 1/13/23 with Dr. Good. CT scan scheduled for 1/11/23. Electronic QOL's will be due at that time. Sent request to scheduling for appointment: Meliza Craig is a study patient. Please schedule her for     Tuesday 4/4/22   08:40am Princess Bojorquez, NP     She said the above date/time would work for her. She is requesting a [employer] message confirmation if possible.        Patient understands to call triage RN line for symptoms.     Debi Sterling, [facility] RN at [facility]

## 2022-10-18 NOTE — PROGRESS NOTES
"Oncology Rooming Note    October 18, 2022 9:01 AM   Dixie Miller is a 61 year old female who presents for:    Chief Complaint   Patient presents with     Oncology Clinic Visit     Initial Vitals: BP (!) 140/73   Pulse 74   Temp 98.6  F (37  C) (Oral)   Resp 16   Wt 84.6 kg (186 lb 6.4 oz)   SpO2 98%   BMI 31.50 kg/m   Estimated body mass index is 31.5 kg/m  as calculated from the following:    Height as of 7/12/22: 1.638 m (5' 4.5\").    Weight as of this encounter: 84.6 kg (186 lb 6.4 oz). Body surface area is 1.96 meters squared.  No Pain (0) Comment: Data Unavailable   No LMP recorded. Patient is postmenopausal.  Allergies reviewed: Yes  Medications reviewed: Yes    Medications: Medication refills not needed today.  Pharmacy name entered into [employer]: [facility] [id number] [place] [address]    Clinical concerns: no      Claritza Pacheco CMA            "

## 2022-11-21 ENCOUNTER — APPOINTMENT (OUTPATIENT)
Dept: URBAN - METROPOLITAN AREA CLINIC 256 | Age: 62
Setting detail: DERMATOLOGY
End: 2022-11-21

## 2022-11-21 VITALS — WEIGHT: 179 LBS | HEIGHT: 65 IN

## 2022-11-21 DIAGNOSIS — D485 NEOPLASM OF UNCERTAIN BEHAVIOR OF SKIN: ICD-10-CM

## 2022-11-21 DIAGNOSIS — D22 MELANOCYTIC NEVI: ICD-10-CM

## 2022-11-21 DIAGNOSIS — L57.8 OTHER SKIN CHANGES DUE TO CHRONIC EXPOSURE TO NONIONIZING RADIATION: ICD-10-CM

## 2022-11-21 DIAGNOSIS — D18.0 HEMANGIOMA: ICD-10-CM

## 2022-11-21 DIAGNOSIS — L82.1 OTHER SEBORRHEIC KERATOSIS: ICD-10-CM

## 2022-11-21 DIAGNOSIS — Z71.89 OTHER SPECIFIED COUNSELING: ICD-10-CM

## 2022-11-21 PROBLEM — D22.5 MELANOCYTIC NEVI OF TRUNK: Status: ACTIVE | Noted: 2022-11-21

## 2022-11-21 PROBLEM — D22.72 MELANOCYTIC NEVI OF LEFT LOWER LIMB, INCLUDING HIP: Status: ACTIVE | Noted: 2022-11-21

## 2022-11-21 PROBLEM — D18.01 HEMANGIOMA OF SKIN AND SUBCUTANEOUS TISSUE: Status: ACTIVE | Noted: 2022-11-21

## 2022-11-21 PROBLEM — D48.5 NEOPLASM OF UNCERTAIN BEHAVIOR OF SKIN: Status: ACTIVE | Noted: 2022-11-21

## 2022-11-21 PROCEDURE — OTHER COUNSELING: OTHER

## 2022-11-21 PROCEDURE — OTHER MONITORING: OTHER

## 2022-11-21 PROCEDURE — 99213 OFFICE O/P EST LOW 20 MIN: CPT

## 2022-11-21 PROCEDURE — OTHER MIPS QUALITY: OTHER

## 2022-11-21 ASSESSMENT — LOCATION DETAILED DESCRIPTION DERM
LOCATION DETAILED: LEFT SUPERIOR MEDIAL MIDBACK
LOCATION DETAILED: LEFT INFERIOR UPPER BACK
LOCATION DETAILED: LEFT SUPERIOR LATERAL LOWER BACK
LOCATION DETAILED: LEFT SUPERIOR UPPER BACK
LOCATION DETAILED: RIGHT SUPERIOR LATERAL BUCCAL CHEEK
LOCATION DETAILED: LEFT PROXIMAL PRETIBIAL REGION
LOCATION DETAILED: LEFT MEDIAL UPPER BACK

## 2022-11-21 ASSESSMENT — LOCATION SIMPLE DESCRIPTION DERM
LOCATION SIMPLE: LEFT PRETIBIAL REGION
LOCATION SIMPLE: LEFT UPPER BACK
LOCATION SIMPLE: LEFT LOWER BACK
LOCATION SIMPLE: RIGHT CHEEK

## 2022-11-21 ASSESSMENT — LOCATION ZONE DERM
LOCATION ZONE: FACE
LOCATION ZONE: TRUNK
LOCATION ZONE: LEG

## 2022-11-21 NOTE — PROCEDURE: COUNSELING
Detail Level: Generalized
Detail Level: Zone
Detail Level: Detailed
Patient Specific Counseling (Will Not Stick From Patient To Patient): It is unclear what this lesion represents as it has been treated in prior years but could be evolving some actinic keratoses within the plaque--we will need to monitor this
Patient Specific Counseling (Will Not Stick From Patient To Patient): Discussed the importance of using a long sleeve shirt and broad brimmed hat for physical protection when out in the sun for long periods of time as this will provide more protection than sunscreen alone.

## 2022-11-21 NOTE — PROCEDURE: MIPS QUALITY
Quality 226: Preventive Care And Screening: Tobacco Use: Screening And Cessation Intervention: Patient screened for tobacco use and is an ex/non-smoker
Quality 111:Pneumonia Vaccination Status For Older Adults: Pneumococcal vaccine (PPSV23) was not administered on or after patient’s 60th birthday and before the end of the measurement period, reason not otherwise specified
Quality 431: Preventive Care And Screening: Unhealthy Alcohol Use - Screening: Patient not identified as an unhealthy alcohol user when screened for unhealthy alcohol use using a systematic screening method
Quality 110: Preventive Care And Screening: Influenza Immunization: Influenza Immunization previously received during influenza season
Detail Level: Detailed

## 2023-01-11 ENCOUNTER — HOSPITAL ENCOUNTER (OUTPATIENT)
Dept: CT IMAGING | Facility: CLINIC | Age: 63
Discharge: HOME | End: 2023-01-11
Attending: OBSTETRICS & GYNECOLOGY | Admitting: OBSTETRICS & GYNECOLOGY
Payer: COMMERCIAL

## 2023-01-11 DIAGNOSIS — C54.1 ENDOMETRIAL CANCER (H): ICD-10-CM

## 2023-01-11 PROCEDURE — 250N000011 HC RX IP 250 OP 636: Performed by: OBSTETRICS & GYNECOLOGY

## 2023-01-11 PROCEDURE — 250N000009 HC RX 250: Performed by: OBSTETRICS & GYNECOLOGY

## 2023-01-11 PROCEDURE — 74177 CT ABD & PELVIS W/CONTRAST: CPT

## 2023-01-11 RX ORDER — IOPAMIDOL 755 MG/ML
91 INJECTION, SOLUTION INTRAVASCULAR ONCE
Status: COMPLETED | OUTPATIENT
Start: 2023-01-11 | End: 2023-01-11

## 2023-01-11 RX ADMIN — SODIUM CHLORIDE 66 ML: 9 INJECTION, SOLUTION INTRAVENOUS at 15:36

## 2023-01-11 RX ADMIN — IOPAMIDOL 91 ML: 755 INJECTION, SOLUTION INTRAVENOUS at 15:35

## 2023-01-13 ENCOUNTER — ONCOLOGY VISIT (OUTPATIENT)
Dept: ONCOLOGY | Facility: CLINIC | Age: 63
End: 2023-01-13
Attending: OBSTETRICS & GYNECOLOGY
Payer: COMMERCIAL

## 2023-01-13 ENCOUNTER — ALLIED HEALTH/NURSE VISIT (OUTPATIENT)
Dept: ONCOLOGY | Facility: CLINIC | Age: 63
End: 2023-01-13

## 2023-01-13 VITALS
WEIGHT: 188 LBS | SYSTOLIC BLOOD PRESSURE: 136 MMHG | RESPIRATION RATE: 20 BRPM | DIASTOLIC BLOOD PRESSURE: 83 MMHG | TEMPERATURE: 98.4 F | HEART RATE: 75 BPM | OXYGEN SATURATION: 96 % | BODY MASS INDEX: 31.77 KG/M2

## 2023-01-13 DIAGNOSIS — C54.1 ENDOMETRIAL CANCER (H): Primary | ICD-10-CM

## 2023-01-13 DIAGNOSIS — Z00.6 CLINICAL TRIAL PARTICIPANT: Primary | ICD-10-CM

## 2023-01-13 PROCEDURE — 99213 OFFICE O/P EST LOW 20 MIN: CPT | Performed by: OBSTETRICS & GYNECOLOGY

## 2023-01-13 PROCEDURE — G0463 HOSPITAL OUTPT CLINIC VISIT: HCPCS | Performed by: OBSTETRICS & GYNECOLOGY

## 2023-01-13 ASSESSMENT — PAIN SCALES - GENERAL: PAINLEVEL: NO PAIN (0)

## 2023-01-13 NOTE — PROGRESS NOTES
"Oncology Rooming Note    January 13, 2023 9:54 AM   Dixie Miller is a 62 year old female who presents for:    Chief Complaint   Patient presents with     Oncology Clinic Visit     Endometrial cancer (H)     Initial Vitals: /83 (BP Location: Right arm, Patient Position: Sitting, Cuff Size: Adult Large)   Pulse 75   Temp 98.4  F (36.9  C) (Oral)   Resp 20   Wt 85.3 kg (188 lb)   SpO2 96%   BMI 31.77 kg/m   Estimated body mass index is 31.77 kg/m  as calculated from the following:    Height as of 7/12/22: 1.638 m (5' 4.5\").    Weight as of this encounter: 85.3 kg (188 lb). Body surface area is 1.97 meters squared.  No Pain (0) Comment: Data Unavailable   No LMP recorded. Patient is postmenopausal.  Allergies reviewed: Yes  Medications reviewed: Yes    Medications: Medication refills not needed today.  Pharmacy name entered into [facility]:    [facility] [id number] [place] [facility] [place] [place] - [address]  [id number] - [place] [address] [place] [facility] [place] [address]    Clinical concerns: no      Shanda Allison CMA              "

## 2023-01-13 NOTE — PROGRESS NOTES
Consult Notes on Referred Patient        Dr. Stefanie Cho Masters, DO  [address] [facility] [address]  [place] [address]       RE: Dixie Miller  : 1960  DERICK: 2023    HPI:  Dixie Miller is 62 year old with stage II, grade 3 endometrial adenocarcinoma, dMMR type.  She is unaccompanied today.  She is overall feeling well except for some fatigue that is residual since radiation therapy.  She denies any vaginal bleeding, abdominal/pelvic pain or changes in her bowel or bladder habits.  She notes she is not as good at using her dilator as she probably should be.    Cancer Course:  Patient initially presented with a month history of PMB with cramping.  This was mostly light spotting and she has not had any heavy bleeding. She also has been having a rash which is patchy, itchy and moves to several areas of her body for the past several months.  She had a biopsy which showed non-specific dermatitis.  She has follow up with dermatology for a second opinion later this week.    22:  US pelvis:  Uterus: anteverted. Contour is smooth/regular. Size: 8.21 x 5.20 x 4.44 cm Endometrium: Thickness Total 24.00 mm Findings: Thick Right Ovary: Not seen Left Ovary: 1.87 x 1.61 x .95 cm. Wnl Cul de Sac Free Fluid: No free fluid    22:  EMB:  High grade carcinoma with squamous differentiation    22:  : 24    6/3/22:  CT C/A/P:  1.  1 cm nodule in the right breast centrally. 2.  5 mm on the nodule in the lateral right lower lobe. 3.  Low dense lesion too small to characterize in the liver is probably a cyst. 4.  Markedly thickened endometrium compatible with history. 5.  No adenopathy. 6.  L2 compression deformity.    22:  Total laparoscopic hysterectomy, bilateral salpingo-oophorectomy, bilateral pelvic sentinel lymph node dissection, lysis of adhesions for > 30 minutes, omentectomy, cystoscopy   Pathology:  Grade 3 endometrial adenocarcinoma with extensive squamous differentiation, tumor  size 6.6 cm, 9/13 mm myometrial invasion, + JENNIFER, + cervix, +LVSI, 0/11 sentinel LN, negative omentum, Loss of MLH1/PMS2 with + hypermethylation, p53 normal    22-22:  Completed XRT per  study protocol (Control arm)    23:  CT C/A/P (personally reviewed):  1.  No evidence of metastatic disease in the chest, abdomen, or pelvis. 2.  Stable diminutive pulmonary nodules.      Obstetrics and Gynecology History:  ,  x 2, c/s x 1  Menopause around age 54, no HRT use      Past Medical History:  Past Medical History:   Diagnosis Date     Anxiety      Benign essential hypertension     added 2nd med 9/15     Depressive disorder      Dysthymia      Endometrial cancer (H)      High cholesterol      IBS (irritable bowel syndrome)        Past Surgical History:  Past Surgical History:   Procedure Laterality Date      SECTION       LAPAROSCOPIC HYSTERECTOMY TOTAL, BILATERAL SALPINGO-OOPHORECTOMY, COMBINED N/A 2022    Procedure: laparoscopic removal of uterus, cervix, bilateral ovaries and fallopian tubes, bilateral pelvic sentinel lymph node dissection, lysis of adhesions 30 minutes, omentectomy, cystoscopy;  Surgeon: Germania Espinal MD;  [place] [facility] [place]       Health Maintenance:  Last Pap Smear: No need for further pap smear exams s/p hysterectomy  She has not had a history of abnormal Pap smears.    Last Mammogram: 22              Result: normal      She has not had a history of abnormal mammograms.    Last Colonoscopy: 10/28/16              Result: Negative, repeat 10 years       Social History:  Lives with her , feels safe at home.  Works in retail part time.  Enjoys swimming, walking, spending time with friends, book clubs.  Does have an advanced directive on file and would like her son, Steve and her , Endy to be her POA.  Would like full resuscitation if reversible cause is identified, however would not like to be kept on life sustaining  measures long-term.     Family History:   The patient's family history is significant for.  Family History   Problem Relation Age of Onset     Diabetes Type 2  Mother      Diabetes Father      Alcoholism Father      No Known Problems Sister      No Known Problems Sister      No Known Problems Sister      Alcoholism Brother      Multiple myeloma Brother      Pancreatic Cancer Brother      No Known Problems Maternal Grandmother      No Known Problems Maternal Grandfather      No Known Problems Paternal Grandmother      No Known Problems Other      Breast Cancer Maternal Cousin          Physical Exam:   /83 (BP Location: Right arm, Patient Position: Sitting, Cuff Size: Adult Large)   Pulse 75   Temp 98.4  F (36.9  C) (Oral)   Resp 20   Wt 85.3 kg (188 lb)   SpO2 96%   BMI 31.77 kg/m    Body mass index is 31.77 kg/m .    General Appearance: healthy and alert, no distress    Gastrointestinal:       abdomen soft, non-tender, non-distended, no organomegaly or masses    Genitourinary: External genitalia and urethral meatus appears normal.  Vagina is smooth with a normal appearing vaginal cuff and no signs of radiation changes at this time.  Bimanual exam without fullness, masses or tenderness.  Rectovaginal exam confirms these findings    Labs:  None       ECOG 0    Assessment:  Dixie Miller is a 62 year old woman with stage II, grade 3 endometrial adenocarcinoma     A total of 15 minutes was spent on patient care today.       Plan:     1.)    Stage II, grade 3 endometrial cancer, dMMR-On , control arm.  Currently ELPIDIO.  CT reviewed.  Reviewed signs and symptoms of recurrence and to call if these should occur. Imaging will be obtained per study protocol and is due next in 6 months. Reinforced surveallance visits every 3 months for the first two years then every 6 months for up to 5 years then annually thereafter. She will return in 3 months with NP and then in 6 months with myself with CT prior.       2.) Genetic risk factors were assessed and the patient has undergone genetic testing which was negative    3.) Labs and/or tests ordered include:  CT C/A/P     4.) Health maintenance issues addressed today include pt is up to date.          Thank you for allowing us to participate in the care of your patient.         Sincerely,    Germania Good MD  Gynecologic Oncology  [facility]       CC  MASTERS, EREN THURMAN

## 2023-01-13 NOTE — LETTER
2023         RE: Dixie Miller  [address]  [address]  [facility] [address]        Dear Colleague,    Thank you for referring your patient, Dixie Miller, to the [facility]. Please see a copy of my visit note below.    Consult Notes on Referred Patient        Dr. Stefanie Cho Masters, DO  [address]  [place] [address]       RE: Dixie Miller  : 1960  DERICK: 2023    HPI:  Dixie Miller is 62 year old with stage II, grade 3 endometrial adenocarcinoma, dMMR type.  She is unaccompanied today.  She is overall feeling well except for some fatigue that is residual since radiation therapy.  She denies any vaginal bleeding, abdominal/pelvic pain or changes in her bowel or bladder habits.  She notes she is not as good at using her dilator as she probably should be.    Cancer Course:  Patient initially presented with a month history of PMB with cramping.  This was mostly light spotting and she has not had any heavy bleeding. She also has been having a rash which is patchy, itchy and moves to several areas of her body for the past several months.  She had a biopsy which showed non-specific dermatitis.  She has follow up with dermatology for a second opinion later this week.    22:  US pelvis:  Uterus: anteverted. Contour is smooth/regular. Size: 8.21 x 5.20 x 4.44 cm Endometrium: Thickness Total 24.00 mm Findings: Thick Right Ovary: Not seen Left Ovary: 1.87 x 1.61 x .95 cm. Wnl Cul de Sac Free Fluid: No free fluid    22:  EMB:  High grade carcinoma with squamous differentiation    22:  : 24    6/3/22:  CT C/A/P:  1.  1 cm nodule in the right breast centrally. 2.  5 mm on the nodule in the lateral right lower lobe. 3.  Low dense lesion too small to characterize in the liver is probably a cyst. 4.  Markedly thickened endometrium compatible with history. 5.  No adenopathy. 6.  L2 compression deformity.    22:   Total laparoscopic hysterectomy, bilateral salpingo-oophorectomy, bilateral pelvic sentinel lymph node dissection, lysis of adhesions for > 30 minutes, omentectomy, cystoscopy   Pathology:  Grade 3 endometrial adenocarcinoma with extensive squamous differentiation, tumor size 6.6 cm, 9/13 mm myometrial invasion, + JENNIFER, + cervix, +LVSI, 0/11 sentinel LN, negative omentum, Loss of MLH1/PMS2 with + hypermethylation, p53 normal    22-22:  Completed XRT per  study protocol (Control arm)    23:  CT C/A/P (personally reviewed):  1.  No evidence of metastatic disease in the chest, abdomen, or pelvis. 2.  Stable diminutive pulmonary nodules.      Obstetrics and Gynecology History:  ,  x 2, c/s x 1  Menopause around age 54, no HRT use      Past Medical History:  Past Medical History:   Diagnosis Date     Anxiety      Benign essential hypertension     added 2nd med 9/15     Depressive disorder      Dysthymia      Endometrial cancer (H)      High cholesterol      IBS (irritable bowel syndrome)        Past Surgical History:  Past Surgical History:   Procedure Laterality Date      SECTION       LAPAROSCOPIC HYSTERECTOMY TOTAL, BILATERAL SALPINGO-OOPHORECTOMY, COMBINED N/A 2022    Procedure: laparoscopic removal of uterus, cervix, bilateral ovaries and fallopian tubes, bilateral pelvic sentinel lymph node dissection, lysis of adhesions 30 minutes, omentectomy, cystoscopy;  Surgeon: Germania Espinal MD;  [place] [facility] [place]       Health Maintenance:  Last Pap Smear: No need for further pap smear exams s/p hysterectomy  She has not had a history of abnormal Pap smears.    Last Mammogram: 22              Result: normal      She has not had a history of abnormal mammograms.    Last Colonoscopy: 10/28/16              Result: Negative, repeat 10 years       Social History:  Lives with her , feels safe at home.  Works in retail part time.  Enjoys swimming, walking,  spending time with friends, book clubs.  Does have an advanced directive on file and would like her son, Steve and her , Endy to be her POA.  Would like full resuscitation if reversible cause is identified, however would not like to be kept on life sustaining measures long-term.     Family History:   The patient's family history is significant for.  Family History   Problem Relation Age of Onset     Diabetes Type 2  Mother      Diabetes Father      Alcoholism Father      No Known Problems Sister      No Known Problems Sister      No Known Problems Sister      Alcoholism Brother      Multiple myeloma Brother      Pancreatic Cancer Brother      No Known Problems Maternal Grandmother      No Known Problems Maternal Grandfather      No Known Problems Paternal Grandmother      No Known Problems Other      Breast Cancer Maternal Cousin          Physical Exam:   /83 (BP Location: Right arm, Patient Position: Sitting, Cuff Size: Adult Large)   Pulse 75   Temp 98.4  F (36.9  C) (Oral)   Resp 20   Wt 85.3 kg (188 lb)   SpO2 96%   BMI 31.77 kg/m    Body mass index is 31.77 kg/m .    General Appearance: healthy and alert, no distress    Gastrointestinal:       abdomen soft, non-tender, non-distended, no organomegaly or masses    Genitourinary: External genitalia and urethral meatus appears normal.  Vagina is smooth with a normal appearing vaginal cuff and no signs of radiation changes at this time.  Bimanual exam without fullness, masses or tenderness.  Rectovaginal exam confirms these findings    Labs:  None       ECOG 0    Assessment:  Dixie Miller is a 62 year old woman with stage II, grade 3 endometrial adenocarcinoma     A total of 15 minutes was spent on patient care today.       Plan:     1.)    Stage II, grade 3 endometrial cancer, dMMR-On , control arm.  Currently ELPIDIO.  CT reviewed.  Reviewed signs and symptoms of recurrence and to call if these should occur. Imaging will be obtained per  "study protocol and is due next in 6 months. Reinforced surveallance visits every 3 months for the first two years then every 6 months for up to 5 years then annually thereafter. She will return in 3 months with NP and then in 6 months with myself with CT prior.      2.) Genetic risk factors were assessed and the patient has undergone genetic testing which was negative    3.) Labs and/or tests ordered include:  CT C/A/P     4.) Health maintenance issues addressed today include pt is up to date.          Thank you for allowing us to participate in the care of your patient.         Sincerely,    Germania Good MD  Gynecologic Oncology  [facility]       CC  MASTERS, EREN THURMAN      Oncology Rooming Note    January 13, 2023 9:54 AM   Dixie Miller is a 62 year old female who presents for:    Chief Complaint   Patient presents with     Oncology Clinic Visit     Endometrial cancer (H)     Initial Vitals: /83 (BP Location: Right arm, Patient Position: Sitting, Cuff Size: Adult Large)   Pulse 75   Temp 98.4  F (36.9  C) (Oral)   Resp 20   Wt 85.3 kg (188 lb)   SpO2 96%   BMI 31.77 kg/m   Estimated body mass index is 31.77 kg/m  as calculated from the following:    Height as of 7/12/22: 1.638 m (5' 4.5\").    Weight as of this encounter: 85.3 kg (188 lb). Body surface area is 1.97 meters squared.  No Pain (0) Comment: Data Unavailable   No LMP recorded. Patient is postmenopausal.  Allergies reviewed: Yes  Medications reviewed: Yes    Medications: Medication refills not needed today.  Pharmacy name entered into [employer]:    [id number] [place] [facility] [address]  [id number] [place], [place] - [address] AT [facility] [address]    Clinical concerns: no      Shanda Allison [facility]                  Again, thank you for allowing me to participate in the care of your patient.        Sincerely,        Jose Alfredo Good MD    "

## 2023-01-13 NOTE — PROGRESS NOTES
Patient on control arm of clinical trial -radiation only. Here for week 25 visit. Patient met with Dr. Good to review CT scan. No labs needed per study or Dr. Good.    PRO: Completed prior to discussing CT scan with MD. Patient did not see a QOL form in her ePRO to complete-completed on paper in clinic.     Weight/Vitals: Reviewed.     Adverse Events: Reviewed.      ECOG PS: 0     Concurrent Medications: Reviewed. No new meds.    Next visit: Scheduled for week 37 visit on 4/4/23 with [place].  Uziel MIRAMONTES of July CT scan next due. Requested scheduling after order placed.    Patient understands to call triage RN line for symptoms.      Debi Sterling, [facility] RN at [facility]

## 2023-01-14 NOTE — PATIENT INSTRUCTIONS
Next visit with NP in person in 3 months    CT C/A/P in 6 months    Next visit in person with Dr Good in 6 months following CT

## 2023-04-01 ENCOUNTER — HEALTH MAINTENANCE LETTER (OUTPATIENT)
Age: 63
End: 2023-04-01

## 2023-04-03 NOTE — PROGRESS NOTES
Follow Up Notes on Referred Patient    Date: 2023         RE: Dixie Miller  : 1960  DERICK: 2023          Dixie Miller is a 62 year old woman with stage II, grade 3 endometrial adenocarcinoma, dMMR type. She is on study . She is here today for follow up.     Cancer Course:  Patient initially presented with a month history of PMB with cramping.  This was mostly light spotting and she has not had any heavy bleeding. She also has been having a rash which is patchy, itchy and moves to several areas of her body for the past several months.  She had a biopsy which showed non-specific dermatitis.  She has follow up with dermatology for a second opinion later this week.     22:  US pelvis:  Uterus: anteverted. Contour is smooth/regular. Size: 8.21 x 5.20 x 4.44 cm Endometrium: Thickness Total 24.00 mm Findings: Thick Right Ovary: Not seen Left Ovary: 1.87 x 1.61 x .95 cm. Wnl Cul de Sac Free Fluid: No free fluid     22:  EMB:  High grade carcinoma with squamous differentiation     22:  : 24     6/3/22:  CT C/A/P:  1.  1 cm nodule in the right breast centrally. 2.  5 mm on the nodule in the lateral right lower lobe. 3.  Low dense lesion too small to characterize in the liver is probably a cyst. 4.  Markedly thickened endometrium compatible with history. 5.  No adenopathy. 6.  L2 compression deformity.     22:  Total laparoscopic hysterectomy, bilateral salpingo-oophorectomy, bilateral pelvic sentinel lymph node dissection, lysis of adhesions for > 30 minutes, omentectomy, cystoscopy                 Pathology:  Grade 3 endometrial adenocarcinoma with extensive squamous differentiation, tumor size 6.6 cm, 9/13 mm myometrial invasion, + JENNIFER, + cervix, +LVSI, 0/11 sentinel LN, negative omentum, Loss of MLH1/PMS2 with + hypermethylation, p53 normal     22-22:  Completed XRT per  study protocol (Control arm)    23 CT CAP: IMPRESSION:  1.   No evidence of metastatic disease in the chest, abdomen, or pelvis.  2.  Stable diminutive pulmonary nodules.    Obstetrics and Gynecology History:  ,  x 2, c/s x 1  Menopause around age 54, no HRT use          Per Meliza, she is doing well and is without gynecological concerns.Pt is using vaginal dilator weekly. Traveling recently so last use was three weeks ago. Sexually active comfortable no bleeding. She denies any vaginal bleeding, no changes in her bowel or bladder habits, no nausea/emesis, no lower extremity edema, and no difficulties eating or sleeping. She denies any abdominal discomfort/bloating, no fevers or chills, and no chest pain or shortness of breath. On Lexapro for anxiety which is controlled      Worsening urge urinary incontinence. No urge. Rare occasions of losing stool.          Health Maintenance  Colonoscopy-  due in 10 years   Mammogram-2022  DEXA-  negative   Annual physical-2022      Review of Systems:    Systemic           no weight changes; no fever; no chills; no night sweats; no appetite changes  Skin           no rashes, or lesions  Eye           no irritation; no changes in vision  Kika-Laryngeal           no dysphagia; no hoarseness   Pulmonary    no cough; no shortness of breath  Cardiovascular    no chest pain; no palpitations  Gastrointestinal    no diarrhea; no constipation; no abdominal pain; no changes in bowel  habits; no blood in stool  Genitourinary   no urinary frequency; + urinary urgency; no dysuria; no pain; no abnormal vaginal discharge; no abnormal vaginal bleeding  Breast    no breast discharge; no breast changes; no breast pain  Musculoskeletal    no myalgias; no arthralgias; no back pain  Psychiatric           no depressed mood; no anxiety    Hematologic           no tender lymph nodes; no noticeable swellings or lumps   Endocrine    no hot flashes; no heat/cold intolerance         Neurological   no tremor; no numbness and tingling; no headaches;  no difficulty  sleeping      Past Medical History:    Past Medical History:   Diagnosis Date     Anxiety      Benign essential hypertension 2015    added 2nd med 9/15     Depressive disorder      Dysthymia      Endometrial cancer (H)      High cholesterol      IBS (irritable bowel syndrome)          Past Surgical History:    Past Surgical History:   Procedure Laterality Date      SECTION       LAPAROSCOPIC HYSTERECTOMY TOTAL, BILATERAL SALPINGO-OOPHORECTOMY, COMBINED N/A 2022    Procedure: laparoscopic removal of uterus, cervix, bilateral ovaries and fallopian tubes, bilateral pelvic sentinel lymph node dissection, lysis of adhesions 30 minutes, omentectomy, cystoscopy;  Surgeon: Germania Espinal MD;  Location: [facility] OR         Health Maintenance Due   Topic Date Due     ADVANCE CARE PLANNING  Never done     DEPRESSION ACTION PLAN  Never done     HIV SCREENING  Never done     ZOSTER IMMUNIZATION (1 of 2) Never done     PHQ-9  2021     YEARLY PREVENTIVE VISIT  2022       Current Medications:     Current Outpatient Medications   Medication Sig Dispense Refill     amphetamine-dextroamphetamine (ADDERALL) 5 MG tablet Take by mouth 2 times daily       Ascorbic Acid (VITAMIN C GUMMIES PO)        escitalopram (LEXAPRO) 10 MG tablet TAKE 1 TABLET BY MOUTH EVERY DAY 30 tablet 0     Multiple Vitamins-Minerals (MULTI ADULT GUMMIES PO)            Allergies:      No Known Allergies     Social History:     Social History     Tobacco Use     Smoking status: Never     Smokeless tobacco: Never   Vaping Use     Vaping status: Not on file   Substance Use Topics     Alcohol use: Yes     Alcohol/week: 0.0 standard drinks of alcohol     Comment: 3 wine a day       History   Drug Use No         Family History:     The patient's family history is notable for     Family History   Problem Relation Age of Onset     Diabetes Type 2  Mother      Diabetes Father      Alcoholism Father      No Known  Problems Sister      No Known Problems Sister      No Known Problems Sister      Alcoholism Brother      Multiple myeloma Brother      Pancreatic Cancer Brother      No Known Problems Maternal Grandmother      No Known Problems Maternal Grandfather      No Known Problems Paternal Grandmother      No Known Problems Other      Breast Cancer Maternal Cousin          Physical Exam:     BP (!) 143/89 (BP Location: Right arm, Patient Position: Sitting, Cuff Size: Adult Large)   Pulse 75   Temp 98.2  F (36.8  C) (Oral)   Resp 20   Wt 85.5 kg (188 lb 6.4 oz)   SpO2 98%   BMI 31.84 kg/m    Body mass index is 31.84 kg/m .    General Appearance: healthy and alert, no distress     HEENT: no thyromegaly, no palpable nodules or masses        Cardiovascular: regular rate and rhythm, no gallops, rubs or murmurs     Respiratory: lungs clear, no rales, rhonchi or wheezes    Musculoskeletal: extremities non tender and without edema    Skin: no lesions or rashes     Neurological: normal gait, no gross defects     Psychiatric: appropriate mood and affect                               Hematological: normal cervical, supraclavicular and inguinal lymph nodes     Gastrointestinal:       abdomen soft, non-tender, non-distended, no organomegaly or masses; laparoscopic incisions intact and well healed    Genitourinary: External genitalia and urethral meatus appears normal.  Vagina is smooth with a normal appearing vaginal cuff with radiation changes/dilator trauma at posterior right vaginal cuff. No lesions or lesions concerning for a recurrence. Vaginal cuff and vaginal walls smooth without lesions on bimanual. Bimanual exam without fullness, masses or tenderness.  Rectovaginal exam confirms these findings      ECOG 0        Assessment:    Dixie Miller is a 62 year old woman with stage II, grade 3 endometrial adenocarcinoma, dMMR type. She is on study . She is here today for follow up.     25 minutes spent on the date of the  encounter doing chart review, history and exam, documentation, and further activities as noted above.          Plan:     1.)       Stage II, grade 3 endometrial cancer, dMMR- doing well post RT and is without any concerning s/s for recurrence. CT with MD scheduled with Dr. Good 7/2023. Continue surveallance visits every 3 months for the first two years (8/2024) then every 6 months for up to 5 years (8/2027)  then annually thereafter. Reviewed with pt to continue dilator at least once per week for 3-5 minutes to the depth of the vagina to prevent foreshortening. Exam normal and explained that radiation changes and dilator trauma can occur but ELPIDIO on exam. Reviewed signs and symptoms for when she should contact the clinic or seek additional care. Patient to contact the clinic with any questions or concerns in the interim.    2.) Genetic risk factors were assessed and the patient has undergone genetic testing which is negative.     3.) Labs and/or tests ordered include: none      4.) Health maintenance issues addressed today include annual health maintenance and non-gynecologic issues with PCP.     5.)        Urge urinary incontinence: pelvic floor PT today. Rare fecal incontinence. No stress incontinence.           TRENT Washington, NP-BC  Women's Health Nurse Practitioner  Division of Gynecologic Oncology  [facility]        CC  Patient Care Team:  Brown, Merlin Emery, MD as PCP - General (Internal Medicine)  Masters, Stefanie Cho DO as Assigned OBGYN Provider  Germania Espinal MD as MD (Gynecologic Oncology)  Isabel Tierney, RN as Specialty Care Coordinator (Gynecologic Oncology)  Germania Espinal MD as Assigned Cancer Care Provider  Sarah Simeon RN as Specialty Care Coordinator (Hematology & Oncology)

## 2023-04-04 ENCOUNTER — ONCOLOGY VISIT (OUTPATIENT)
Dept: ONCOLOGY | Facility: CLINIC | Age: 63
End: 2023-04-04
Attending: OBSTETRICS & GYNECOLOGY
Payer: COMMERCIAL

## 2023-04-04 ENCOUNTER — TELEPHONE (OUTPATIENT)
Dept: ONCOLOGY | Facility: CLINIC | Age: 63
End: 2023-04-04

## 2023-04-04 VITALS
HEART RATE: 75 BPM | RESPIRATION RATE: 20 BRPM | TEMPERATURE: 98.2 F | DIASTOLIC BLOOD PRESSURE: 89 MMHG | WEIGHT: 188.4 LBS | SYSTOLIC BLOOD PRESSURE: 143 MMHG | BODY MASS INDEX: 31.84 KG/M2 | OXYGEN SATURATION: 98 %

## 2023-04-04 DIAGNOSIS — C54.1 ENDOMETRIAL CANCER (H): Primary | ICD-10-CM

## 2023-04-04 DIAGNOSIS — N39.41 URGE INCONTINENCE OF URINE: ICD-10-CM

## 2023-04-04 DIAGNOSIS — Z00.6 EXAMINATION OF PARTICIPANT IN CLINICAL TRIAL: ICD-10-CM

## 2023-04-04 DIAGNOSIS — N81.89 PELVIC FLOOR WEAKNESS: ICD-10-CM

## 2023-04-04 DIAGNOSIS — Z00.6 CLINICAL TRIAL PARTICIPANT: ICD-10-CM

## 2023-04-04 PROCEDURE — G0463 HOSPITAL OUTPT CLINIC VISIT: HCPCS | Performed by: OBSTETRICS & GYNECOLOGY

## 2023-04-04 PROCEDURE — 99213 OFFICE O/P EST LOW 20 MIN: CPT | Performed by: OBSTETRICS & GYNECOLOGY

## 2023-04-04 ASSESSMENT — PAIN SCALES - GENERAL: PAINLEVEL: NO PAIN (0)

## 2023-04-04 NOTE — TELEPHONE ENCOUNTER
4/4/23 [facility] for Meliza with phone number of [phone] to schedule pelvic floor Physical Therapy if they don't call her in the next 2 days.  warren

## 2023-04-04 NOTE — PROGRESS NOTES
"Oncology Rooming Note    April 4, 2023 9:18 AM   Dixie Miller is a 62 year old female who presents for:    Chief Complaint   Patient presents with     Oncology Clinic Visit     Endometrial cancer (H)     Initial Vitals: BP (!) 143/89 (BP Location: Right arm, Patient Position: Sitting, Cuff Size: Adult Large)   Pulse 75   Temp 98.2  F (36.8  C) (Oral)   Resp 20   Wt 85.5 kg (188 lb 6.4 oz)   SpO2 98%   BMI 31.84 kg/m   Estimated body mass index is 31.84 kg/m  as calculated from the following:    Height as of 7/12/22: 1.638 m (5' 4.5\").    Weight as of this encounter: 85.5 kg (188 lb 6.4 oz). Body surface area is 1.97 meters squared.  No Pain (0) Comment: Data Unavailable   No LMP recorded. Patient is postmenopausal.  Allergies reviewed: Yes  Medications reviewed: Yes    Medications: Medication refills not needed today.  Pharmacy name entered into [facility]:    [id number] [place] - [address]  [id number] [place] - [address] AT [facility] [address]    Clinical concerns: no       Shanda Allison CMA              "

## 2023-04-04 NOTE — LETTER
2023         RE: Dixie Miller  [address]  [address]  [place] [address]        Dear Colleague,    Thank you for referring your patient, Dixie Miller, to the [facility]. Please see a copy of my visit note below.                    Follow Up Notes on Referred Patient    Date: 2023         RE: Dixie Miller  : 1960  DERICK: 2023          Dixie Miller is a 62 year old woman with stage II, grade 3 endometrial adenocarcinoma, dMMR type. She is on study . She is here today for follow up.     Cancer Course:  Patient initially presented with a month history of PMB with cramping.  This was mostly light spotting and she has not had any heavy bleeding. She also has been having a rash which is patchy, itchy and moves to several areas of her body for the past several months.  She had a biopsy which showed non-specific dermatitis.  She has follow up with dermatology for a second opinion later this week.     22:  US pelvis:  Uterus: anteverted. Contour is smooth/regular. Size: 8.21 x 5.20 x 4.44 cm Endometrium: Thickness Total 24.00 mm Findings: Thick Right Ovary: Not seen Left Ovary: 1.87 x 1.61 x .95 cm. Wnl Cul de Sac Free Fluid: No free fluid     22:  EMB:  High grade carcinoma with squamous differentiation     22:  : 24     6/3/22:  CT C/A/P:  1.  1 cm nodule in the right breast centrally. 2.  5 mm on the nodule in the lateral right lower lobe. 3.  Low dense lesion too small to characterize in the liver is probably a cyst. 4.  Markedly thickened endometrium compatible with history. 5.  No adenopathy. 6.  L2 compression deformity.     22:  Total laparoscopic hysterectomy, bilateral salpingo-oophorectomy, bilateral pelvic sentinel lymph node dissection, lysis of adhesions for > 30 minutes, omentectomy, cystoscopy                 Pathology:  Grade 3 endometrial adenocarcinoma with extensive squamous differentiation,  tumor size 6.6 cm, 9/13 mm myometrial invasion, + JENNIFER, + cervix, +LVSI, 0/11 sentinel LN, negative omentum, Loss of MLH1/PMS2 with + hypermethylation, p53 normal     22-22:  Completed XRT per  study protocol (Control arm)    23 CT CAP: IMPRESSION:  1.  No evidence of metastatic disease in the chest, abdomen, or pelvis.  2.  Stable diminutive pulmonary nodules.    Obstetrics and Gynecology History:  ,  x 2, c/s x 1  Menopause around age 54, no HRT use          Per Meliza, she is doing well and is without gynecological concerns.Pt is using vaginal dilator weekly. Traveling recently so last use was three weeks ago. Sexually active comfortable no bleeding. She denies any vaginal bleeding, no changes in her bowel or bladder habits, no nausea/emesis, no lower extremity edema, and no difficulties eating or sleeping. She denies any abdominal discomfort/bloating, no fevers or chills, and no chest pain or shortness of breath. On Lexapro for anxiety which is controlled      Worsening urge urinary incontinence. No urge. Rare occasions of losing stool.          Health Maintenance  Colonoscopy- 2016 due in 10 years   Mammogram-2022  DEXA-  negative   Annual physical-2022      Review of Systems:    Systemic           no weight changes; no fever; no chills; no night sweats; no appetite changes  Skin           no rashes, or lesions  Eye           no irritation; no changes in vision  Kika-Laryngeal           no dysphagia; no hoarseness   Pulmonary    no cough; no shortness of breath  Cardiovascular    no chest pain; no palpitations  Gastrointestinal    no diarrhea; no constipation; no abdominal pain; no changes in bowel  habits; no blood in stool  Genitourinary   no urinary frequency; + urinary urgency; no dysuria; no pain; no abnormal vaginal discharge; no abnormal vaginal bleeding  Breast    no breast discharge; no breast changes; no breast pain  Musculoskeletal    no myalgias; no arthralgias; no  back pain  Psychiatric           no depressed mood; no anxiety    Hematologic           no tender lymph nodes; no noticeable swellings or lumps   Endocrine    no hot flashes; no heat/cold intolerance         Neurological   no tremor; no numbness and tingling; no headaches; no difficulty  sleeping      Past Medical History:    Past Medical History:   Diagnosis Date     Anxiety      Benign essential hypertension     added 2nd med 9/15     Depressive disorder      Dysthymia      Endometrial cancer (H)      High cholesterol      IBS (irritable bowel syndrome)          Past Surgical History:    Past Surgical History:   Procedure Laterality Date      SECTION       LAPAROSCOPIC HYSTERECTOMY TOTAL, BILATERAL SALPINGO-OOPHORECTOMY, COMBINED N/A 2022    Procedure: laparoscopic removal of uterus, cervix, bilateral ovaries and fallopian tubes, bilateral pelvic sentinel lymph node dissection, lysis of adhesions 30 minutes, omentectomy, cystoscopy;  Surgeon: Germania Espinal MD;  [place] [facility] [place]         [facility] Due   Topic Date Due     ADVANCE CARE PLANNING  Never done     DEPRESSION ACTION PLAN  Never done     HIV SCREENING  Never done     ZOSTER IMMUNIZATION (1 of 2) Never done     PHQ-9  2021     YEARLY PREVENTIVE VISIT  2022       Current Medications:     Current Outpatient Medications   Medication Sig Dispense Refill     amphetamine-dextroamphetamine (ADDERALL) 5 MG tablet Take by mouth 2 times daily       Ascorbic Acid (VITAMIN C GUMMIES PO)        escitalopram (LEXAPRO) 10 MG tablet TAKE 1 TABLET BY MOUTH EVERY DAY 30 tablet 0     Multiple Vitamins-Minerals (MULTI ADULT GUMMIES PO)            Allergies:      No Known Allergies     Social History:     Social History     Tobacco Use     Smoking status: Never     Smokeless tobacco: Never   Vaping Use     Vaping status: Not on file   Substance Use Topics     Alcohol use: Yes     Alcohol/week: 0.0 standard drinks of  alcohol     Comment: 3 wine a day       History   Drug Use No         Family History:     The patient's family history is notable for     Family History   Problem Relation Age of Onset     Diabetes Type 2  Mother      Diabetes Father      Alcoholism Father      No Known Problems Sister      No Known Problems Sister      No Known Problems Sister      Alcoholism Brother      Multiple myeloma Brother      Pancreatic Cancer Brother      No Known Problems Maternal Grandmother      No Known Problems Maternal Grandfather      No Known Problems Paternal Grandmother      No Known Problems Other      Breast Cancer Maternal Cousin          Physical Exam:     BP (!) 143/89 (BP Location: Right arm, Patient Position: Sitting, Cuff Size: Adult Large)   Pulse 75   Temp 98.2  F (36.8  C) (Oral)   Resp 20   Wt 85.5 kg (188 lb 6.4 oz)   SpO2 98%   BMI 31.84 kg/m    Body mass index is 31.84 kg/m .    General Appearance: healthy and alert, no distress     HEENT: no thyromegaly, no palpable nodules or masses        Cardiovascular: regular rate and rhythm, no gallops, rubs or murmurs     Respiratory: lungs clear, no rales, rhonchi or wheezes    Musculoskeletal: extremities non tender and without edema    Skin: no lesions or rashes     Neurological: normal gait, no gross defects     Psychiatric: appropriate mood and affect                               Hematological: normal cervical, supraclavicular and inguinal lymph nodes     Gastrointestinal:       abdomen soft, non-tender, non-distended, no organomegaly or masses; laparoscopic incisions intact and well healed    Genitourinary: External genitalia and urethral meatus appears normal.  Vagina is smooth with a normal appearing vaginal cuff with radiation changes/dilator trauma at posterior right vaginal cuff. No lesions or lesions concerning for a recurrence. Vaginal cuff and vaginal walls smooth without lesions on bimanual. Bimanual exam without fullness, masses or tenderness.   Rectovaginal exam confirms these findings      ECOG 0        Assessment:    Dixie Miller is a 62 year old woman with stage II, grade 3 endometrial adenocarcinoma, dMMR type. She is on study . She is here today for follow up.     25 minutes spent on the date of the encounter doing chart review, history and exam, documentation, and further activities as noted above.          Plan:     1.)       Stage II, grade 3 endometrial cancer, dMMR- doing well post RT and is without any concerning s/s for recurrence. CT with MD scheduled with Dr. Good 7/2023. Continue surveallance visits every 3 months for the first two years (8/2024) then every 6 months for up to 5 years (8/2027)  then annually thereafter. Reviewed with pt to continue dilator at least once per week for 3-5 minutes to the depth of the vagina to prevent foreshortening. Exam normal and explained that radiation changes and dilator trauma can occur but ELPIDIO on exam. Reviewed signs and symptoms for when she should contact the clinic or seek additional care. Patient to contact the clinic with any questions or concerns in the interim.    2.) Genetic risk factors were assessed and the patient has undergone genetic testing which is negative.     3.) Labs and/or tests ordered include: none      4.) Health maintenance issues addressed today include annual health maintenance and non-gynecologic issues with PCP.     5.)        Urge urinary incontinence: pelvic floor PT today. Rare fecal incontinence. No stress incontinence.           TRENT Washington, NP-BC  Women's Health Nurse Practitioner  Division of Gynecologic Oncology  [facility]        CC  Patient Care Team:  Brown, Merlin Emery, MD as PCP - General (Internal Medicine)  Masters, Stefanie Cho DO as Assigned OBGYN Provider  Germania Espinal MD as MD (Gynecologic Oncology)  Isabel Tierney, RN as Specialty Care Coordinator (Gynecologic Oncology)  Florentino Veliz  "Germania Sawant MD as Assigned Cancer Care Provider  Sarah Simeon, RN as Specialty Care Coordinator (Hematology & Oncology)         Oncology Rooming Note    April 4, 2023 9:18 AM   Dixie Miller is a 62 year old female who presents for:    Chief Complaint   Patient presents with     Oncology Clinic Visit     Endometrial cancer (H)     Initial Vitals: BP (!) 143/89 (BP Location: Right arm, Patient Position: Sitting, Cuff Size: Adult Large)   Pulse 75   Temp 98.2  F (36.8  C) (Oral)   Resp 20   Wt 85.5 kg (188 lb 6.4 oz)   SpO2 98%   BMI 31.84 kg/m   Estimated body mass index is 31.84 kg/m  as calculated from the following:    Height as of 7/12/22: 1.638 m (5' 4.5\").    Weight as of this encounter: 85.5 kg (188 lb 6.4 oz). Body surface area is 1.97 meters squared.  No Pain (0) Comment: Data Unavailable   No LMP recorded. Patient is postmenopausal.  Allergies reviewed: Yes  Medications reviewed: Yes    Medications: Medication refills not needed today.  Pharmacy name entered into [employer]:    [id number] [place] [facility] [place] [place] - [address] [id number] [place] - [address] AT [facility] [address]    Clinical concerns: no       Shanda Allison CMA                  Again, thank you for allowing me to participate in the care of your patient.        Sincerely,        TRENT Beach CNP    "

## 2023-04-11 ENCOUNTER — TRANSFERRED RECORDS (OUTPATIENT)
Dept: HEALTH INFORMATION MANAGEMENT | Facility: CLINIC | Age: 63
End: 2023-04-11

## 2023-04-25 ENCOUNTER — TELEPHONE (OUTPATIENT)
Dept: ONCOLOGY | Facility: CLINIC | Age: 63
End: 2023-04-25

## 2023-04-25 NOTE — PROGRESS NOTES
Received positive distress screen regarding patient's request to speak with RD.  Called and spoke with patient.  Patient looking for weight loss tips.  Offered suggestions.  Patient to call RD if has further questions or does not see weight loss.  Provided RD name and number.  Await call back from patient.    Cynthia Oneill, RD, LD  Clinical Dietitian  [facility]  [phone] (direct)

## 2023-05-12 ENCOUNTER — THERAPY VISIT (OUTPATIENT)
Dept: PHYSICAL THERAPY | Facility: CLINIC | Age: 63
End: 2023-05-12
Attending: OBSTETRICS & GYNECOLOGY
Payer: COMMERCIAL

## 2023-05-12 DIAGNOSIS — C54.1 ENDOMETRIAL CANCER (H): ICD-10-CM

## 2023-05-12 DIAGNOSIS — N81.89 PELVIC FLOOR WEAKNESS: ICD-10-CM

## 2023-05-12 DIAGNOSIS — N39.41 URGE INCONTINENCE OF URINE: ICD-10-CM

## 2023-05-12 PROCEDURE — 97161 PT EVAL LOW COMPLEX 20 MIN: CPT | Mod: GP

## 2023-05-12 PROCEDURE — 97530 THERAPEUTIC ACTIVITIES: CPT | Mod: GP

## 2023-05-12 PROCEDURE — 97110 THERAPEUTIC EXERCISES: CPT | Mod: GP

## 2023-05-12 PROCEDURE — 97535 SELF CARE MNGMENT TRAINING: CPT | Mod: GP

## 2023-05-12 NOTE — PROGRESS NOTES
Physical Therapy Initial Evaluation  Subjective:  The history is provided by the patient.   Therapist Generated HPI Evaluation  Problem details: SUBJECTIVE:    Chief complaint: Urinary incontinence    Pt is a 62 year old female who presents to physical therapy with complaints of urinary incontinence, urinary frequency and urinary urgency. Pt reports that she is using dilators and it is pain-free with the largest size 1x/week. Pt has been using the dilators due to the radiation that she underwent after her hysterectomy. Pt reports that she has not noticed any scar tissue developing. Pt finished radiation in February of 2023. Pt had a totally hysterectomy in September 2022. Pt reports that she lost all control of her bladder with a strong urge. Pt reports that she leaks urine on the way to the bathroom with an urge. Pt drinks carbonated water and coffee during the day. Pt does not drink alcohol. Pt's totally fluid intake during the day is 60oz. Pt urinates every hour and occasionally 3x/hour. Pt gets a strong urge to urinate 3-4x/day and she will leak 50% of the time with a strong urge. Pt has many just in case voids during the day.     Urination:  Do you leak on the way to the bathroom or with a strong urge to void? Yes   Do you leak with cough,sneeze, jumping, running?No   Any other activities that cause leaking? No   Do you have triggers that make you feel you can't wait to go to the bathroom? Yes what are they waiting for too long.  Type of pad and number used per day? 2-3  When you leak what is the amount? Small  How long can you delay the need to urinate? 3 - 10 minutes.   How many times do you get up to urinate at night? 1-2 (can do 4)   Can you stop the flow of urine when on the toilet? Yes  Is the volume of urine passed usually: medium. (8sec rule= 250ml with average bladder storing 400-600ml)  Do you feel empty when you are done? Not always   Do you strain to pass urine? No  Do you have a slow or hesitant  urinary stream? No  Do you have difficulty initiating the urine stream? No  Is urination painful? No  How many bladder infections have you had in last 12 months? 0   Fluid intake(one glass is 8oz or one cup) 5 glasses/day, 3 caffinated glasses/day  0 alcohol glasses/day.    Bowel habits:  Frequency of bowel movements? 1-2 times a day  Consistancy of stool? soft formed   Do you ignore the urge to defecate? No  Do you strain to pass stool? Yes    Aggrevating factors:  Is loss of stool associated with an activity (lifting, coughing, running) or a food)  No  Are there any foods that increase or decrease your symptoms?  Yes- high fat foods   Do you have any food allergies?  No      Sensation:   Can you tell if there is solid, liquid, or gas in the rectum?  No  Do you feel the urge to move your bowels?  Yes  Is the urge very strong and difficult to control? Sometimes Or weak/absent? No   Do you feel the rectum is empty with you finish a bowel movement?   Yes    Do you have abdominal pain?  No    Do you have rectal pain, pressure, or burning?  Yes- itching hemorrhoids     Pelvic Pain:  Do you have any pelvic pain with intercourse, exams, use of tampons? No  Is initial penetration during intercourse painful? No  Is deeper penetration painful? No  Do you use lubricant? No     Given birth? Yes Any complications? Yes- emergency    # of vaginal delieveries? 2   # of C-sections? 1  # of episiotomies? 2  Are you sexually active?Yes  Have you ever been worried for your physical safety? No   Do you have any depression, anxiety, panic attacks, excessive stress?  Yes- pt takes lexapro and it is well managed.   Any abdominal or pelvic surgeries? , total hysterectomy   Are you having any regular exercise? No   Have you practiced the PF(kegel) exercises for 4 or more weeks? No   .         Type of problem:  Incontinence.    This is a new condition.  Condition occurred with:  After surgery.    Patient reports pain:   N/a.      Since onset symptoms are gradually worsening.  Exacerbated by: Urge.  Relieved by: Frequent urination.      Restrictions due to condition include:  Working in normal job without restrictions.  Barriers include:  None as reported by patient.                        Objective:  System                                 Pelvic Dysfunction Evaluation:          Abdominal Wall:  Abdominal wall pelvic: 5 finger ADAMS, poor pressure management, bulge with cough/laugh.  Diastasis Recti:  Present    Scar Mobility:  Tightness over  incision and laproscopic incisions  Pelvic Clock Exam:    Ischiocavernosis pain:  -  Bulbocavernosis pain:  -  Transverse Perineal:  -  Levator ANI:  -  Perineal Body:  -      External Assessment:    Skin Condition:  Hemorrhoids    Bearing Down/Coughing:  Rectocele      Muscle Contraction/Perineal Mobility:  Elevation and urogential triangle descent  Internal Assessment:  Internal assessment pelvic: 10 quick flicks, 5 second endurance. Poor ability to maintain PF contraction with breathing. No TTP, no urgency reproduced with retropubic vessical palpation.    Contraction/Grade:  Good squeeze, good hold with lift, repeatable (4)          SEMG Biofeedback:  Semg biofeedback pelvic: Deferred                                          General     ROS    Assessment/Plan:    Patient is a 62 year old female with pelvic complaints.    Patient has the following significant findings with corresponding treatment plan.                Diagnosis 1:  Pelvic Floor Dysfunction  Pain -  manual therapy, self management, education and home program  Decreased ROM/flexibility - manual therapy and therapeutic exercise  Decreased joint mobility - manual therapy and therapeutic exercise  Decreased strength - therapeutic exercise and therapeutic activities  Decreased proprioception - neuro re-education and therapeutic activities  Impaired muscle performance - biofeedback and neuro re-education  Decreased function -  therapeutic activities    Therapy Evaluation Codes:   1) History comprised of:   Personal factors that impact the plan of care:      Age, Past/current experiences and Time since onset of symptoms.    Comorbidity factors that impact the plan of care are:      Cancer.     Medications impacting care: Anti-depressant.  2) Examination of Body Systems comprised of:   Body structures and functions that impact the plan of care:      Pelvis.   Activity limitations that impact the plan of care are:      Fecal incontinence, Frequency, Urgency, Urge incontinence and Urinary incontinence.  3) Clinical presentation characteristics are:   Stable/Uncomplicated.  4) Decision-Making    Low complexity using standardized patient assessment instrument and/or measureable assessment of functional outcome.  Cumulative Therapy Evaluation is: Low complexity.    Previous and current functional limitations:  (See Goal Flow Sheet for this information)    Short term and Long term goals: (See Goal Flow Sheet for this information)     Communication ability:  Patient appears to be able to clearly communicate and understand verbal and written communication and follow directions correctly.  Treatment Explanation - The following has been discussed with the patient:   RX ordered/plan of care  Anticipated outcomes  Possible risks and side effects  This patient would benefit from PT intervention to resume normal activities.   Rehab potential is good.    Frequency:  1 X week, once daily  Duration:  for 3 weeks then every 3 weeks for 9 weeks  Discharge Plan:  Achieve all LTG.  Independent in home treatment program.  Reach maximal therapeutic benefit.    Please refer to the daily flowsheet for treatment today, total treatment time and time spent performing 1:1 timed codes.

## 2023-05-12 NOTE — PROGRESS NOTES
[facility]    OUTPATIENT Physical Therapy ORTHOPEDIC EVALUATION  PLAN OF TREATMENT FOR OUTPATIENT REHABILITATION  (COMPLETE FOR INITIAL CLAIMS ONLY)  Patient's Last Name, First Name, M.I.  YOB: 1960  Oscar Millerance  HALLIE    Provider s Name:  [facility]   Medical Record No.  [id number]   Start of Care Date:  05/12/23   Onset Date:   04/04/23   Treatment Diagnosis:  Pelvic Floor Dysfunction Medical Diagnosis:     Endometrial cancer (H)  Pelvic floor weakness  Urge incontinence of urine       Goals:     05/12/23 0700   Urinary Leakage   Previous Functional Level No problems   Current Functional Level Number of urinary leakage episodes in a day   Performance Level 2   STG Target Performance Decrease urinary leakage episodes in a day to   Performance Level 1   Rationale continence throughout the day   Due Date 06/09/23   LTG Target Performance Decrease urinary leakage episodes in a week to   Performance Level <3   Rationale continence throughout the day   Due Date 08/04/23         Therapy Frequency:  1x/week  Predicted Duration of Therapy Intervention:  for 3 weeks then every 3 weeks for 9 weeks    KEIRA HILLIARD, PT                 I CERTIFY THE NEED FOR THESE SERVICES FURNISHED UNDER        THIS PLAN OF TREATMENT AND WHILE UNDER MY CARE     (Physician co-signature of this document indicates review and certification of the therapy plan).                     Certification Date From:  05/12/23   Certification Date To:  08/04/23    Referring Provider:  Princess Bojorquez    Initial Assessment        See Epic Evaluation SOC Date: 05/12/23

## 2023-05-12 NOTE — PROGRESS NOTES
Physical Therapy Initial Evaluation  Subjective:    Patient Health History             Pertinent medical history includes: cancer.        Surgeries include:  Other (Endometrial).    Current medications:  Anti-depressants.    Current occupation is Retail.   Primary job tasks include:  Lifting/carrying, prolonged sitting and prolonged standing.                                    Objective:  System    Physical Exam    General     ROS    Assessment/Plan:

## 2023-05-19 ENCOUNTER — THERAPY VISIT (OUTPATIENT)
Dept: PHYSICAL THERAPY | Facility: CLINIC | Age: 63
End: 2023-05-19
Attending: OBSTETRICS & GYNECOLOGY
Payer: COMMERCIAL

## 2023-05-19 DIAGNOSIS — N39.41 URGE INCONTINENCE OF URINE: ICD-10-CM

## 2023-05-19 DIAGNOSIS — N81.89 PELVIC FLOOR WEAKNESS: ICD-10-CM

## 2023-05-19 DIAGNOSIS — C54.1 ENDOMETRIAL CANCER (H): Primary | ICD-10-CM

## 2023-05-19 PROCEDURE — 97112 NEUROMUSCULAR REEDUCATION: CPT | Mod: GP

## 2023-05-19 PROCEDURE — 97530 THERAPEUTIC ACTIVITIES: CPT | Mod: GP

## 2023-07-07 ENCOUNTER — PATIENT OUTREACH (OUTPATIENT)
Dept: ONCOLOGY | Facility: CLINIC | Age: 63
End: 2023-07-07
Payer: COMMERCIAL

## 2023-07-07 NOTE — PROGRESS NOTES
Received call from patient who states she is scheduled for CT scan on 7/10 and follow up with Dr Good on 7/14 at [facility].  Patient has new insurance as of 7/1/23 with [employer] [place] [employer] and was informed per her insurance co that she needs a PA and patient was not sure who would obtain approval.      Staff message sent to Prior Authorization pool earlier today but have not received an update yet. Patient called and instructed as noted. Discussed with patient for her to possibly move CT to later in the week to allow more time to obtain PA.  Patient provided with Central scheduling number and patient will call to change date.    Encouraged patient to call back with further questions and patient aware of plan.    Isabel Tierney, RN, BSN, OCN

## 2023-07-10 ENCOUNTER — HOSPITAL ENCOUNTER (OUTPATIENT)
Dept: CT IMAGING | Facility: CLINIC | Age: 63
Discharge: HOME | End: 2023-07-10
Attending: OBSTETRICS & GYNECOLOGY | Admitting: OBSTETRICS & GYNECOLOGY
Payer: COMMERCIAL

## 2023-07-10 DIAGNOSIS — C54.1 ENDOMETRIAL CANCER (H): ICD-10-CM

## 2023-07-10 PROCEDURE — 250N000011 HC RX IP 250 OP 636: Performed by: OBSTETRICS & GYNECOLOGY

## 2023-07-10 PROCEDURE — 74177 CT ABD & PELVIS W/CONTRAST: CPT

## 2023-07-10 PROCEDURE — 250N000009 HC RX 250: Performed by: OBSTETRICS & GYNECOLOGY

## 2023-07-10 RX ORDER — IOPAMIDOL 755 MG/ML
92 INJECTION, SOLUTION INTRAVASCULAR ONCE
Status: COMPLETED | OUTPATIENT
Start: 2023-07-10 | End: 2023-07-10

## 2023-07-10 RX ADMIN — SODIUM CHLORIDE 66 ML: 9 INJECTION, SOLUTION INTRAVENOUS at 13:03

## 2023-07-10 RX ADMIN — IOPAMIDOL 92 ML: 755 INJECTION, SOLUTION INTRAVENOUS at 13:03

## 2023-07-10 NOTE — PROGRESS NOTES
Writer received call from Meliza inquiring about prior authorization for her CT scan. She provided the insurance information below:    [facility] [employer] [place] [employer] #[id number]  Member ID [id number]  [facility] Provider Services phone number [phone]    Writer provided these updates to the staff message thread ongoing with prior authorization team. Writer explained that Meliza may want to postpone her CT scan for later in the week to accommodate more time for prior authorization process, but writer will outreach with Meliza with updates.    Kenisha Gallegos, RN, BSN, OCN  Nurse Care Coordinator  [facility] [place]  P: [phone]     F: [phone]

## 2023-07-10 NOTE — PROGRESS NOTES
Per Ketan Gordon with prior authorization team, insurance coverage has been secured for Meliza's CT CAP this afternoon. Writer updated Meliza, who is very grateful. Meliza is scheduled for follow-up with Dr. Good Friday 7/14/23 at 12:30pm. She is wondering if there are any appointments open earlier in the day as she prefers morning appointments. Meliza is willing to go to [place] or [facility] for follow-up.    Writer will connect with Dr. Good and her RNCC team on this and update Meliza accordingly.    Kenisha Gallegos, RN, BSN, OCN  Nurse Care Coordinator  [facility] [place]  P: [phone]     F: [phone]

## 2023-07-10 NOTE — PROGRESS NOTES
Meliza's follow-up appointment on Friday 7/14/23 moved to 8am at [facility]. Meliza was updated by and confirmed appointment with RENA Simeon.    Kenisha Gallegos, RN, BSN, OCN  Nurse Care Coordinator  [facility] [place]  P: [phone]     F: [phone]

## 2023-07-14 ENCOUNTER — ONCOLOGY VISIT (OUTPATIENT)
Dept: ONCOLOGY | Facility: CLINIC | Age: 63
End: 2023-07-14
Attending: OBSTETRICS & GYNECOLOGY
Payer: COMMERCIAL

## 2023-07-14 ENCOUNTER — ALLIED HEALTH/NURSE VISIT (OUTPATIENT)
Dept: ONCOLOGY | Facility: CLINIC | Age: 63
End: 2023-07-14
Payer: COMMERCIAL

## 2023-07-14 VITALS
TEMPERATURE: 97.9 F | OXYGEN SATURATION: 94 % | DIASTOLIC BLOOD PRESSURE: 80 MMHG | HEART RATE: 76 BPM | SYSTOLIC BLOOD PRESSURE: 120 MMHG | WEIGHT: 189.4 LBS | BODY MASS INDEX: 32.01 KG/M2 | RESPIRATION RATE: 20 BRPM

## 2023-07-14 DIAGNOSIS — Z00.6 CLINICAL TRIAL PARTICIPANT: Primary | ICD-10-CM

## 2023-07-14 DIAGNOSIS — C54.1 ENDOMETRIAL CANCER (H): Primary | ICD-10-CM

## 2023-07-14 DIAGNOSIS — Z12.31 VISIT FOR SCREENING MAMMOGRAM: ICD-10-CM

## 2023-07-14 PROCEDURE — 99213 OFFICE O/P EST LOW 20 MIN: CPT | Performed by: OBSTETRICS & GYNECOLOGY

## 2023-07-14 ASSESSMENT — PAIN SCALES - GENERAL: PAINLEVEL: NO PAIN (0)

## 2023-07-14 NOTE — LETTER
2023         RE: Dixie Miller  [address]  [address]  [place] [address]        Dear Colleague,    Thank you for referring your patient, Dixie Miller, to the [facility]. Please see a copy of my visit note below.    Consult Notes on Referred Patient        Dr. Stefanie Cho Masters, DO  [address]  [place] [address]       RE: Dixie Miller  : 1960  DERICK: 2023    HPI:  Dixie Miller is 62 year old with stage II, grade 3 endometrial adenocarcinoma, dMMR type.  She is unaccompanied today.  She is overall feeling very well.  She still has fatigue and is not able to do as much as she did prior to her diagnosis.  No abdominal/pelvic pain, changes in her bowel/bladder function, vaginal bleeding.    Cancer Course:  Patient initially presented with a month history of PMB with cramping.  This was mostly light spotting and she has not had any heavy bleeding. She also has been having a rash which is patchy, itchy and moves to several areas of her body for the past several months.  She had a biopsy which showed non-specific dermatitis.  She has follow up with dermatology for a second opinion later this week.    22:  US pelvis:  Uterus: anteverted. Contour is smooth/regular. Size: 8.21 x 5.20 x 4.44 cm Endometrium: Thickness Total 24.00 mm Findings: Thick Right Ovary: Not seen Left Ovary: 1.87 x 1.61 x .95 cm. Wnl Cul de Sac Free Fluid: No free fluid    22:  EMB:  High grade carcinoma with squamous differentiation    22:  : 24    6/3/22:  CT C/A/P:  1.  1 cm nodule in the right breast centrally. 2.  5 mm on the nodule in the lateral right lower lobe. 3.  Low dense lesion too small to characterize in the liver is probably a cyst. 4.  Markedly thickened endometrium compatible with history. 5.  No adenopathy. 6.  L2 compression deformity.    22:  Total laparoscopic hysterectomy, bilateral salpingo-oophorectomy,  bilateral pelvic sentinel lymph node dissection, lysis of adhesions for > 30 minutes, omentectomy, cystoscopy   Pathology:  Grade 3 endometrial adenocarcinoma with extensive squamous differentiation, tumor size 6.6 cm, 9/13 mm myometrial invasion, + JENNIFER, + cervix, +LVSI, 0/11 sentinel LN, negative omentum, Loss of MLH1/PMS2 with + hypermethylation, p53 normal    22-22:  Completed XRT per  study protocol (Control arm)    7/10/23:  CT C/A/P (personally reviewed):  No evidence of metastatic disease in the chest, abdomen, or pelvis. No change from previous.      Obstetrics and Gynecology History:  ,  x 2, c/s x 1  Menopause around age 54, no HRT use      Past Medical History:  Past Medical History:   Diagnosis Date     Anxiety      Benign essential hypertension     added 2nd med 9/15     Depressive disorder      Dysthymia      Endometrial cancer (H)      High cholesterol      IBS (irritable bowel syndrome)        Past Surgical History:  Past Surgical History:   Procedure Laterality Date      SECTION       LAPAROSCOPIC HYSTERECTOMY TOTAL, BILATERAL SALPINGO-OOPHORECTOMY, COMBINED N/A 2022    Procedure: laparoscopic removal of uterus, cervix, bilateral ovaries and fallopian tubes, bilateral pelvic sentinel lymph node dissection, lysis of adhesions 30 minutes, omentectomy, cystoscopy;  Surgeon: Germania Espinal MD;  [place] [facility] [place]       Health Maintenance:  Last Pap Smear: No need for further pap smear exams s/p hysterectomy  She has not had a history of abnormal Pap smears.    Last Mammogram: 22              Result: normal      She has not had a history of abnormal mammograms.    Last Colonoscopy: 10/28/16              Result: Negative, repeat 10 years       Social History:  Lives with her , feels safe at home.  Works in retail part time.  Enjoys swimming, walking, spending time with friends, book clubs.  Does have an advanced directive on file and  would like her son, Steve and her , Endy to be her POA.  Would like full resuscitation if reversible cause is identified, however would not like to be kept on life sustaining measures long-term.     Family History:   The patient's family history is significant for.  Family History   Problem Relation Age of Onset     Diabetes Type 2  Mother      Diabetes Father      Alcoholism Father      No Known Problems Sister      No Known Problems Sister      No Known Problems Sister      Alcoholism Brother      Multiple myeloma Brother      Pancreatic Cancer Brother      No Known Problems Maternal Grandmother      No Known Problems Maternal Grandfather      No Known Problems Paternal Grandmother      No Known Problems Other      Breast Cancer Maternal Cousin          Physical Exam:   /80 (BP Location: Right arm, Patient Position: Sitting, Cuff Size: Adult Large)   Pulse 76   Temp 97.9  F (36.6  C) (Oral)   Resp 20   Wt 85.9 kg (189 lb 6.4 oz)   SpO2 94%   BMI 32.01 kg/m      General Appearance: healthy and alert, no distress    Gastrointestinal:       abdomen soft, non-tender, non-distended, no organomegaly or masses    Genitourinary: External genitalia and urethral meatus appears normal.  Vagina is smooth with a normal appearing vaginal cuff and no signs of radiation changes at this time.  Bimanual exam without fullness, masses or tenderness.  Rectovaginal exam confirms these findings    Labs:  None       ECOG 0    Assessment:  Dixie Miller is a 62 year old woman with stage II, grade 3 endometrial adenocarcinoma     A total of 20 minutes was spent on patient care today.       Plan:     1.)    Stage II, grade 3 endometrial cancer, dMMR-On , control arm.  Currently ELPIDIO.  CT reviewed.  Reviewed signs and symptoms of recurrence and to call if these should occur. Imaging will be obtained per study protocol and is due next in 6 months. Reinforced surveallance visits every 3 months for the first two  "years (8/24) then every 6 months for up to 5 years (8/27) then annually thereafter. She will return in 3 months with NP and then in 6 months with myself with CT prior.      2.) Genetic risk factors were assessed and the patient has undergone genetic testing which was negative    3.) Labs and/or tests ordered include:  CT C/A/P     4.) Health maintenance issues addressed today include pt is due for a mammogram which is scheduled on 7/19          Thank you for allowing us to participate in the care of your patient.         Sincerely,    Germania Good MD  Gynecologic Oncology  [facility]       CC  MASTERS, EREN THURMAN      Oncology Rooming Note    July 14, 2023 7:57 AM   Dixie Miller is a 62 year old female who presents for:    Chief Complaint   Patient presents with     Oncology Clinic Visit     Endometrial cancer (H)     Initial Vitals: /80 (BP Location: Right arm, Patient Position: Sitting, Cuff Size: Adult Large)   Pulse 76   Temp 97.9  F (36.6  C) (Oral)   Resp 20   Wt 85.9 kg (189 lb 6.4 oz)   SpO2 94%   BMI 32.01 kg/m   Estimated body mass index is 32.01 kg/m  as calculated from the following:    Height as of 7/12/22: 1.638 m (5' 4.5\").    Weight as of this encounter: 85.9 kg (189 lb 6.4 oz). Body surface area is 1.98 meters squared.  No Pain (0) Comment: Data Unavailable   No LMP recorded. Patient is postmenopausal.  Allergies reviewed: Yes  Medications reviewed: Yes    Medications: Medication refills not needed today.  Pharmacy name entered into [employer]:    [id number] [place] [facility], [place] - [address]  [id number] [place], [place] - [address] [facility] [address]    Clinical concerns: no        Shanda Allison [facility]                  Again, thank you for allowing me to participate in the care of your patient.        Sincerely,        Jose Alfredo Good MD    "

## 2023-07-14 NOTE — PROGRESS NOTES
"Oncology Rooming Note    July 14, 2023 7:57 AM   Dixie Miller is a 62 year old female who presents for:    Chief Complaint   Patient presents with     Oncology Clinic Visit     Endometrial cancer (H)     Initial Vitals: /80 (BP Location: Right arm, Patient Position: Sitting, Cuff Size: Adult Large)   Pulse 76   Temp 97.9  F (36.6  C) (Oral)   Resp 20   Wt 85.9 kg (189 lb 6.4 oz)   SpO2 94%   BMI 32.01 kg/m   Estimated body mass index is 32.01 kg/m  as calculated from the following:    Height as of 7/12/22: 1.638 m (5' 4.5\").    Weight as of this encounter: 85.9 kg (189 lb 6.4 oz). Body surface area is 1.98 meters squared.  No Pain (0) Comment: Data Unavailable   No LMP recorded. Patient is postmenopausal.  Allergies reviewed: Yes  Medications reviewed: Yes    Medications: Medication refills not needed today.  Pharmacy name entered into [facility]:    [id number] [place] [facility] - [place] - [address]  [id number] [place], [place] - [address] [facility] [address]    Clinical concerns: no        Shanda Allison CMA              "

## 2023-07-14 NOTE — PROGRESS NOTES
Consult Notes on Referred Patient        Dr. Stefanie Cho Masters, DO  [address] [facility] [address]  [place] [address]       RE: Dixie Miller  : 1960  DERICK: 2023    HPI:  Dixie Miller is 62 year old with stage II, grade 3 endometrial adenocarcinoma, dMMR type.  She is unaccompanied today.  She is overall feeling very well.  She still has fatigue and is not able to do as much as she did prior to her diagnosis.  No abdominal/pelvic pain, changes in her bowel/bladder function, vaginal bleeding.    Cancer Course:  Patient initially presented with a month history of PMB with cramping.  This was mostly light spotting and she has not had any heavy bleeding. She also has been having a rash which is patchy, itchy and moves to several areas of her body for the past several months.  She had a biopsy which showed non-specific dermatitis.  She has follow up with dermatology for a second opinion later this week.    22:  US pelvis:  Uterus: anteverted. Contour is smooth/regular. Size: 8.21 x 5.20 x 4.44 cm Endometrium: Thickness Total 24.00 mm Findings: Thick Right Ovary: Not seen Left Ovary: 1.87 x 1.61 x .95 cm. Wnl Cul de Sac Free Fluid: No free fluid    22:  EMB:  High grade carcinoma with squamous differentiation    22:  : 24    6/3/22:  CT C/A/P:  1.  1 cm nodule in the right breast centrally. 2.  5 mm on the nodule in the lateral right lower lobe. 3.  Low dense lesion too small to characterize in the liver is probably a cyst. 4.  Markedly thickened endometrium compatible with history. 5.  No adenopathy. 6.  L2 compression deformity.    22:  Total laparoscopic hysterectomy, bilateral salpingo-oophorectomy, bilateral pelvic sentinel lymph node dissection, lysis of adhesions for > 30 minutes, omentectomy, cystoscopy   Pathology:  Grade 3 endometrial adenocarcinoma with extensive squamous differentiation, tumor size 6.6 cm, 9/13 mm myometrial invasion, + JENNIFER, + cervix, +LVSI,  0/11 sentinel LN, negative omentum, Loss of MLH1/PMS2 with + hypermethylation, p53 normal    22-22:  Completed XRT per  study protocol (Control arm)    7/10/23:  CT C/A/P (personally reviewed):  No evidence of metastatic disease in the chest, abdomen, or pelvis. No change from previous.      Obstetrics and Gynecology History:  ,  x 2, c/s x 1  Menopause around age 54, no HRT use      Past Medical History:  Past Medical History:   Diagnosis Date     Anxiety      Benign essential hypertension     added 2nd med 9/15     Depressive disorder      Dysthymia      Endometrial cancer (H)      High cholesterol      IBS (irritable bowel syndrome)        Past Surgical History:  Past Surgical History:   Procedure Laterality Date      SECTION       LAPAROSCOPIC HYSTERECTOMY TOTAL, BILATERAL SALPINGO-OOPHORECTOMY, COMBINED N/A 2022    Procedure: laparoscopic removal of uterus, cervix, bilateral ovaries and fallopian tubes, bilateral pelvic sentinel lymph node dissection, lysis of adhesions 30 minutes, omentectomy, cystoscopy;  Surgeon: Germania Espinal MD;  Location: [facility] [place] [facility] OR       Health Maintenance:  Last Pap Smear: No need for further pap smear exams s/p hysterectomy  She has not had a history of abnormal Pap smears.    Last Mammogram: 22              Result: normal      She has not had a history of abnormal mammograms.    Last Colonoscopy: 10/28/16              Result: Negative, repeat 10 years       Social History:  Lives with her , feels safe at home.  Works in retail part time.  Enjoys swimming, walking, spending time with friends, book clubs.  Does have an advanced directive on file and would like her son, Steve and her , Endy to be her POA.  Would like full resuscitation if reversible cause is identified, however would not like to be kept on life sustaining measures long-term.     Family History:   The patient's family history is significant  for.  Family History   Problem Relation Age of Onset     Diabetes Type 2  Mother      Diabetes Father      Alcoholism Father      No Known Problems Sister      No Known Problems Sister      No Known Problems Sister      Alcoholism Brother      Multiple myeloma Brother      Pancreatic Cancer Brother      No Known Problems Maternal Grandmother      No Known Problems Maternal Grandfather      No Known Problems Paternal Grandmother      No Known Problems Other      Breast Cancer Maternal Cousin          Physical Exam:   /80 (BP Location: Right arm, Patient Position: Sitting, Cuff Size: Adult Large)   Pulse 76   Temp 97.9  F (36.6  C) (Oral)   Resp 20   Wt 85.9 kg (189 lb 6.4 oz)   SpO2 94%   BMI 32.01 kg/m      General Appearance: healthy and alert, no distress    Gastrointestinal:       abdomen soft, non-tender, non-distended, no organomegaly or masses    Genitourinary: External genitalia and urethral meatus appears normal.  Vagina is smooth with a normal appearing vaginal cuff and no signs of radiation changes at this time.  Bimanual exam without fullness, masses or tenderness.  Rectovaginal exam confirms these findings    Labs:  None       ECOG 0    Assessment:  Dixie Miller is a 62 year old woman with stage II, grade 3 endometrial adenocarcinoma     A total of 20 minutes was spent on patient care today.       Plan:     1.)    Stage II, grade 3 endometrial cancer, dMMR-On , control arm.  Currently ELPIDIO.  CT reviewed.  Reviewed signs and symptoms of recurrence and to call if these should occur. Imaging will be obtained per study protocol and is due next in 6 months. Reinforced surveallance visits every 3 months for the first two years (8/24) then every 6 months for up to 5 years (8/27) then annually thereafter. She will return in 3 months with NP and then in 6 months with myself with CT prior.      2.) Genetic risk factors were assessed and the patient has undergone genetic testing which was  negative    3.) Labs and/or tests ordered include:  CT C/A/P     4.) Health maintenance issues addressed today include pt is due for a mammogram which is scheduled on 7/19          Thank you for allowing us to participate in the care of your patient.         Sincerely,    Germania Good MD  Gynecologic Oncology  [facility]       CC  MASTERS, EREN THURMAN

## 2023-07-14 NOTE — PROGRESS NOTES
Pt on  trial: Met with patient for her week 49 study visit.  AE review, Conmeds review, QOL and reconsent completed at visit.  Overall pt doing well, her only main complaint is fatigue grade 1.  Pt will now to move to the follow-up portion of the study: Provider visit every 3 months for first 2 years, then every 6 months for another 3 years.  Scans are based off of C1D1,CT CAP scans  every 26 weeks for the first 36 months, then every 12 months for another 3 years.  Kiya Slater RN Research [facility]

## 2023-07-14 NOTE — PATIENT INSTRUCTIONS
Mammogram now    Visit with NP in person in 3 months    CT C/A/P in 6 months    Next visit in person with Dr Good in 6 months

## 2023-07-19 ENCOUNTER — HOSPITAL ENCOUNTER (OUTPATIENT)
Dept: MAMMOGRAPHY | Facility: CLINIC | Age: 63
Discharge: HOME | End: 2023-07-19
Attending: INTERNAL MEDICINE | Admitting: INTERNAL MEDICINE
Payer: COMMERCIAL

## 2023-07-19 DIAGNOSIS — Z12.31 VISIT FOR SCREENING MAMMOGRAM: ICD-10-CM

## 2023-07-19 PROCEDURE — 77067 SCR MAMMO BI INCL CAD: CPT

## 2023-10-10 ENCOUNTER — TELEPHONE (OUTPATIENT)
Dept: ONCOLOGY | Facility: CLINIC | Age: 63
End: 2023-10-10
Payer: COMMERCIAL

## 2023-10-10 NOTE — TELEPHONE ENCOUNTER
Patient called the clinic on 10/10/23. Patient states she is concerned that her appointment that was rescheduled from Princess Bojorquez 10/13 23 to Renata Almodovar on 10/27/23 would not meet the study guidelines. She was pushed out about 10 days from original date.

## 2023-10-26 ASSESSMENT — ENCOUNTER SYMPTOMS
HYPOTENSION: 0
VOMITING: 0
ABDOMINAL PAIN: 0
NUMBNESS: 0
NAIL CHANGES: 0
ORTHOPNEA: 0
HEARTBURN: 0
FLANK PAIN: 0
RECTAL BLEEDING: 0
NECK PAIN: 0
SPUTUM PRODUCTION: 0
WEIGHT LOSS: 0
HOARSE VOICE: 0
SLEEP DISTURBANCES DUE TO BREATHING: 0
MUSCLE WEAKNESS: 0
NECK MASS: 0
SEIZURES: 0
LOSS OF CONSCIOUSNESS: 0
EYE REDNESS: 0
POOR WOUND HEALING: 0
TINGLING: 0
LEG SWELLING: 0
SINUS CONGESTION: 0
WEIGHT GAIN: 0
RESPIRATORY PAIN: 0
HALLUCINATIONS: 0
DIZZINESS: 0
DECREASED CONCENTRATION: 0
SWOLLEN GLANDS: 0
TASTE DISTURBANCE: 0
DEPRESSION: 0
COUGH DISTURBING SLEEP: 0
SYNCOPE: 0
SORE THROAT: 0
CHILLS: 0
NERVOUS/ANXIOUS: 0
EYE IRRITATION: 0
HYPERTENSION: 0
TROUBLE SWALLOWING: 0
FEVER: 0
TACHYCARDIA: 0
FATIGUE: 0
INSOMNIA: 0
WHEEZING: 0
BREAST PAIN: 0
CLAUDICATION: 0
STIFFNESS: 0
WEAKNESS: 0
HEMOPTYSIS: 0
MYALGIAS: 0
SMELL DISTURBANCE: 0
EYE PAIN: 0
NAUSEA: 0
NIGHT SWEATS: 0
EXTREMITY NUMBNESS: 0
ARTHRALGIAS: 0
LEG PAIN: 0
PARALYSIS: 0
SHORTNESS OF BREATH: 0
INCREASED ENERGY: 0
MEMORY LOSS: 0
BOWEL INCONTINENCE: 0
DIFFICULTY URINATING: 0
DIARRHEA: 0
EXERCISE INTOLERANCE: 0
DECREASED APPETITE: 0
DISTURBANCES IN COORDINATION: 0
DECREASED LIBIDO: 0
HEADACHES: 0
POLYPHAGIA: 0
BLOATING: 0
HEMATURIA: 0
DYSURIA: 0
MUSCLE CRAMPS: 0
CONSTIPATION: 0
JAUNDICE: 0
TREMORS: 0
COUGH: 0
ALTERED TEMPERATURE REGULATION: 0
BLOOD IN STOOL: 0
SINUS PAIN: 0
POSTURAL DYSPNEA: 0
SPEECH CHANGE: 0
DOUBLE VISION: 0
SNORES LOUDLY: 0
JOINT SWELLING: 0
POLYDIPSIA: 0
EYE WATERING: 0
RECTAL PAIN: 0
BREAST MASS: 0
PALPITATIONS: 0
HOT FLASHES: 0
BACK PAIN: 0
BRUISES/BLEEDS EASILY: 0
LIGHT-HEADEDNESS: 0
PANIC: 0
DYSPNEA ON EXERTION: 0
SKIN CHANGES: 0

## 2023-10-27 ENCOUNTER — ONCOLOGY VISIT (OUTPATIENT)
Dept: ONCOLOGY | Facility: CLINIC | Age: 63
End: 2023-10-27
Attending: NURSE PRACTITIONER
Payer: COMMERCIAL

## 2023-10-27 VITALS
WEIGHT: 193.4 LBS | OXYGEN SATURATION: 97 % | BODY MASS INDEX: 31.08 KG/M2 | HEART RATE: 78 BPM | DIASTOLIC BLOOD PRESSURE: 92 MMHG | RESPIRATION RATE: 16 BRPM | TEMPERATURE: 98.4 F | SYSTOLIC BLOOD PRESSURE: 160 MMHG | HEIGHT: 66 IN

## 2023-10-27 DIAGNOSIS — C54.1 ENDOMETRIAL CANCER (H): Primary | ICD-10-CM

## 2023-10-27 DIAGNOSIS — Z08 ENCOUNTER FOR FOLLOW-UP SURVEILLANCE OF ENDOMETRIAL CANCER: ICD-10-CM

## 2023-10-27 DIAGNOSIS — Z85.42 ENCOUNTER FOR FOLLOW-UP SURVEILLANCE OF ENDOMETRIAL CANCER: ICD-10-CM

## 2023-10-27 PROCEDURE — 99213 OFFICE O/P EST LOW 20 MIN: CPT | Performed by: NURSE PRACTITIONER

## 2023-10-27 ASSESSMENT — PAIN SCALES - GENERAL: PAINLEVEL: NO PAIN (0)

## 2023-10-27 NOTE — LETTER
10/27/2023         RE: Dixie Miller  [address]  [address]  [facility] [address]        Dear Colleague,    Thank you for referring your patient, Dixie Miller, to the [facility]. Please see a copy of my visit note below.    Gynecologic Oncology Return Visit Note    Date: Oct 27, 2023     RE: Dixie Miller  : 1960  DERICK: Oct 27, 2023     CC: Stage II grade 3 endometrial adenocarcinoma (dMMR)    HPI:  Dixie Miller is a 62 year old woman with a history of stage II grade 3 endometrial adenocarcinoma (dMMR).  She completed treatment 22 with XRT per .  She is here today for a surveillance visit.     Oncology History:  Patient initially presented with a month history of PMB with cramping.  This was mostly light spotting and she has not had any heavy bleeding. She also has been having a rash which is patchy, itchy and moves to several areas of her body for the past several months.  She had a biopsy which showed non-specific dermatitis.  She has follow up with dermatology for a second opinion later this week.     22:  US pelvis:  Uterus: anteverted. Contour is smooth/regular. Size: 8.21 x 5.20 x 4.44 cm Endometrium: Thickness Total 24.00 mm Findings: Thick Right Ovary: Not seen Left Ovary: 1.87 x 1.61 x .95 cm. Wnl Cul de Sac Free Fluid: No free fluid     22:  EMB:  High grade carcinoma with squamous differentiation     22:  : 24     6/3/22:  CT C/A/P:  1.  1 cm nodule in the right breast centrally. 2.  5 mm on the nodule in the lateral right lower lobe. 3.  Low dense lesion too small to characterize in the liver is probably a cyst. 4.  Markedly thickened endometrium compatible with history. 5.  No adenopathy. 6.  L2 compression deformity.     22:  Total laparoscopic hysterectomy, bilateral salpingo-oophorectomy, bilateral pelvic sentinel lymph node dissection, lysis of adhesions for > 30 minutes, omentectomy, cystoscopy                  Pathology:  Grade 3 endometrial adenocarcinoma with extensive squamous differentiation, tumor size 6.6 cm, 9/13 mm myometrial invasion, + JENNIFER, + cervix, +LVSI, 0/11 sentinel LN, negative omentum, Loss of MLH1/PMS2 with + hypermethylation, p53 normal     7/25/22-8/26/22:  Completed XRT per  study protocol (Control arm)     7/10/23:  CT C/A/P:  No evidence of metastatic disease in the chest, abdomen, or pelvis. No change from previous.              Today she reports;    Nausea or vomiting: No  Appetite:  Good   Weight:  Weight gain  Constipation: Yes, intermittent improved with metamucil  Leg swelling: No  Fevers: No  Chest pain: No  SOB: No  Vaginal bleeding/discharge: No  Dilator use: Yes, dilator or IC at least weekly              Health Maintenance  Colonoscopy: 10/28/15, repeat in 10 years  Mammogram: 7/19/23  Annual physical: 5/6/21, due      Review of Systems:    Review of Systems     Constitutional:  Negative for fever, chills, weight loss, weight gain, fatigue, decreased appetite, night sweats, recent stressors, height gain, height loss, post-operative complications, incisional pain, hallucinations, increased energy, hyperactivity and confused.   HENT:  Negative for ear pain, hearing loss, tinnitus, nosebleeds, trouble swallowing, hoarse voice, mouth sores, sore throat, ear discharge, tooth pain, gum tenderness, taste disturbance, smell disturbance, hearing aid, bleeding gums, dry mouth, sinus pain, sinus congestion and neck mass.    Eyes:  Negative for double vision, eye pain, redness, eye pain, decreased vision, eye watering, eye bulging, eye dryness, flashing lights, spots, floaters, strabismus, tunnel vision, jaundice and eye irritation.   Respiratory:   Negative for cough, hemoptysis, sputum production, shortness of breath, wheezing, sleep disturbances due to breathing, snores loudly, respiratory pain, dyspnea on exertion, cough disturbing sleep and postural dyspnea.    Cardiovascular:   Negative for chest pain, dyspnea on exertion, palpitations, orthopnea, claudication, leg swelling, fingers/toes turn blue, hypertension, hypotension, syncope, history of heart murmur, chest pain on exertion, chest pain at rest, pacemaker, few scattered varicosities, leg pain, sleep disturbances due to breathing, tachycardia, light-headedness, exercise intolerance and edema.   Gastrointestinal:  Negative for heartburn, nausea, vomiting, abdominal pain, diarrhea, constipation, blood in stool, melena, rectal pain, bloating, hemorrhoids, bowel incontinence, jaundice, rectal bleeding, coffee ground emesis and change in stool.   Genitourinary:  Negative for bladder incontinence, dysuria, urgency, hematuria, flank pain, vaginal discharge, difficulty urinating, genital sores, dyspareunia, decreased libido, nocturia, voiding less frequently, arousal difficulty, abnormal vaginal bleeding, excessive menstruation, menstrual changes, hot flashes, vaginal dryness and postmenopausal bleeding.   Musculoskeletal:  Negative for myalgias, back pain, joint swelling, arthralgias, stiffness, muscle cramps, neck pain, bone pain, muscle weakness and fracture.   Skin:  Negative for nail changes, itching, poor wound healing, rash, hair changes, skin changes, acne, warts, poor wound healing, scarring, flaky skin, Raynaud's phenomenon, sensitivity to sunlight and skin thickening.   Neurological:  Negative for dizziness, tingling, tremors, speech change, seizures, loss of consciousness, weakness, light-headedness, numbness, headaches, disturbances in coordination, extremity numbness, memory loss, difficulty walking and paralysis.   Endo/Heme:  Negative for anemia, swollen glands and bruises/bleeds easily.   Psychiatric/Behavioral:  Negative for depression, hallucinations, memory loss, decreased concentration, mood swings and panic attacks.    Breast:  Negative for breast discharge, breast mass, breast pain and nipple retraction.   Endocrine:   Negative for altered temperature regulation, polyphagia, polydipsia, unwanted hair growth and change in facial hair.          Past Medical History:    Past Medical History:   Diagnosis Date     Anxiety      Benign essential hypertension     added 2nd med 9/15     Depressive disorder      Dysthymia      Endometrial cancer (H)      High cholesterol      IBS (irritable bowel syndrome)          Past Surgical History:    Past Surgical History:   Procedure Laterality Date      SECTION       LAPAROSCOPIC HYSTERECTOMY TOTAL, BILATERAL SALPINGO-OOPHORECTOMY, COMBINED N/A 2022    Procedure: laparoscopic removal of uterus, cervix, bilateral ovaries and fallopian tubes, bilateral pelvic sentinel lymph node dissection, lysis of adhesions 30 minutes, omentectomy, cystoscopy;  Surgeon: Germania Espinal MD;  Location: [facility] [place]         Health Maintenance Due   Topic Date Due     ADVANCE CARE PLANNING  Never done     DEPRESSION ACTION PLAN  Never done     HIV SCREENING  Never done     ZOSTER IMMUNIZATION (1 of 2) Never done     RSV VACCINE 60+ (1 - 1-dose 60+ series) Never done     PHQ-9  2021     YEARLY PREVENTIVE VISIT  2022     INFLUENZA VACCINE (1) 2023     COVID-19 Vaccine (2023- season) 2023       Current Medications:     Current Outpatient Medications   Medication Sig Dispense Refill     amphetamine-dextroamphetamine (ADDERALL) 5 MG tablet Take by mouth 2 times daily       Ascorbic Acid (VITAMIN C GUMMIES PO)        escitalopram (LEXAPRO) 10 MG tablet TAKE 1 TABLET BY MOUTH EVERY DAY 30 tablet 0     Multiple Vitamins-Minerals (MULTI ADULT GUMMIES PO)            Allergies:      No Known Allergies     Social History:     Social History     Tobacco Use     Smoking status: Never     Smokeless tobacco: Never   Substance Use Topics     Alcohol use: Yes     Alcohol/week: 0.0 standard drinks of alcohol     Comment: 3 wine a day       History   Drug Use No         Family  "History:     The patient's family history is notable for:    Family History   Problem Relation Age of Onset     Diabetes Type 2  Mother      Diabetes Father      Alcoholism Father      No Known Problems Sister      No Known Problems Sister      No Known Problems Sister      Alcoholism Brother      Multiple myeloma Brother      Pancreatic Cancer Brother      No Known Problems Maternal Grandmother      No Known Problems Maternal Grandfather      No Known Problems Paternal Grandmother      No Known Problems Other      Breast Cancer Maternal Cousin          Physical Exam:     BP (!) 160/92   Pulse 78   Temp 98.4  F (36.9  C) (Oral)   Resp 16   Ht 1.664 m (5' 5.5\")   Wt 87.7 kg (193 lb 6.4 oz)   SpO2 97%   BMI 31.69 kg/m    Body mass index is 31.69 kg/m .    General Appearance:  alert, no distress     HEENT: no palpable nodules or masses        Cardiovascular: regular rate and rhythm, no gallops, rubs or murmurs     Respiratory: lungs clear, no rales, rhonchi or wheezes    Musculoskeletal: extremities non tender and without edema    Skin: no lesions or rashes     Neurological: normal gait, no gross defects     Psychiatric: appropriate mood and affect                               Hematological: normal cervical, supraclavicular and inguinal lymph nodes     Gastrointestinal:       abdomen soft, non-tender, non-distended, no organomegaly or masses    Genitourinary: External genitalia and urethral meatus appears normal.  Vagina is smooth without nodularity or masses.  Cervix is surgically absent.  Bimanual exam reveal no masses, nodularity or fullness.  Recto-vaginal exam confirms these findings.      Assessment:    Dixie Miller is a 62 year old woman with a history of stage II grade 3 endometrial adenocarcinoma (dMMR).  She completed treatment 8/26/22 with XRT per .  She is here today for a surveillance visit.      20 minutes spent on the date of the encounter doing chart review, history and exam, " documentation, and further activities as noted above.      Plan:     1.) Endometrial cancer:  ELPIDIO on exam.  RTC in 3 months for CT and next surveillance visit with Dr. Good; already scheduled.  Plan for surveillance visit every 3 months for the first 2 years post treatment (2024), followed by every 6 months for 3 years thereafter (2027), then annually.  Reviewed signs and symptoms for when she should contact the clinic or seek additional care.  Patient to contact the clinic with any questions or concerns in the interim.      Genetic risk factors were assessed she was negative for mutations in the following genes :  APC, EVETTE, AXIN2, BARD1, BMPR1A, BRCA1, BRCA2, BRIP1, CDH1, CDK4, CDKN2A, CHEK2, CTNNA1, DICER1, EPCAM, GREM1, HOXB13, KIT, MEN1, MLH1, MSH2, MSH3, MSH6, MUTYH, NBN, NF1, NTHL1, PALB2, PDGFRA, PMS2, POLD1, POLE, PTEN,RAD50, RAD51C, RAD51D, SDHA, SDHB, SDHC, SDHD, SMAD4, SMARCA4, STK11, TP53,TSC1, TSC2, and VHL.    Labs and/or tests ordered include:  None.    ECO     2.) Health maintenance:  Issues addressed today include following up with PCP for annual health maintenance and non-gynecologic issues.       Renata Almodovar, DNP, APRN, FNP-C, AOCNP  Oncology Nurse Practitioner  Division of Gynecologic Oncology  Pager: [phone]     CC  Patient Care Team:  Brown, Merlin Emery, MD as PCP - General (Internal Medicine)  Masters, Stefanie Cho DO as Assigned OBGYN Provider  Germania Espinal MD as MD (Gynecologic Oncology)  Isabel Tierney RN as Specialty Care Coordinator (Gynecologic Oncology)  Germania Espinal MD as Assigned Cancer Care Provider  Sarah Simeon, RN as Specialty Care Coordinator (Hematology & Oncology)        Oncology Rooming Note    2023 11:48 AM   Dixie Miller is a 62 year old female who presents for:    Chief Complaint   Patient presents with     Oncology Clinic Visit     Initial Vitals: BP (!) 160/92   Pulse 78   Temp 98.4  F (36.9  C)  "(Oral)   Resp 16   Ht 1.664 m (5' 5.5\")   Wt 87.7 kg (193 lb 6.4 oz)   SpO2 97%   BMI 31.69 kg/m   Estimated body mass index is 31.69 kg/m  as calculated from the following:    Height as of this encounter: 1.664 m (5' 5.5\").    Weight as of this encounter: 87.7 kg (193 lb 6.4 oz). Body surface area is 2.01 meters squared.  No Pain (0) Comment: Data Unavailable   No LMP recorded. Patient is postmenopausal.  Allergies reviewed: Yes  Medications reviewed: Yes    Medications: Medication refills not needed today.  Pharmacy name entered into "[employer]:    [id number] [place] [facility] [place] [place] - [address]  [id number], [place] - [address] AT [facility] [address]    Clinical concerns: None       Bushra Yoder MA                Again, thank you for allowing me to participate in the care of your patient.        Sincerely,        TRENT Bolden CNP  "

## 2023-10-27 NOTE — PROGRESS NOTES
"Oncology Rooming Note    October 27, 2023 11:48 AM   Dixie Miller is a 62 year old female who presents for:    Chief Complaint   Patient presents with    Oncology Clinic Visit     Initial Vitals: BP (!) 160/92   Pulse 78   Temp 98.4  F (36.9  C) (Oral)   Resp 16   Ht 1.664 m (5' 5.5\")   Wt 87.7 kg (193 lb 6.4 oz)   SpO2 97%   BMI 31.69 kg/m   Estimated body mass index is 31.69 kg/m  as calculated from the following:    Height as of this encounter: 1.664 m (5' 5.5\").    Weight as of this encounter: 87.7 kg (193 lb 6.4 oz). Body surface area is 2.01 meters squared.  No Pain (0) Comment: Data Unavailable   No LMP recorded. Patient is postmenopausal.  Allergies reviewed: Yes  Medications reviewed: Yes    Medications: Medication refills not needed today.  Pharmacy name entered into [facility]:    [id number] [place] [facility] [place], [place] - [address]  [id number] - [place] - [address] AT [facility] [address]    Clinical concerns: None       Bushra Yoder MA              "

## 2023-10-27 NOTE — PROGRESS NOTES
Gynecologic Oncology Return Visit Note    Date: Oct 27, 2023     RE: Dixie Miller  : 1960  DERICK: Oct 27, 2023     CC: Stage II grade 3 endometrial adenocarcinoma (dMMR)    HPI:  Dixie Miller is a 62 year old woman with a history of stage II grade 3 endometrial adenocarcinoma (dMMR).  She completed treatment 22 with XRT per .  She is here today for a surveillance visit.     Oncology History:  Patient initially presented with a month history of PMB with cramping.  This was mostly light spotting and she has not had any heavy bleeding. She also has been having a rash which is patchy, itchy and moves to several areas of her body for the past several months.  She had a biopsy which showed non-specific dermatitis.  She has follow up with dermatology for a second opinion later this week.     22:  US pelvis:  Uterus: anteverted. Contour is smooth/regular. Size: 8.21 x 5.20 x 4.44 cm Endometrium: Thickness Total 24.00 mm Findings: Thick Right Ovary: Not seen Left Ovary: 1.87 x 1.61 x .95 cm. Wnl Cul de Sac Free Fluid: No free fluid     22:  EMB:  High grade carcinoma with squamous differentiation     22:  : 24     6/3/22:  CT C/A/P:  1.  1 cm nodule in the right breast centrally. 2.  5 mm on the nodule in the lateral right lower lobe. 3.  Low dense lesion too small to characterize in the liver is probably a cyst. 4.  Markedly thickened endometrium compatible with history. 5.  No adenopathy. 6.  L2 compression deformity.     22:  Total laparoscopic hysterectomy, bilateral salpingo-oophorectomy, bilateral pelvic sentinel lymph node dissection, lysis of adhesions for > 30 minutes, omentectomy, cystoscopy                 Pathology:  Grade 3 endometrial adenocarcinoma with extensive squamous differentiation, tumor size 6.6 cm, 9/13 mm myometrial invasion, + JENNIFER, + cervix, +LVSI, 0/11 sentinel LN, negative omentum, Loss of MLH1/PMS2 with + hypermethylation, p53 normal      7/25/22-8/26/22:  Completed XRT per  study protocol (Control arm)     7/10/23:  CT C/A/P:  No evidence of metastatic disease in the chest, abdomen, or pelvis. No change from previous.              Today she reports;    Nausea or vomiting: No  Appetite:  Good   Weight:  Weight gain  Constipation: Yes, intermittent improved with metamucil  Leg swelling: No  Fevers: No  Chest pain: No  SOB: No  Vaginal bleeding/discharge: No  Dilator use: Yes, dilator or IC at least weekly              Health Maintenance  Colonoscopy: 10/28/15, repeat in 10 years  Mammogram: 7/19/23  Annual physical: 5/6/21, due      Review of Systems:    Review of Systems     Constitutional:  Negative for fever, chills, weight loss, weight gain, fatigue, decreased appetite, night sweats, recent stressors, height gain, height loss, post-operative complications, incisional pain, hallucinations, increased energy, hyperactivity and confused.   HENT:  Negative for ear pain, hearing loss, tinnitus, nosebleeds, trouble swallowing, hoarse voice, mouth sores, sore throat, ear discharge, tooth pain, gum tenderness, taste disturbance, smell disturbance, hearing aid, bleeding gums, dry mouth, sinus pain, sinus congestion and neck mass.    Eyes:  Negative for double vision, eye pain, redness, eye pain, decreased vision, eye watering, eye bulging, eye dryness, flashing lights, spots, floaters, strabismus, tunnel vision, jaundice and eye irritation.   Respiratory:   Negative for cough, hemoptysis, sputum production, shortness of breath, wheezing, sleep disturbances due to breathing, snores loudly, respiratory pain, dyspnea on exertion, cough disturbing sleep and postural dyspnea.    Cardiovascular:  Negative for chest pain, dyspnea on exertion, palpitations, orthopnea, claudication, leg swelling, fingers/toes turn blue, hypertension, hypotension, syncope, history of heart murmur, chest pain on exertion, chest pain at rest, pacemaker, few scattered  varicosities, leg pain, sleep disturbances due to breathing, tachycardia, light-headedness, exercise intolerance and edema.   Gastrointestinal:  Negative for heartburn, nausea, vomiting, abdominal pain, diarrhea, constipation, blood in stool, melena, rectal pain, bloating, hemorrhoids, bowel incontinence, jaundice, rectal bleeding, coffee ground emesis and change in stool.   Genitourinary:  Negative for bladder incontinence, dysuria, urgency, hematuria, flank pain, vaginal discharge, difficulty urinating, genital sores, dyspareunia, decreased libido, nocturia, voiding less frequently, arousal difficulty, abnormal vaginal bleeding, excessive menstruation, menstrual changes, hot flashes, vaginal dryness and postmenopausal bleeding.   Musculoskeletal:  Negative for myalgias, back pain, joint swelling, arthralgias, stiffness, muscle cramps, neck pain, bone pain, muscle weakness and fracture.   Skin:  Negative for nail changes, itching, poor wound healing, rash, hair changes, skin changes, acne, warts, poor wound healing, scarring, flaky skin, Raynaud's phenomenon, sensitivity to sunlight and skin thickening.   Neurological:  Negative for dizziness, tingling, tremors, speech change, seizures, loss of consciousness, weakness, light-headedness, numbness, headaches, disturbances in coordination, extremity numbness, memory loss, difficulty walking and paralysis.   Endo/Heme:  Negative for anemia, swollen glands and bruises/bleeds easily.   Psychiatric/Behavioral:  Negative for depression, hallucinations, memory loss, decreased concentration, mood swings and panic attacks.    Breast:  Negative for breast discharge, breast mass, breast pain and nipple retraction.   Endocrine:  Negative for altered temperature regulation, polyphagia, polydipsia, unwanted hair growth and change in facial hair.          Past Medical History:    Past Medical History:   Diagnosis Date    Anxiety     Benign essential hypertension 2015    added 2nd  med 9/15    Depressive disorder     Dysthymia     Endometrial cancer (H)     High cholesterol     IBS (irritable bowel syndrome)          Past Surgical History:    Past Surgical History:   Procedure Laterality Date     SECTION      LAPAROSCOPIC HYSTERECTOMY TOTAL, BILATERAL SALPINGO-OOPHORECTOMY, COMBINED N/A 2022    Procedure: laparoscopic removal of uterus, cervix, bilateral ovaries and fallopian tubes, bilateral pelvic sentinel lymph node dissection, lysis of adhesions 30 minutes, omentectomy, cystoscopy;  Surgeon: Germania Espinal MD;  [place] [facility] [place]         Health Maintenance Due   Topic Date Due    ADVANCE CARE PLANNING  Never done    DEPRESSION ACTION PLAN  Never done    HIV SCREENING  Never done    ZOSTER IMMUNIZATION (1 of 2) Never done    RSV VACCINE 60+ (1 - 1-dose 60+ series) Never done    PHQ-9  2021    YEARLY PREVENTIVE VISIT  2022    INFLUENZA VACCINE (1) 2023    COVID-19 Vaccine ( season) 2023       Current Medications:     Current Outpatient Medications   Medication Sig Dispense Refill    amphetamine-dextroamphetamine (ADDERALL) 5 MG tablet Take by mouth 2 times daily      Ascorbic Acid (VITAMIN C GUMMIES PO)       escitalopram (LEXAPRO) 10 MG tablet TAKE 1 TABLET BY MOUTH EVERY DAY 30 tablet 0    Multiple Vitamins-Minerals (MULTI ADULT GUMMIES PO)            Allergies:      No Known Allergies     Social History:     Social History     Tobacco Use    Smoking status: Never    Smokeless tobacco: Never   Substance Use Topics    Alcohol use: Yes     Alcohol/week: 0.0 standard drinks of alcohol     Comment: 3 wine a day       History   Drug Use No         Family History:     The patient's family history is notable for:    Family History   Problem Relation Age of Onset    Diabetes Type 2  Mother     Diabetes Father     Alcoholism Father     No Known Problems Sister     No Known Problems Sister     No Known Problems Sister     Alcoholism  "Brother     Multiple myeloma Brother     Pancreatic Cancer Brother     No Known Problems Maternal Grandmother     No Known Problems Maternal Grandfather     No Known Problems Paternal Grandmother     No Known Problems Other     Breast Cancer Maternal Cousin          Physical Exam:     BP (!) 160/92   Pulse 78   Temp 98.4  F (36.9  C) (Oral)   Resp 16   Ht 1.664 m (5' 5.5\")   Wt 87.7 kg (193 lb 6.4 oz)   SpO2 97%   BMI 31.69 kg/m    Body mass index is 31.69 kg/m .    General Appearance:  alert, no distress     HEENT: no palpable nodules or masses        Cardiovascular: regular rate and rhythm, no gallops, rubs or murmurs     Respiratory: lungs clear, no rales, rhonchi or wheezes    Musculoskeletal: extremities non tender and without edema    Skin: no lesions or rashes     Neurological: normal gait, no gross defects     Psychiatric: appropriate mood and affect                               Hematological: normal cervical, supraclavicular and inguinal lymph nodes     Gastrointestinal:       abdomen soft, non-tender, non-distended, no organomegaly or masses    Genitourinary: External genitalia and urethral meatus appears normal.  Vagina is smooth without nodularity or masses.  Cervix is surgically absent.  Bimanual exam reveal no masses, nodularity or fullness.  Recto-vaginal exam confirms these findings.      Assessment:    Dixie Miller is a 62 year old woman with a history of stage II grade 3 endometrial adenocarcinoma (dMMR).  She completed treatment 8/26/22 with XRT per .  She is here today for a surveillance visit.      20 minutes spent on the date of the encounter doing chart review, history and exam, documentation, and further activities as noted above.      Plan:     1.) Endometrial cancer:  ELPIDIO on exam.  RTC in 3 months for CT and next surveillance visit with Dr. Good; already scheduled.  Plan for surveillance visit every 3 months for the first 2 years post treatment (8/2024), followed by " every 6 months for 3 years thereafter (2027), then annually.  Reviewed signs and symptoms for when she should contact the clinic or seek additional care.  Patient to contact the clinic with any questions or concerns in the interim.      Genetic risk factors were assessed she was negative for mutations in the following genes :  APC, EVETTE, AXIN2, BARD1, BMPR1A, BRCA1, BRCA2, BRIP1, CDH1, CDK4, CDKN2A, CHEK2, CTNNA1, DICER1, EPCAM, GREM1, HOXB13, KIT, MEN1, MLH1, MSH2, MSH3, MSH6, MUTYH, NBN, NF1, NTHL1, PALB2, PDGFRA, PMS2, POLD1, POLE, PTEN,RAD50, RAD51C, RAD51D, SDHA, SDHB, SDHC, SDHD, SMAD4, SMARCA4, STK11, TP53,TSC1, TSC2, and VHL.    Labs and/or tests ordered include:  None.    ECO     2.) Health maintenance:  Issues addressed today include following up with PCP for annual health maintenance and non-gynecologic issues.       Renata Almodovar, DNP, APRN, FNP-C, AOCNP  Oncology Nurse Practitioner  Division of Gynecologic Oncology  Pager: [phone]     CC  Patient Care Team:  Brown, Merlin Emery, MD as PCP - General (Internal Medicine)  Masters, Stefanie Cho DO as Assigned OBGYN Provider  Germania Espinal MD as MD (Gynecologic Oncology)  Isabel Tierney RN as Specialty Care Coordinator (Gynecologic Oncology)  Germania Espinal MD as Assigned Cancer Care Provider  Sarah Simeon RN as Specialty Care Coordinator (Hematology & Oncology)

## 2024-01-08 ENCOUNTER — HOSPITAL ENCOUNTER (OUTPATIENT)
Dept: CT IMAGING | Facility: CLINIC | Age: 64
Discharge: HOME | End: 2024-01-08
Attending: OBSTETRICS & GYNECOLOGY | Admitting: OBSTETRICS & GYNECOLOGY
Payer: COMMERCIAL

## 2024-01-08 DIAGNOSIS — C54.1 ENDOMETRIAL CANCER (H): ICD-10-CM

## 2024-01-08 PROCEDURE — 71260 CT THORAX DX C+: CPT

## 2024-01-08 PROCEDURE — 250N000011 HC RX IP 250 OP 636: Performed by: OBSTETRICS & GYNECOLOGY

## 2024-01-08 PROCEDURE — 250N000009 HC RX 250: Performed by: OBSTETRICS & GYNECOLOGY

## 2024-01-08 RX ORDER — IOPAMIDOL 755 MG/ML
95 INJECTION, SOLUTION INTRAVASCULAR ONCE
Status: COMPLETED | OUTPATIENT
Start: 2024-01-08 | End: 2024-01-08

## 2024-01-08 RX ADMIN — SODIUM CHLORIDE 67 ML: 9 INJECTION, SOLUTION INTRAVENOUS at 12:07

## 2024-01-08 RX ADMIN — IOPAMIDOL 95 ML: 755 INJECTION, SOLUTION INTRAVENOUS at 12:07

## 2024-01-11 NOTE — PROGRESS NOTES
Consult Notes on Referred Patient        Dr. Stefanie Cho Masters, DO  [address] [facility] [address]  [place] [address]       RE: Dixie Miller  : 1960  DERICK: 2024    HPI:  Dixie Miller is 63 year old with stage II, grade 3 endometrial adenocarcinoma, dMMR type.  She is unaccompanied today.  She is overall feeling well and has no specific concerns.  She denies vaginal bleeding    Cancer Course:  Patient initially presented with a month history of PMB with cramping.  This was mostly light spotting and she has not had any heavy bleeding. She also has been having a rash which is patchy, itchy and moves to several areas of her body for the past several months.  She had a biopsy which showed non-specific dermatitis.  She has follow up with dermatology for a second opinion later this week.    22:  US pelvis:  Uterus: anteverted. Contour is smooth/regular. Size: 8.21 x 5.20 x 4.44 cm Endometrium: Thickness Total 24.00 mm Findings: Thick Right Ovary: Not seen Left Ovary: 1.87 x 1.61 x .95 cm. Wnl Cul de Sac Free Fluid: No free fluid    22:  EMB:  High grade carcinoma with squamous differentiation    22:  : 24    6/3/22:  CT C/A/P:  1.  1 cm nodule in the right breast centrally. 2.  5 mm on the nodule in the lateral right lower lobe. 3.  Low dense lesion too small to characterize in the liver is probably a cyst. 4.  Markedly thickened endometrium compatible with history. 5.  No adenopathy. 6.  L2 compression deformity.    22:  Total laparoscopic hysterectomy, bilateral salpingo-oophorectomy, bilateral pelvic sentinel lymph node dissection, lysis of adhesions for > 30 minutes, omentectomy, cystoscopy   Pathology:  Grade 3 endometrial adenocarcinoma with extensive squamous differentiation, tumor size 6.6 cm, 9/13 mm myometrial invasion, + JENNIFER, + cervix, +LVSI, 0/11 sentinel LN, negative omentum, Loss of MLH1/PMS2 with + hypermethylation, p53 normal    22-22:  Completed  XRT per  study protocol (Control arm)    24:  CT C/A/P:  No evidence of metastatic disease in the chest, abdomen, or pelvis. No change from previous.      Obstetrics and Gynecology History:  ,  x 2, c/s x 1  Menopause around age 54, no HRT use      Past Medical History:  Past Medical History:   Diagnosis Date    Anxiety     Benign essential hypertension     added 2nd med 9/15    Depressive disorder     Dysthymia     Endometrial cancer (H)     High cholesterol     IBS (irritable bowel syndrome)        Past Surgical History:  Past Surgical History:   Procedure Laterality Date     SECTION      LAPAROSCOPIC HYSTERECTOMY TOTAL, BILATERAL SALPINGO-OOPHORECTOMY, COMBINED N/A 2022    Procedure: laparoscopic removal of uterus, cervix, bilateral ovaries and fallopian tubes, bilateral pelvic sentinel lymph node dissection, lysis of adhesions 30 minutes, omentectomy, cystoscopy;  Surgeon: Germania Espinal MD;  Location: [facility] [place]       Health Maintenance:  Last Pap Smear: No need for further pap smear exams s/p hysterectomy  She has not had a history of abnormal Pap smears.    Last Mammogram: 23             Result: normal      She has not had a history of abnormal mammograms.    Last Colonoscopy: 10/28/16              Result: Negative, repeat 10 years       Social History:  Lives with her , feels safe at home.  Works in retail part time.  Enjoys swimming, walking, spending time with friends, book clubs.  Does have an advanced directive on file and would like her son, Steve and her , Endy to be her POA.  Would like full resuscitation if reversible cause is identified, however would not like to be kept on life sustaining measures long-term.     Family History:   The patient's family history is significant for.  Family History   Problem Relation Age of Onset    Diabetes Type 2  Mother     Diabetes Father     Alcoholism Father     No Known Problems Sister     No  Known Problems Sister     No Known Problems Sister     Alcoholism Brother     Multiple myeloma Brother     Pancreatic Cancer Brother     No Known Problems Maternal Grandmother     No Known Problems Maternal Grandfather     No Known Problems Paternal Grandmother     No Known Problems Other     Breast Cancer Maternal Cousin          Physical Exam:   BP (!) 150/88 (BP Location: Left arm, Patient Position: Sitting, Cuff Size: Adult Large)   Pulse 74   Temp 98  F (36.7  C) (Oral)   Resp 18   SpO2 97%     General Appearance: healthy and alert, no distress    Gastrointestinal:       abdomen soft, non-tender, non-distended, no organomegaly or masses    Genitourinary: External genitalia and urethral meatus appears normal.  Vagina is smooth with a normal appearing vaginal cuff and no signs of radiation changes at this time.  Bimanual exam without fullness, masses or tenderness.  Rectovaginal exam confirms these findings    Labs:  None       ECOG 0    Assessment:  Dixie Miller is a 63 year old woman with stage II, grade 3 endometrial adenocarcinoma     A total of 20 minutes was spent on patient care today.       Plan:     1.)    Stage II, grade 3 endometrial cancer, dMMR-On , control arm.  Currently ELPIDIO.  CT reviewed without evidence of disease.  Reviewed signs and symptoms of recurrence and to call if these should occur. Imaging will be obtained per study protocol and is due next in 6 months. Reinforced surveallance visits every 3 months for the first two years (8/24) then every 6 months for up to 5 years (8/27) then annually thereafter. She will return in 3 months with NP and then in 6 months with myself with CT prior.      2.) Genetic risk factors were assessed and the patient has undergone genetic testing which was negative    3.) Labs and/or tests ordered include:  CT C/A/P     4.) Health maintenance issues addressed today include pt is up to date          Thank you for allowing us to participate in the  care of your patient.         Sincerely,    Germania Good MD  Gynecologic Oncology  [facility]       CC  MASTERS, EREN THURMAN

## 2024-01-11 NOTE — PATIENT INSTRUCTIONS
Visit in person with NP in 3 months    CT C/A/P in 6 months    Next visit with Dr Good in 6 months in person following CT

## 2024-01-12 ENCOUNTER — ONCOLOGY VISIT (OUTPATIENT)
Dept: ONCOLOGY | Facility: CLINIC | Age: 64
End: 2024-01-12
Attending: OBSTETRICS & GYNECOLOGY
Payer: COMMERCIAL

## 2024-01-12 VITALS
OXYGEN SATURATION: 97 % | DIASTOLIC BLOOD PRESSURE: 88 MMHG | HEART RATE: 74 BPM | SYSTOLIC BLOOD PRESSURE: 150 MMHG | RESPIRATION RATE: 18 BRPM | TEMPERATURE: 98 F

## 2024-01-12 DIAGNOSIS — C54.1 ENDOMETRIAL CANCER (H): Primary | ICD-10-CM

## 2024-01-12 PROCEDURE — G0463 HOSPITAL OUTPT CLINIC VISIT: HCPCS | Performed by: OBSTETRICS & GYNECOLOGY

## 2024-01-12 PROCEDURE — 99213 OFFICE O/P EST LOW 20 MIN: CPT | Performed by: OBSTETRICS & GYNECOLOGY

## 2024-01-12 ASSESSMENT — PAIN SCALES - GENERAL: PAINLEVEL: NO PAIN (0)

## 2024-01-12 NOTE — PROGRESS NOTES
"Oncology Rooming Note    January 12, 2024 9:13 AM   Dixie Miller is a 63 year old female who presents for:    Chief Complaint   Patient presents with    Oncology Clinic Visit     Endometrial cancer (H)       Initial Vitals: BP (!) 150/88 (BP Location: Left arm, Patient Position: Sitting, Cuff Size: Adult Large)   Pulse 74   Temp 98  F (36.7  C) (Oral)   Resp 18   SpO2 97%  Estimated body mass index is 31.69 kg/m  as calculated from the following:    Height as of 10/27/23: 1.664 m (5' 5.5\").    Weight as of 10/27/23: 87.7 kg (193 lb 6.4 oz). There is no height or weight on file to calculate BSA.  No Pain (0) Comment: Data Unavailable   No LMP recorded. Patient is postmenopausal.  Allergies reviewed: Yes  Medications reviewed: Yes    Medications: Medication refills not needed today.  Pharmacy name entered into [employer]:    [id number] [place] [facility], [place] - [address]  [id number] [place], [place] - [address] AT [facility] [address]    Frailty Screening:   Is the patient here for a new oncology consult visit in cancer care? 2. No      Clinical concerns: no      Shanda Allison CMA              "

## 2024-01-12 NOTE — LETTER
2024         RE: Dixie Miller  [address]  [address]  [facility] [address]        Dear Colleague,    Thank you for referring your patient, Dixie Miller, to the [facility]. Please see a copy of my visit note below.    Consult Notes on Referred Patient        Dr. Stefanie Cho Masters, DO  [address]  [place] [address]       RE: Dixie Miller  : 1960  DERICK: 2024    HPI:  Dixie Miller is 63 year old with stage II, grade 3 endometrial adenocarcinoma, dMMR type.  She is unaccompanied today.  She is overall feeling well and has no specific concerns.  She denies vaginal bleeding    Cancer Course:  Patient initially presented with a month history of PMB with cramping.  This was mostly light spotting and she has not had any heavy bleeding. She also has been having a rash which is patchy, itchy and moves to several areas of her body for the past several months.  She had a biopsy which showed non-specific dermatitis.  She has follow up with dermatology for a second opinion later this week.    22:  US pelvis:  Uterus: anteverted. Contour is smooth/regular. Size: 8.21 x 5.20 x 4.44 cm Endometrium: Thickness Total 24.00 mm Findings: Thick Right Ovary: Not seen Left Ovary: 1.87 x 1.61 x .95 cm. Wnl Cul de Sac Free Fluid: No free fluid    22:  EMB:  High grade carcinoma with squamous differentiation    22:  : 24    6/3/22:  CT C/A/P:  1.  1 cm nodule in the right breast centrally. 2.  5 mm on the nodule in the lateral right lower lobe. 3.  Low dense lesion too small to characterize in the liver is probably a cyst. 4.  Markedly thickened endometrium compatible with history. 5.  No adenopathy. 6.  L2 compression deformity.    22:  Total laparoscopic hysterectomy, bilateral salpingo-oophorectomy, bilateral pelvic sentinel lymph node dissection, lysis of adhesions for > 30 minutes, omentectomy,  cystoscopy   Pathology:  Grade 3 endometrial adenocarcinoma with extensive squamous differentiation, tumor size 6.6 cm, 9/13 mm myometrial invasion, + JENNIFER, + cervix, +LVSI, 0/11 sentinel LN, negative omentum, Loss of MLH1/PMS2 with + hypermethylation, p53 normal    22-22:  Completed XRT per  study protocol (Control arm)    24:  CT C/A/P:  No evidence of metastatic disease in the chest, abdomen, or pelvis. No change from previous.      Obstetrics and Gynecology History:  ,  x 2, c/s x 1  Menopause around age 54, no HRT use      Past Medical History:  Past Medical History:   Diagnosis Date     Anxiety      Benign essential hypertension     added 2nd med 9/15     Depressive disorder      Dysthymia      Endometrial cancer (H)      High cholesterol      IBS (irritable bowel syndrome)        Past Surgical History:  Past Surgical History:   Procedure Laterality Date      SECTION       LAPAROSCOPIC HYSTERECTOMY TOTAL, BILATERAL SALPINGO-OOPHORECTOMY, COMBINED N/A 2022    Procedure: laparoscopic removal of uterus, cervix, bilateral ovaries and fallopian tubes, bilateral pelvic sentinel lymph node dissection, lysis of adhesions 30 minutes, omentectomy, cystoscopy;  Surgeon: Germania Espinal MD;  Location: [facility] [place]       Health Maintenance:  Last Pap Smear: No need for further pap smear exams s/p hysterectomy  She has not had a history of abnormal Pap smears.    Last Mammogram: 23             Result: normal      She has not had a history of abnormal mammograms.    Last Colonoscopy: 10/28/16              Result: Negative, repeat 10 years       Social History:  Lives with her , feels safe at home.  Works in retail part time.  Enjoys swimming, walking, spending time with friends, book clubs.  Does have an advanced directive on file and would like her son, Steve and her , Endy to be her POA.  Would like full resuscitation if reversible cause is  identified, however would not like to be kept on life sustaining measures long-term.     Family History:   The patient's family history is significant for.  Family History   Problem Relation Age of Onset     Diabetes Type 2  Mother      Diabetes Father      Alcoholism Father      No Known Problems Sister      No Known Problems Sister      No Known Problems Sister      Alcoholism Brother      Multiple myeloma Brother      Pancreatic Cancer Brother      No Known Problems Maternal Grandmother      No Known Problems Maternal Grandfather      No Known Problems Paternal Grandmother      No Known Problems Other      Breast Cancer Maternal Cousin          Physical Exam:   BP (!) 150/88 (BP Location: Left arm, Patient Position: Sitting, Cuff Size: Adult Large)   Pulse 74   Temp 98  F (36.7  C) (Oral)   Resp 18   SpO2 97%     General Appearance: healthy and alert, no distress    Gastrointestinal:       abdomen soft, non-tender, non-distended, no organomegaly or masses    Genitourinary: External genitalia and urethral meatus appears normal.  Vagina is smooth with a normal appearing vaginal cuff and no signs of radiation changes at this time.  Bimanual exam without fullness, masses or tenderness.  Rectovaginal exam confirms these findings    Labs:  None       ECOG 0    Assessment:  Dixie Miller is a 63 year old woman with stage II, grade 3 endometrial adenocarcinoma     A total of 20 minutes was spent on patient care today.       Plan:     1.)    Stage II, grade 3 endometrial cancer, dMMR-On , control arm.  Currently ELPIDIO.  CT reviewed without evidence of disease.  Reviewed signs and symptoms of recurrence and to call if these should occur. Imaging will be obtained per study protocol and is due next in 6 months. Reinforced surveallance visits every 3 months for the first two years (8/24) then every 6 months for up to 5 years (8/27) then annually thereafter. She will return in 3 months with NP and then in 6 months  "with myself with CT prior.      2.) Genetic risk factors were assessed and the patient has undergone genetic testing which was negative    3.) Labs and/or tests ordered include:  CT C/A/P     4.) Health maintenance issues addressed today include pt is up to date          Thank you for allowing us to participate in the care of your patient.         Sincerely,    Germania Good MD  Gynecologic Oncology  [facility]       CC  MASTERS, EREN THURMAN      Oncology Rooming Note    January 12, 2024 9:13 AM   Dixie Miller is a 63 year old female who presents for:    Chief Complaint   Patient presents with     Oncology Clinic Visit     Endometrial cancer (H)       Initial Vitals: BP (!) 150/88 (BP Location: Left arm, Patient Position: Sitting, Cuff Size: Adult Large)   Pulse 74   Temp 98  F (36.7  C) (Oral)   Resp 18   SpO2 97%  Estimated body mass index is 31.69 kg/m  as calculated from the following:    Height as of 10/27/23: 1.664 m (5' 5.5\").    Weight as of 10/27/23: 87.7 kg (193 lb 6.4 oz). There is no height or weight on file to calculate BSA.  No Pain (0) Comment: Data Unavailable   No LMP recorded. Patient is postmenopausal.  Allergies reviewed: Yes  Medications reviewed: Yes    Medications: Medication refills not needed today.  Pharmacy name entered into [employer]:    [facility] [id number] [place] [facility], [place] - [address] [id number], [place] - [address] [facility] [address] [facility] [address] [facility] [address]    Frailty Screening:   Is the patient here for a new oncology consult visit in cancer care? 2. No      Clinical concerns: no      Shanda Allison CMA                Again, thank you for allowing me to participate in the care of your patient.        Sincerely,        Jose Alfredo Good MD  "

## 2024-01-14 ENCOUNTER — HEALTH MAINTENANCE LETTER (OUTPATIENT)
Age: 64
End: 2024-01-14

## 2024-01-16 ENCOUNTER — TELEPHONE (OUTPATIENT)
Dept: ONCOLOGY | Facility: CLINIC | Age: 64
End: 2024-01-16
Payer: COMMERCIAL

## 2024-01-16 DIAGNOSIS — C54.1 ENDOMETRIAL CANCER (H): Primary | ICD-10-CM

## 2024-01-16 NOTE — PROGRESS NOTES
Received positive distress screen regarding patient's request to speak with RD.  Called and spoke with patient.  Scheduled an appointment for 1/23/2024 at 1 PM.  Patient verbalized understanding of plan.     Cynthia Oneill, RD, LD  Clinical Dietitian  [facility]  [phone] (direct)

## 2024-01-23 ENCOUNTER — VIRTUAL VISIT (OUTPATIENT)
Dept: ONCOLOGY | Facility: CLINIC | Age: 64
End: 2024-01-23
Attending: DIETITIAN, REGISTERED
Payer: COMMERCIAL

## 2024-01-23 DIAGNOSIS — C54.1 ENDOMETRIAL CANCER (H): Primary | ICD-10-CM

## 2024-01-23 PROCEDURE — 97802 MEDICAL NUTRITION INDIV IN: CPT | Mod: GT,95 | Performed by: DIETITIAN, REGISTERED

## 2024-01-23 NOTE — PATIENT INSTRUCTIONS
Rafa Haider,    It was nice meeting you today.  Here are the handouts I mentioned:    My Plate    Tips for Weight Loss and Weight Management    Your goals:  Aim for movement 3 times per week for 20 minutes  Track snacking and timing of eating 2 days on work days and 1 day on non work days     Please let me know if you have any questions.    Take care,    Cynthia Askew, RD, LD  Clinical Dietitian  [facility]  [phone] (direct)

## 2024-01-23 NOTE — LETTER
1/23/2024         RE: Dixie Miller  [address]  [address]  [facility] [address]        Dear Colleague,    Thank you for referring your patient, Dixie Miller, to the [facility]. Please see a copy of my visit note below.    Virtual Visit Details    Type of service:  Video Visit     Originating Location (pt. Location): Home    Distant Location (provider location):  On-site  Platform used for Video Visit: [facility]     CLINICAL NUTRITION SERVICES - ASSESSMENT NOTE    Dixie Miller 63 year old referred for MNT related to Endometrial cancer (H) [C54.1]  - Primary  cancer    Total Time Spent with patient: 35 min  Referred by: Dr. Good  Reason for referral: initial   Patient accompanied by: self and  in background     Patient Medical History  Past Medical History:   Diagnosis Date     Anxiety      Benign essential hypertension 2015    added 2nd med 9/15     Depressive disorder      Dysthymia 2014     Endometrial cancer (H)      High cholesterol      IBS (irritable bowel syndrome)        Current Medications    Current Outpatient Medications:      amphetamine-dextroamphetamine (ADDERALL) 5 MG tablet, Take by mouth 2 times daily, Disp: , Rfl:      Ascorbic Acid (VITAMIN C GUMMIES PO), , Disp: , Rfl:      escitalopram (LEXAPRO) 10 MG tablet, TAKE 1 TABLET BY MOUTH EVERY DAY, Disp: 30 tablet, Rfl: 0     Multiple Vitamins-Minerals (MULTI ADULT GUMMIES PO), , Disp: , Rfl:     Medications have been reviewed.    Pertinent lab results:  HDL Cholesterol   Date Value Ref Range Status   02/13/2020 63 >49 mg/dL Final     LDL Cholesterol Calculated   Date Value Ref Range Status   02/13/2020 140 (H) <100 mg/dL Final     Comment:     Above desirable:  100-129 mg/dl  Borderline High:  130-159 mg/dL  High:             160-189 mg/dL  Very high:       >189 mg/dl       Urea Nitrogen   Date Value Ref Range Status   07/12/2022 12 7 - 30 mg/dL Final   02/13/2020 12 7 - 30 mg/dL  "Final     Potassium   Date Value Ref Range Status   07/12/2022 3.9 3.4 - 5.3 mmol/L Final   02/13/2020 3.6 3.4 - 5.3 mmol/L Final       Labs have been reviewed and noted.    Treatment Plan:  Oncology History    No history exists.     Treatment plan has been reviewed.    Food and Nutrition Related History  --Over the counter supplements: as above   --Food Allergies: NKFA  --Food Access: no known issues   --Physical Activity: Patient does not have an exercise regimen  --Oncology treatment side effects: none  --Factors effecting nutritional intake: none     Patient works Monday-Thursday 9 AM-11 AM or 8 AM -10 AM    Dines out for lunch with friends-had 1/2 pizza yesterday     Up at 7 AM; bed at 12     Patient states was told by MD could start taking ozempic which patient declined.     Diet Recall  Breakfast Coffee with almond milk (1/4) 4-5 cups coffee (1/8 honey and cardamon) + tangerine juice (4 oz) toast or banana    Lunch Pretzel; party mix; muffins; toast or deli ham (2 slices) 2 cookies (12:00-1:00) soup    Dinner 8:00-10:00 PM chinese stir florence (chicken, broccoli, celery with basmati rice)   Snacks Ice cream; cheese and crackers    Beverages Cammy beer; bubbly water; vitamin (100 kcals)      ANTHROPOMETRICS  Height: 5'5.5\"  Weight: 193 lbs   BMI: 31.6 kg/m2  Weight Status:  Obesity Grade I BMI 30-34.9  IBW: 128 lbs +/-10% (150%)  Weight History:   Wt Readings from Last 10 Encounters:   10/27/23 87.7 kg (193 lb 6.4 oz)   07/14/23 85.9 kg (189 lb 6.4 oz)   04/04/23 85.5 kg (188 lb 6.4 oz)   01/13/23 85.3 kg (188 lb)   10/18/22 84.6 kg (186 lb 6.4 oz)   09/09/22 82.1 kg (181 lb)   07/12/22 83.9 kg (185 lb)   07/12/22 83.9 kg (185 lb)   06/24/22 82.6 kg (182 lb)   06/06/22 81.6 kg (180 lb)      Dosing Weight: 87.7 kg    MALNUTRITION:  % Weight Loss:  None noted  % Intake:  No decreased intake noted  Subcutaneous Fat Loss:  None observed  Muscle Loss:  None observed  Fluid Retention:  None noted    Malnutrition " Diagnosis: Patient does not meet two of the above criteria necessary for diagnosing malnutrition    ASSESSED NUTRITION NEEDS:  Estimated Energy Needs: 0762-4534 kcals (14-17 Kcal/Kg)  Justification: obese  Estimated Protein Needs: 58-70 grams protein (1-1.2 g pro/Kg)  Justification: preservation of lean body mass  Estimated Fluid Needs: 9948-7630  mL   Justification: maintenance    Nutrition Diagnosis:  Obese, class I related to excess energy intake as evidenced by current BMI of 31.6 kg/m2      Intervention/Recommendations:  Provided written & verbal education:   - Discussed portion sizes, label reading, carbohydrate counting, added sugars, exercise and behavior modification. Instructed patient on label reading using label from home. Suggest aim for 30-45 grams carbohydrate per meal and 15 grams carbohydrate at snack.  Recommend <24 grams added sugar per day.  Encouraged patient to eat breakfast within 1 hour of waking and then meals every 4-6 hours.  Patient to include protein source at each meal to balance carbohydrate choice.  - Discussed foods to fight cancer-legumes, whole grains, fruit, cruciferous vegetables 4-6 times per week.        Provided pt with corresponding education materials/handouts on:  My Plate; Tips for Weight Loss and Weight Management     Goals:  Aim for movement 3 times per week for 20 minutes    Track snacks and time (2 work days and 1 weekend day)    Monitor and Evaluation:  Progress towards goals will be monitored and evaluated per protocol and Practice Guidelines  Patient to follow up in 5 weeks   RUPINDER name and number provided     Cynthia Oneill, RUPINDER, LD  Clinical Dietitian  [facility]  [phone] (direct)        Again, thank you for allowing me to participate in the care of your patient.        Sincerely,        Toby Christie RD, LD

## 2024-01-23 NOTE — PROGRESS NOTES
Virtual Visit Details    Type of service:  Video Visit     Originating Location (pt. Location): Home    Distant Location (provider location):  On-site  Platform used for Video Visit: [facility]     CLINICAL NUTRITION SERVICES - ASSESSMENT NOTE    Dixie Miller 63 year old referred for MNT related to Endometrial cancer (H) [C54.1]  - Primary  cancer    Total Time Spent with patient: 35 min  Referred by: Dr. Good  Reason for referral: initial   Patient accompanied by: self and  in background     Patient Medical History  Past Medical History:   Diagnosis Date    Anxiety     Benign essential hypertension 2015    added 2nd med 9/15    Depressive disorder     Dysthymia 2014    Endometrial cancer (H)     High cholesterol     IBS (irritable bowel syndrome)        Current Medications    Current Outpatient Medications:     amphetamine-dextroamphetamine (ADDERALL) 5 MG tablet, Take by mouth 2 times daily, Disp: , Rfl:     Ascorbic Acid (VITAMIN C GUMMIES PO), , Disp: , Rfl:     escitalopram (LEXAPRO) 10 MG tablet, TAKE 1 TABLET BY MOUTH EVERY DAY, Disp: 30 tablet, Rfl: 0    Multiple Vitamins-Minerals (MULTI ADULT GUMMIES PO), , Disp: , Rfl:     Medications have been reviewed.    Pertinent lab results:  HDL Cholesterol   Date Value Ref Range Status   02/13/2020 63 >49 mg/dL Final     LDL Cholesterol Calculated   Date Value Ref Range Status   02/13/2020 140 (H) <100 mg/dL Final     Comment:     Above desirable:  100-129 mg/dl  Borderline High:  130-159 mg/dL  High:             160-189 mg/dL  Very high:       >189 mg/dl       Urea Nitrogen   Date Value Ref Range Status   07/12/2022 12 7 - 30 mg/dL Final   02/13/2020 12 7 - 30 mg/dL Final     Potassium   Date Value Ref Range Status   07/12/2022 3.9 3.4 - 5.3 mmol/L Final   02/13/2020 3.6 3.4 - 5.3 mmol/L Final       Labs have been reviewed and noted.    Treatment Plan:  Oncology History    No history exists.     Treatment plan has been reviewed.    Food and Nutrition  "Related History  --Over the counter supplements: as above   --Food Allergies: NKFA  --Food Access: no known issues   --Physical Activity: Patient does not have an exercise regimen  --Oncology treatment side effects: none  --Factors effecting nutritional intake: none     Patient works Monday-Thursday 9 AM-11 AM or 8 AM -10 AM    Dines out for lunch with friends-had 1/2 pizza yesterday     Up at 7 AM; bed at 12     Patient states was told by MD could start taking ozempic which patient declined.     Diet Recall  Breakfast Coffee with almond milk (1/4) 4-5 cups coffee (1/8 honey and cardamon) + tangerine juice (4 oz) toast or banana    Lunch Pretzel; party mix; muffins; toast or deli ham (2 slices) 2 cookies (12:00-1:00) soup    Dinner 8:00-10:00 PM chinese stir florence (chicken, broccoli, celery with basmati rice)   Snacks Ice cream; cheese and crackers    Beverages Cammy beer; bubbly water; vitamin (100 kcals)      ANTHROPOMETRICS  Height: 5'5.5\"  Weight: 193 lbs   BMI: 31.6 kg/m2  Weight Status:  Obesity Grade I BMI 30-34.9  IBW: 128 lbs +/-10% (150%)  Weight History:   Wt Readings from Last 10 Encounters:   10/27/23 87.7 kg (193 lb 6.4 oz)   07/14/23 85.9 kg (189 lb 6.4 oz)   04/04/23 85.5 kg (188 lb 6.4 oz)   01/13/23 85.3 kg (188 lb)   10/18/22 84.6 kg (186 lb 6.4 oz)   09/09/22 82.1 kg (181 lb)   07/12/22 83.9 kg (185 lb)   07/12/22 83.9 kg (185 lb)   06/24/22 82.6 kg (182 lb)   06/06/22 81.6 kg (180 lb)      Dosing Weight: 87.7 kg    MALNUTRITION:  % Weight Loss:  None noted  % Intake:  No decreased intake noted  Subcutaneous Fat Loss:  None observed  Muscle Loss:  None observed  Fluid Retention:  None noted    Malnutrition Diagnosis: Patient does not meet two of the above criteria necessary for diagnosing malnutrition    ASSESSED NUTRITION NEEDS:  Estimated Energy Needs: 4175-3802 kcals (14-17 Kcal/Kg)  Justification: obese  Estimated Protein Needs: 58-70 grams protein (1-1.2 g pro/Kg)  Justification: preservation " of lean body mass  Estimated Fluid Needs: 8621-4608  mL   Justification: maintenance    Nutrition Diagnosis:  Obese, class I related to excess energy intake as evidenced by current BMI of 31.6 kg/m2      Intervention/Recommendations:  Provided written & verbal education:   - Discussed portion sizes, label reading, carbohydrate counting, added sugars, exercise and behavior modification. Instructed patient on label reading using label from home. Suggest aim for 30-45 grams carbohydrate per meal and 15 grams carbohydrate at snack.  Recommend <24 grams added sugar per day.  Encouraged patient to eat breakfast within 1 hour of waking and then meals every 4-6 hours.  Patient to include protein source at each meal to balance carbohydrate choice.  - Discussed foods to fight cancer-legumes, whole grains, fruit, cruciferous vegetables 4-6 times per week.        Provided pt with corresponding education materials/handouts on:  My Plate; Tips for Weight Loss and Weight Management     Goals:  Aim for movement 3 times per week for 20 minutes    Track snacks and time (2 work days and 1 weekend day)    Monitor and Evaluation:  Progress towards goals will be monitored and evaluated per protocol and Practice Guidelines  Patient to follow up in 5 weeks   RD name and number provided     Cynthia Oneill, RD, LD  Clinical Dietitian  [facility]  [phone] (direct)

## 2024-01-23 NOTE — LETTER
1/23/2024         RE: Dixie Miller  [address]  [address]  [facility] [address]        Dear Colleague,    Thank you for referring your patient, Dixie Miller, to the [facility] CANCER [facility]. Please see a copy of my visit note below.    Virtual Visit Details    Type of service:  Video Visit     Originating Location (pt. Location): Home    Distant Location (provider location):  On-site  Platform used for Video Visit: [facility]     CLINICAL NUTRITION SERVICES - ASSESSMENT NOTE    Dixie Miller 63 year old referred for MNT related to Endometrial cancer (H) [C54.1]  - Primary  cancer    Total Time Spent with patient: 35 min  Referred by: Dr. Good  Reason for referral: initial   Patient accompanied by: self and  in background     Patient Medical History  Past Medical History:   Diagnosis Date     Anxiety      Benign essential hypertension 2015    added 2nd med 9/15     Depressive disorder      Dysthymia 2014     Endometrial cancer (H)      High cholesterol      IBS (irritable bowel syndrome)        Current Medications    Current Outpatient Medications:      amphetamine-dextroamphetamine (ADDERALL) 5 MG tablet, Take by mouth 2 times daily, Disp: , Rfl:      Ascorbic Acid (VITAMIN C GUMMIES PO), , Disp: , Rfl:      escitalopram (LEXAPRO) 10 MG tablet, TAKE 1 TABLET BY MOUTH EVERY DAY, Disp: 30 tablet, Rfl: 0     Multiple Vitamins-Minerals (MULTI ADULT GUMMIES PO), , Disp: , Rfl:     Medications have been reviewed.    Pertinent lab results:  HDL Cholesterol   Date Value Ref Range Status   02/13/2020 63 >49 mg/dL Final     LDL Cholesterol Calculated   Date Value Ref Range Status   02/13/2020 140 (H) <100 mg/dL Final     Comment:     Above desirable:  100-129 mg/dl  Borderline High:  130-159 mg/dL  High:             160-189 mg/dL  Very high:       >189 mg/dl       Urea Nitrogen   Date Value Ref Range Status   07/12/2022 12 7 - 30 mg/dL Final   02/13/2020 12 7 - 30 mg/dL  "Final     Potassium   Date Value Ref Range Status   07/12/2022 3.9 3.4 - 5.3 mmol/L Final   02/13/2020 3.6 3.4 - 5.3 mmol/L Final       Labs have been reviewed and noted.    Treatment Plan:  Oncology History    No history exists.     Treatment plan has been reviewed.    Food and Nutrition Related History  --Over the counter supplements: as above   --Food Allergies: NKFA  --Food Access: no known issues   --Physical Activity: Patient does not have an exercise regimen  --Oncology treatment side effects: none  --Factors effecting nutritional intake: none     Patient works Monday-Thursday 9 AM-11 AM or 8 AM -10 AM    Dines out for lunch with friends-had 1/2 pizza yesterday     Up at 7 AM; bed at 12     Patient states was told by MD could start taking ozempic which patient declined.     Diet Recall  Breakfast Coffee with almond milk (1/4) 4-5 cups coffee (1/8 honey and cardamon) + tangerine juice (4 oz) toast or banana    Lunch Pretzel; party mix; muffins; toast or deli ham (2 slices) 2 cookies (12:00-1:00) soup    Dinner 8:00-10:00 PM chinese stir florence (chicken, broccoli, celery with basmati rice)   Snacks Ice cream; cheese and crackers    Beverages Cammy beer; bubbly water; vitamin (100 kcals)      ANTHROPOMETRICS  Height: 5'5.5\"  Weight: 193 lbs   BMI: 31.6 kg/m2  Weight Status:  Obesity Grade I BMI 30-34.9  IBW: 128 lbs +/-10% (150%)  Weight History:   Wt Readings from Last 10 Encounters:   10/27/23 87.7 kg (193 lb 6.4 oz)   07/14/23 85.9 kg (189 lb 6.4 oz)   04/04/23 85.5 kg (188 lb 6.4 oz)   01/13/23 85.3 kg (188 lb)   10/18/22 84.6 kg (186 lb 6.4 oz)   09/09/22 82.1 kg (181 lb)   07/12/22 83.9 kg (185 lb)   07/12/22 83.9 kg (185 lb)   06/24/22 82.6 kg (182 lb)   06/06/22 81.6 kg (180 lb)      Dosing Weight: 87.7 kg    MALNUTRITION:  % Weight Loss:  None noted  % Intake:  No decreased intake noted  Subcutaneous Fat Loss:  None observed  Muscle Loss:  None observed  Fluid Retention:  None noted    Malnutrition " Diagnosis: Patient does not meet two of the above criteria necessary for diagnosing malnutrition    ASSESSED NUTRITION NEEDS:  Estimated Energy Needs: 2119-6266 kcals (14-17 Kcal/Kg)  Justification: obese  Estimated Protein Needs: 58-70 grams protein (1-1.2 g pro/Kg)  Justification: preservation of lean body mass  Estimated Fluid Needs: 6860-2442  mL   Justification: maintenance    Nutrition Diagnosis:  Obese, class I related to excess energy intake as evidenced by current BMI of 31.6 kg/m2      Intervention/Recommendations:  Provided written & verbal education:   - Discussed portion sizes, label reading, carbohydrate counting, added sugars, exercise and behavior modification. Instructed patient on label reading using label from home. Suggest aim for 30-45 grams carbohydrate per meal and 15 grams carbohydrate at snack.  Recommend <24 grams added sugar per day.  Encouraged patient to eat breakfast within 1 hour of waking and then meals every 4-6 hours.  Patient to include protein source at each meal to balance carbohydrate choice.  - Discussed foods to fight cancer-legumes, whole grains, fruit, cruciferous vegetables 4-6 times per week.        Provided pt with corresponding education materials/handouts on:  My Plate; Tips for Weight Loss and Weight Management     Goals:  Aim for movement 3 times per week for 20 minutes    Track snacks and time (2 work days and 1 weekend day)    Monitor and Evaluation:  Progress towards goals will be monitored and evaluated per protocol and Practice Guidelines  Patient to follow up in 5 weeks   RUPINDER name and number provided     Cynthia Oneill, RUPINDER, LD  Clinical Dietitian  [facility]  [phone] (direct)        Again, thank you for allowing me to participate in the care of your patient.        Sincerely,        Toby Christie RD, LD

## 2024-01-24 ENCOUNTER — APPOINTMENT (OUTPATIENT)
Dept: URBAN - METROPOLITAN AREA CLINIC 256 | Age: 64
Setting detail: DERMATOLOGY
End: 2024-01-24

## 2024-01-24 VITALS — HEIGHT: 66 IN | WEIGHT: 180 LBS

## 2024-01-24 DIAGNOSIS — L57.8 OTHER SKIN CHANGES DUE TO CHRONIC EXPOSURE TO NONIONIZING RADIATION: ICD-10-CM

## 2024-01-24 DIAGNOSIS — L28.1 PRURIGO NODULARIS: ICD-10-CM

## 2024-01-24 DIAGNOSIS — I78.1 NEVUS, NON-NEOPLASTIC: ICD-10-CM

## 2024-01-24 DIAGNOSIS — L82.1 OTHER SEBORRHEIC KERATOSIS: ICD-10-CM

## 2024-01-24 DIAGNOSIS — Z71.89 OTHER SPECIFIED COUNSELING: ICD-10-CM

## 2024-01-24 DIAGNOSIS — L73.8 OTHER SPECIFIED FOLLICULAR DISORDERS: ICD-10-CM

## 2024-01-24 DIAGNOSIS — D18.0 HEMANGIOMA: ICD-10-CM

## 2024-01-24 DIAGNOSIS — D22 MELANOCYTIC NEVI: ICD-10-CM

## 2024-01-24 DIAGNOSIS — Z41.9 ENCOUNTER FOR PROCEDURE FOR PURPOSES OTHER THAN REMEDYING HEALTH STATE, UNSPECIFIED: ICD-10-CM

## 2024-01-24 PROBLEM — D22.61 MELANOCYTIC NEVI OF RIGHT UPPER LIMB, INCLUDING SHOULDER: Status: ACTIVE | Noted: 2024-01-24

## 2024-01-24 PROBLEM — D22.5 MELANOCYTIC NEVI OF TRUNK: Status: ACTIVE | Noted: 2024-01-24

## 2024-01-24 PROBLEM — D18.01 HEMANGIOMA OF SKIN AND SUBCUTANEOUS TISSUE: Status: ACTIVE | Noted: 2024-01-24

## 2024-01-24 PROBLEM — D22.71 MELANOCYTIC NEVI OF RIGHT LOWER LIMB, INCLUDING HIP: Status: ACTIVE | Noted: 2024-01-24

## 2024-01-24 PROBLEM — D22.62 MELANOCYTIC NEVI OF LEFT UPPER LIMB, INCLUDING SHOULDER: Status: ACTIVE | Noted: 2024-01-24

## 2024-01-24 PROBLEM — D22.72 MELANOCYTIC NEVI OF LEFT LOWER LIMB, INCLUDING HIP: Status: ACTIVE | Noted: 2024-01-24

## 2024-01-24 PROCEDURE — OTHER COUNSELING: OTHER

## 2024-01-24 PROCEDURE — OTHER MIPS QUALITY: OTHER

## 2024-01-24 PROCEDURE — 99213 OFFICE O/P EST LOW 20 MIN: CPT

## 2024-01-24 PROCEDURE — OTHER BOTOX: OTHER

## 2024-01-24 PROCEDURE — OTHER PHOTO-DOCUMENTATION: OTHER

## 2024-01-24 ASSESSMENT — LOCATION DETAILED DESCRIPTION DERM
LOCATION DETAILED: LEFT INFERIOR CENTRAL MALAR CHEEK
LOCATION DETAILED: LEFT ANTECUBITAL SKIN
LOCATION DETAILED: RIGHT DISTAL POSTERIOR UPPER ARM
LOCATION DETAILED: RIGHT LATERAL ZYGOMA
LOCATION DETAILED: SUPERIOR THORACIC SPINE
LOCATION DETAILED: INFERIOR THORACIC SPINE
LOCATION DETAILED: RIGHT CENTRAL MALAR CHEEK
LOCATION DETAILED: LEFT VENTRAL PROXIMAL FOREARM
LOCATION DETAILED: RIGHT VENTRAL PROXIMAL FOREARM
LOCATION DETAILED: LEFT ANTERIOR DISTAL THIGH
LOCATION DETAILED: RIGHT ANTERIOR DISTAL THIGH
LOCATION DETAILED: LEFT DISTAL POSTERIOR UPPER ARM
LOCATION DETAILED: RIGHT VENTRAL DISTAL FOREARM
LOCATION DETAILED: LEFT MEDIAL UPPER BACK
LOCATION DETAILED: LEFT VENTRAL DISTAL FOREARM
LOCATION DETAILED: LEFT INFERIOR LATERAL MIDBACK
LOCATION DETAILED: LEFT SUPERIOR CENTRAL MALAR CHEEK

## 2024-01-24 ASSESSMENT — LOCATION ZONE DERM
LOCATION ZONE: LEG
LOCATION ZONE: ARM
LOCATION ZONE: FACE
LOCATION ZONE: TRUNK

## 2024-01-24 ASSESSMENT — LOCATION SIMPLE DESCRIPTION DERM
LOCATION SIMPLE: RIGHT ZYGOMA
LOCATION SIMPLE: RIGHT FOREARM
LOCATION SIMPLE: LEFT LOWER BACK
LOCATION SIMPLE: LEFT THIGH
LOCATION SIMPLE: LEFT UPPER ARM
LOCATION SIMPLE: RIGHT THIGH
LOCATION SIMPLE: RIGHT CHEEK
LOCATION SIMPLE: LEFT UPPER BACK
LOCATION SIMPLE: RIGHT POSTERIOR UPPER ARM
LOCATION SIMPLE: LEFT POSTERIOR UPPER ARM
LOCATION SIMPLE: UPPER BACK
LOCATION SIMPLE: LEFT FOREARM
LOCATION SIMPLE: LEFT CHEEK

## 2024-01-24 NOTE — PROCEDURE: PHOTO-DOCUMENTATION
Photo Preface (Leave Blank If You Do Not Want): Photographs were obtained today of patient’s back for monitoring.
Detail Level: Zone

## 2024-01-24 NOTE — PROCEDURE: BOTOX
Glabellar Complex Units: 0
Show Additional Area 4: Yes
Additional Area 6 Location: under arms
Show Right And Left Periorbital Units: No
Detail Level: Detailed
Dilution (U/0.1 Cc): 4
Periorbital Skin Units: 10
Incrementing Botox Units: By 0.1 Units
Additional Area 5 Location: jelly roll
Additional Area 3 Location: bunny lines
Consent: Written consent obtained. Risks include but not limited to lid/brow ptosis, bruising, swelling, diplopia, temporary effect, incomplete chemical denervation.
Show Inventory Tab: Show
Additional Area 4 Location: mentalist
Additional Area 2 Location: lip flip
Post-Care Instructions: Patient instructed to not lie down for 4 hours and limit physical activity for 24 hours.
Additional Area 1 Location: jawline
Forehead Units: 20

## 2024-01-24 NOTE — HPI: FULL BODY SKIN EXAMINATION
What Is The Reason For Today's Visit?: Full Body Skin Examination
What Is The Reason For Today's Visit? (Being Monitored For X): the development of new lesions
Additional History: Right side of face scaly, itchy mole on right back, scaly mole on right wrist

## 2024-02-07 ENCOUNTER — APPOINTMENT (OUTPATIENT)
Dept: URBAN - METROPOLITAN AREA CLINIC 256 | Age: 64
Setting detail: DERMATOLOGY
End: 2024-02-07

## 2024-02-07 DIAGNOSIS — Z41.9 ENCOUNTER FOR PROCEDURE FOR PURPOSES OTHER THAN REMEDYING HEALTH STATE, UNSPECIFIED: ICD-10-CM

## 2024-02-07 PROCEDURE — OTHER COSMETIC FOLLOW-UP: OTHER

## 2024-02-07 NOTE — PROCEDURE: COSMETIC FOLLOW-UP
Detail Level: Zone
Comments (Free Text): Client came in for follow up. Skin looks amazing - client is very happy. Does feel a little heavy on her lids. We used her touch up units for upper eye lid. Client was wondering why her glabella line was still there - assessed the movement and their is non - showed her that when she tried to contact their was no movement, when she starts to notice that the movement reinforces the wrinkle THATS when she needs to come back in to get more.
No complaints

## 2024-04-19 ENCOUNTER — ONCOLOGY VISIT (OUTPATIENT)
Dept: ONCOLOGY | Facility: CLINIC | Age: 64
End: 2024-04-19
Attending: NURSE PRACTITIONER
Payer: COMMERCIAL

## 2024-04-19 VITALS
BODY MASS INDEX: 32.28 KG/M2 | HEART RATE: 78 BPM | OXYGEN SATURATION: 98 % | SYSTOLIC BLOOD PRESSURE: 147 MMHG | TEMPERATURE: 97.9 F | DIASTOLIC BLOOD PRESSURE: 74 MMHG | RESPIRATION RATE: 18 BRPM | WEIGHT: 197 LBS

## 2024-04-19 DIAGNOSIS — Z85.42 ENCOUNTER FOR FOLLOW-UP SURVEILLANCE OF ENDOMETRIAL CANCER: Primary | ICD-10-CM

## 2024-04-19 DIAGNOSIS — Z08 ENCOUNTER FOR FOLLOW-UP SURVEILLANCE OF ENDOMETRIAL CANCER: Primary | ICD-10-CM

## 2024-04-19 PROCEDURE — 99213 OFFICE O/P EST LOW 20 MIN: CPT | Performed by: NURSE PRACTITIONER

## 2024-04-19 PROCEDURE — G0463 HOSPITAL OUTPT CLINIC VISIT: HCPCS | Performed by: NURSE PRACTITIONER

## 2024-04-19 ASSESSMENT — PAIN SCALES - GENERAL: PAINLEVEL: NO PAIN (0)

## 2024-04-19 NOTE — PROGRESS NOTES
Gynecologic Oncology Follow Up Note    RE: Dixie Miller   : 1960  DERICK: 2024  PRONOUNS: she/her/hers  GYNECOLOGIC ONCOLOGIST: Germania Good MD    CC: Dixie Miller is a 63 year old female with a history of stage II, grade 3 endometrial adenocarcinoma, dMMR type, presenting for surveillance    INTERVAL HISTORY:    Meliza comes to the clinic generally feeling quite well without gynecologic concerns. Using dilator sporadically- about once per month. Not sexually active right now, but would like to get back into this at some point. No pelvic pain or vaginal bleeding.    Up to date on health screenings. No smoking, no alcohol.    Enrolled in GY-020, q6mo imaging next due in three months.      ONCOLOGY HISTORY:  Patient initially presented with a month history of PMB with cramping.  This was mostly light spotting and she has not had any heavy bleeding. She also has been having a rash which is patchy, itchy and moves to several areas of her body for the past several months.  She had a biopsy which showed non-specific dermatitis.  She has follow up with dermatology for a second opinion later this week.     22:  US pelvis:  Uterus: anteverted. Contour is smooth/regular. Size: 8.21 x 5.20 x 4.44 cm Endometrium: Thickness Total 24.00 mm Findings: Thick Right Ovary: Not seen Left Ovary: 1.87 x 1.61 x .95 cm. Wnl Cul de Sac Free Fluid: No free fluid     22:  EMB:  High grade carcinoma with squamous differentiation     22:  : 24     6/3/22:  CT C/A/P:  1.  1 cm nodule in the right breast centrally. 2.  5 mm on the nodule in the lateral right lower lobe. 3.  Low dense lesion too small to characterize in the liver is probably a cyst. 4.  Markedly thickened endometrium compatible with history. 5.  No adenopathy. 6.  L2 compression deformity.     22:  Total laparoscopic hysterectomy, bilateral salpingo-oophorectomy, bilateral pelvic sentinel lymph node dissection, lysis of  adhesions for > 30 minutes, omentectomy, cystoscopy                 Pathology:  Grade 3 endometrial adenocarcinoma with extensive squamous differentiation, tumor size 6.6 cm, 9/13 mm myometrial invasion, + JENNIFER, + cervix, +LVSI, 0/11 sentinel LN, negative omentum, Loss of MLH1/PMS2 with + hypermethylation, p53 normal     22-22:  Completed XRT per  study protocol (Control arm)     24:  CT C/A/P:  No evidence of metastatic disease in the chest, abdomen, or pelvis. No change from previous.           Past Medical History:   Diagnosis Date    Anxiety     Benign essential hypertension     added 2nd med 9/15    Depressive disorder     Dysthymia     Endometrial cancer (H)     High cholesterol     IBS (irritable bowel syndrome)       Past Surgical History:   Procedure Laterality Date     SECTION      LAPAROSCOPIC HYSTERECTOMY TOTAL, BILATERAL SALPINGO-OOPHORECTOMY, COMBINED N/A 2022    Procedure: laparoscopic removal of uterus, cervix, bilateral ovaries and fallopian tubes, bilateral pelvic sentinel lymph node dissection, lysis of adhesions 30 minutes, omentectomy, cystoscopy;  Surgeon: Germania Espinal MD;  [place] [facility] [place]     Social History     Socioeconomic History    Marital status:      Spouse name: None    Number of children: 2    Years of education: None    Highest education level: None   Occupational History    Occupation: retail at [employer]   Tobacco Use    Smoking status: Never    Smokeless tobacco: Never   Substance and Sexual Activity    Alcohol use: Yes     Alcohol/week: 0.0 standard drinks of alcohol     Comment: 3 wine a day    Drug use: No    Sexual activity: Not Currently     Partners: Male     Birth control/protection: Post-menopausal     Social Determinants of Health     Interpersonal Safety: Not At Risk (10/18/2022)    Humiliation, Afraid, Rape, and Kick questionnaire     Fear of Current or Ex-Partner: No     Emotionally Abused: No      Physically Abused: No     Sexually Abused: No      Family History   Problem Relation Age of Onset    Diabetes Type 2  Mother     Diabetes Father     Alcoholism Father     No Known Problems Sister     No Known Problems Sister     No Known Problems Sister     Alcoholism Brother     Multiple myeloma Brother     Pancreatic Cancer Brother     No Known Problems Maternal Grandmother     No Known Problems Maternal Grandfather     No Known Problems Paternal Grandmother     No Known Problems Other     Breast Cancer Maternal Cousin             OBJECTIVE:    PHYSICAL EXAM:  BP (!) 147/74 (BP Location: Right arm, Patient Position: Sitting, Cuff Size: Adult Large)   Pulse 78   Temp 97.9  F (36.6  C) (Oral)   Resp 18   Wt 89.4 kg (197 lb)   SpO2 98%   BMI 32.28 kg/m       CONSTITUTIONAL: Alert non-toxic appearing female in no acute distress  RESPIRATORY: Lungs clear to auscultation, respiratory effort unlabored  CV: Regular rate and rhythm, S1S2, no murmurs; bilateral lower extremities without edema  GASTROINTESTINAL: Normoactive bowel sounds, abdomen soft, non-distended, and non-tender to palpation without masses or organomegaly  GENITOURINARY: External genitalia and urethral meatus pink without lesions, masses, or excoriation. Vagina pink and smooth without masses or lesions. Cervix surgically absent. Vaginal cuff without masses or lesions. Bimanual exam reveals no masses or fullness. Rectovaginal exam confirms these findings.  LYMPHATIC: Cervical, supraclavicular, and inguinal lymph nodes without adenopathy  NEUROLOGIC: Grossly intact, normal gait  MUSCULOSKELETAL: Moves all extremities, no obvious muscle wasting  SKIN: Appropriate color for race, warm and dry  PSYCHIATRIC: Pleasant and interactive, affect euthymic, makes appropriate eye contact, thought process linear           ASSESSMENT/PLAN:    1) History of stage II, grade 3 endometrial adenocarcinoma, dMMR type: No clinical evidence of disease on today's exam.  Continue surveillance q3mo x2 years (through 8/2024) then q6mo x3 years (through 8/2027), then annually thereafter. CT CAP q6mo per  protocol- this has been ordered by Dr. Good and she is scheduled for imaging and follow up with Dr. Good in July. Reviewed signs and symptoms of recurrent disease and when to seek further care.  2) Treatment/disease related effects:  None- continue vaginal dilator use, recommend weekly  3) Genetics: Performed- [facility] Common Hereditary Cancers panel- negative for pathogenic mutation  4) Health maintenance issues discussed include to follow up with PCP for routine cancer screenings, non-gynecologic concerns, and co-morbid conditions  5) Patient verbalized understanding of and agreement with plan- see AVS for patient instructions      20 minutes spent on date of service on visit, including chart review, face to face visit, documentation, and coordination of care.    TRENT Hagen, CNP (she/her)  Division of Gynecologic Oncology

## 2024-04-19 NOTE — PATIENT INSTRUCTIONS
Your visit today was with Brianne Escalante CNP at [facility] for endometrial cancer surveillance.    Plan:  No evidence of disease!  Try using your dilator once per week  Scan in three months and follow up with Dr. Good- these have already been scheduled    SIGNS AND SYMPTOMS OF RECURRENT DISEASE    Symptoms of cancer coming back can vary from person to person and it's important to know your own baseline and health factors that may cause symptoms. The following list includes symptoms that would warrant further investigation with your oncology team.    Vaginal bleeding or spotting  Persistent pelvic, abdominal, or bone pain that is persistent and does not improve over time  New persistent dry cough or shortness of breath at rest without a known cause  Unintentional weight loss  New swelling in the legs  New changes in bowel/bladder patterns  New, persistent bloating/fullness  New, persistent onset of drenching night sweats  New, persistent lumps or bumps in the groin or neck  New severe headaches, tunnel vision, double vision, or seizures  Sudden and persistent fatigue without a known cause, particularly if associated with any of the above symptoms    If you have symptoms that are new, persistent, or concerning to you and have been ongoing for two weeks or longer, please contact your oncology care team.      For urgent questions or concerns, please call rather than send a medical message.   M-F 8-4:30: [phone]   After hours, weekends, and holidays: [phone] and ask to speak to the gynecologic oncology resident on call

## 2024-04-19 NOTE — LETTER
2024         RE: Dixie Miller  [address]  [address]  [facility] [address]        Dear Colleague,    Thank you for referring your patient, Dixie Miller, to the [facility]CER [facility]. Please see a copy of my visit note below.    Gynecologic Oncology Follow Up Note    RE: Dixie Miller   : 1960  DERICK: 2024  PRONOUNS: she/her/hers  GYNECOLOGIC ONCOLOGIST: Germania Good MD    CC: Dixie Miller is a 63 year old female with a history of stage II, grade 3 endometrial adenocarcinoma, dMMR type, presenting for surveillance    INTERVAL HISTORY:    Meliza comes to the clinic generally feeling quite well without gynecologic concerns. Using dilator sporadically- about once per month. Not sexually active right now, but would like to get back into this at some point. No pelvic pain or vaginal bleeding.    Up to date on health screenings. No smoking, no alcohol.    Enrolled in GY-020, q6mo imaging next due in three months.      ONCOLOGY HISTORY:  Patient initially presented with a month history of PMB with cramping.  This was mostly light spotting and she has not had any heavy bleeding. She also has been having a rash which is patchy, itchy and moves to several areas of her body for the past several months.  She had a biopsy which showed non-specific dermatitis.  She has follow up with dermatology for a second opinion later this week.     22:  US pelvis:  Uterus: anteverted. Contour is smooth/regular. Size: 8.21 x 5.20 x 4.44 cm Endometrium: Thickness Total 24.00 mm Findings: Thick Right Ovary: Not seen Left Ovary: 1.87 x 1.61 x .95 cm. Wnl Cul de Sac Free Fluid: No free fluid     22:  EMB:  High grade carcinoma with squamous differentiation     22:  : 24     6/3/22:  CT C/A/P:  1.  1 cm nodule in the right breast centrally. 2.  5 mm on the nodule in the lateral right lower lobe. 3.  Low dense lesion too small to characterize in the liver is  probably a cyst. 4.  Markedly thickened endometrium compatible with history. 5.  No adenopathy. 6.  L2 compression deformity.     22:  Total laparoscopic hysterectomy, bilateral salpingo-oophorectomy, bilateral pelvic sentinel lymph node dissection, lysis of adhesions for > 30 minutes, omentectomy, cystoscopy                 Pathology:  Grade 3 endometrial adenocarcinoma with extensive squamous differentiation, tumor size 6.6 cm, 9/13 mm myometrial invasion, + JENNIFER, + cervix, +LVSI, 0/11 sentinel LN, negative omentum, Loss of MLH1/PMS2 with + hypermethylation, p53 normal     22-22:  Completed XRT per  study protocol (Control arm)     24:  CT C/A/P:  No evidence of metastatic disease in the chest, abdomen, or pelvis. No change from previous.           Past Medical History:   Diagnosis Date     Anxiety      Benign essential hypertension     added 2nd med 9/15     Depressive disorder      Dysthymia      Endometrial cancer (H)      High cholesterol      IBS (irritable bowel syndrome)       Past Surgical History:   Procedure Laterality Date      SECTION       LAPAROSCOPIC HYSTERECTOMY TOTAL, BILATERAL SALPINGO-OOPHORECTOMY, COMBINED N/A 2022    Procedure: laparoscopic removal of uterus, cervix, bilateral ovaries and fallopian tubes, bilateral pelvic sentinel lymph node dissection, lysis of adhesions 30 minutes, omentectomy, cystoscopy;  Surgeon: Germania Espinal MD;  Location: [facility] [place]     Social History     Socioeconomic History     Marital status:      Spouse name: None     Number of children: 2     Years of education: None     Highest education level: None   Occupational History     Occupation: retail at [employer]   Tobacco Use     Smoking status: Never     Smokeless tobacco: Never   Substance and Sexual Activity     Alcohol use: Yes     Alcohol/week: 0.0 standard drinks of alcohol     Comment: 3 wine a day     Drug use: No     Sexual activity: Not  Currently     Partners: Male     Birth control/protection: Post-menopausal     Social Determinants of Health     Interpersonal Safety: Not At Risk (10/18/2022)    Humiliation, Afraid, Rape, and Kick questionnaire      Fear of Current or Ex-Partner: No      Emotionally Abused: No      Physically Abused: No      Sexually Abused: No      Family History   Problem Relation Age of Onset     Diabetes Type 2  Mother      Diabetes Father      Alcoholism Father      No Known Problems Sister      No Known Problems Sister      No Known Problems Sister      Alcoholism Brother      Multiple myeloma Brother      Pancreatic Cancer Brother      No Known Problems Maternal Grandmother      No Known Problems Maternal Grandfather      No Known Problems Paternal Grandmother      No Known Problems Other      Breast Cancer Maternal Cousin             OBJECTIVE:    PHYSICAL EXAM:  BP (!) 147/74 (BP Location: Right arm, Patient Position: Sitting, Cuff Size: Adult Large)   Pulse 78   Temp 97.9  F (36.6  C) (Oral)   Resp 18   Wt 89.4 kg (197 lb)   SpO2 98%   BMI 32.28 kg/m       CONSTITUTIONAL: Alert non-toxic appearing female in no acute distress  RESPIRATORY: Lungs clear to auscultation, respiratory effort unlabored  CV: Regular rate and rhythm, S1S2, no murmurs; bilateral lower extremities without edema  GASTROINTESTINAL: Normoactive bowel sounds, abdomen soft, non-distended, and non-tender to palpation without masses or organomegaly  GENITOURINARY: External genitalia and urethral meatus pink without lesions, masses, or excoriation. Vagina pink and smooth without masses or lesions. Cervix surgically absent. Vaginal cuff without masses or lesions. Bimanual exam reveals no masses or fullness. Rectovaginal exam confirms these findings.  LYMPHATIC: Cervical, supraclavicular, and inguinal lymph nodes without adenopathy  NEUROLOGIC: Grossly intact, normal gait  MUSCULOSKELETAL: Moves all extremities, no obvious muscle wasting  SKIN:  Appropriate color for race, warm and dry  PSYCHIATRIC: Pleasant and interactive, affect euthymic, makes appropriate eye contact, thought process linear           ASSESSMENT/PLAN:    1) History of stage II, grade 3 endometrial adenocarcinoma, dMMR type: No clinical evidence of disease on today's exam. Continue surveillance q3mo x2 years (through 8/2024) then q6mo x3 years (through 8/2027), then annually thereafter. CT CAP q6mo per  protocol- this has been ordered by Dr. Good and she is scheduled for imaging and follow up with Dr. Good in July. Reviewed signs and symptoms of recurrent disease and when to seek further care.  2) Treatment/disease related effects:  None- continue vaginal dilator use, recommend weekly  3) Genetics: Performed- [facility] Common Hereditary Cancers panel- negative for pathogenic mutation  4) Health maintenance issues discussed include to follow up with PCP for routine cancer screenings, non-gynecologic concerns, and co-morbid conditions  5) Patient verbalized understanding of and agreement with plan- see AVS for patient instructions      20 minutes spent on date of service on visit, including chart review, face to face visit, documentation, and coordination of care.    TRENT Hagen, CNP (she/her)  Division of Gynecologic Oncology        Again, thank you for allowing me to participate in the care of your patient.        Sincerely,        TRENT Hagen CNP

## 2024-05-28 ENCOUNTER — TRANSFERRED RECORDS (OUTPATIENT)
Dept: HEALTH INFORMATION MANAGEMENT | Facility: CLINIC | Age: 64
End: 2024-05-28
Payer: COMMERCIAL

## 2024-07-17 ENCOUNTER — HOSPITAL ENCOUNTER (OUTPATIENT)
Dept: CT IMAGING | Facility: CLINIC | Age: 64
Discharge: HOME | End: 2024-07-17
Attending: OBSTETRICS & GYNECOLOGY | Admitting: OBSTETRICS & GYNECOLOGY
Payer: COMMERCIAL

## 2024-07-17 DIAGNOSIS — C54.1 ENDOMETRIAL CANCER (H): ICD-10-CM

## 2024-07-17 PROCEDURE — 71260 CT THORAX DX C+: CPT

## 2024-07-17 PROCEDURE — 250N000009 HC RX 250: Performed by: OBSTETRICS & GYNECOLOGY

## 2024-07-17 PROCEDURE — 250N000011 HC RX IP 250 OP 636: Performed by: OBSTETRICS & GYNECOLOGY

## 2024-07-17 RX ORDER — IOPAMIDOL 755 MG/ML
97 INJECTION, SOLUTION INTRAVASCULAR ONCE
Status: COMPLETED | OUTPATIENT
Start: 2024-07-17 | End: 2024-07-17

## 2024-07-17 RX ADMIN — SODIUM CHLORIDE 67 ML: 9 INJECTION, SOLUTION INTRAVENOUS at 11:55

## 2024-07-17 RX ADMIN — IOPAMIDOL 97 ML: 755 INJECTION, SOLUTION INTRAVENOUS at 11:55

## 2024-07-19 ENCOUNTER — ONCOLOGY VISIT (OUTPATIENT)
Dept: ONCOLOGY | Facility: CLINIC | Age: 64
End: 2024-07-19
Attending: OBSTETRICS & GYNECOLOGY
Payer: COMMERCIAL

## 2024-07-19 VITALS
HEART RATE: 84 BPM | BODY MASS INDEX: 31.4 KG/M2 | SYSTOLIC BLOOD PRESSURE: 135 MMHG | WEIGHT: 191.6 LBS | OXYGEN SATURATION: 98 % | TEMPERATURE: 98 F | DIASTOLIC BLOOD PRESSURE: 81 MMHG | RESPIRATION RATE: 18 BRPM

## 2024-07-19 DIAGNOSIS — C54.1 ENDOMETRIAL CANCER (H): Primary | ICD-10-CM

## 2024-07-19 PROCEDURE — 99214 OFFICE O/P EST MOD 30 MIN: CPT | Performed by: OBSTETRICS & GYNECOLOGY

## 2024-07-19 PROCEDURE — G0463 HOSPITAL OUTPT CLINIC VISIT: HCPCS | Performed by: OBSTETRICS & GYNECOLOGY

## 2024-07-19 ASSESSMENT — PAIN SCALES - GENERAL: PAINLEVEL: NO PAIN (0)

## 2024-07-19 NOTE — LETTER
2024      Dixie Miller  [address]  [address]  [facility] [address]      Dear Colleague,    Thank you for referring your patient, Dixie Miller, to the [facility]. Please see a copy of my visit note below.    Consult Notes on Referred Patient        Dr. Stefanie Cho Masters, DO  [address]  [place] [address]       RE: Dixie Miller  : 1960  DERICK: 2024    HPI:  Dixie Miller is 63 year old with stage II, grade 3 endometrial adenocarcinoma, dMMR type.  She is unaccompanied today.  She is overall feeling well and has no specific concerns.  She denies vaginal bleeding.  She has noted decreased libido lately.    Cancer Course:  Patient initially presented with a month history of PMB with cramping.  This was mostly light spotting and she has not had any heavy bleeding. She also has been having a rash which is patchy, itchy and moves to several areas of her body for the past several months.  She had a biopsy which showed non-specific dermatitis.  She has follow up with dermatology for a second opinion later this week.    22:  US pelvis:  Uterus: anteverted. Contour is smooth/regular. Size: 8.21 x 5.20 x 4.44 cm Endometrium: Thickness Total 24.00 mm Findings: Thick Right Ovary: Not seen Left Ovary: 1.87 x 1.61 x .95 cm. Wnl Cul de Sac Free Fluid: No free fluid    22:  EMB:  High grade carcinoma with squamous differentiation    22:  : 24    6/3/22:  CT C/A/P:  1.  1 cm nodule in the right breast centrally. 2.  5 mm on the nodule in the lateral right lower lobe. 3.  Low dense lesion too small to characterize in the liver is probably a cyst. 4.  Markedly thickened endometrium compatible with history. 5.  No adenopathy. 6.  L2 compression deformity.    22:  Total laparoscopic hysterectomy, bilateral salpingo-oophorectomy, bilateral pelvic sentinel lymph node dissection, lysis of adhesions for > 30 minutes,  omentectomy, cystoscopy   Pathology:  Grade 3 endometrial adenocarcinoma with extensive squamous differentiation, tumor size 6.6 cm, 9/13 mm myometrial invasion, + JENNIFER, + cervix, +LVSI, 0/11 sentinel LN, negative omentum, Loss of MLH1/PMS2 with + hypermethylation, p53 normal    22-22:  Completed XRT per  study protocol (Control arm)    24:  CT C/A/P (personally reviewed):  Since 2024, no evidence of progressive metastatic disease within the chest, abdomen or pelvis.      Obstetrics and Gynecology History:  ,  x 2, c/s x 1  Menopause around age 54, no HRT use      Past Medical History:  Past Medical History:   Diagnosis Date     Anxiety      Benign essential hypertension     added 2nd med 9/15     Depressive disorder      Dysthymia      Endometrial cancer (H)      High cholesterol      IBS (irritable bowel syndrome)        Past Surgical History:  Past Surgical History:   Procedure Laterality Date      SECTION       LAPAROSCOPIC HYSTERECTOMY TOTAL, BILATERAL SALPINGO-OOPHORECTOMY, COMBINED N/A 2022    Procedure: laparoscopic removal of uterus, cervix, bilateral ovaries and fallopian tubes, bilateral pelvic sentinel lymph node dissection, lysis of adhesions 30 minutes, omentectomy, cystoscopy;  Surgeon: Germania Espinal MD;  [place] [facility] [place]       Health Maintenance:  Last Pap Smear: No need for further pap smear exams s/p hysterectomy  She has not had a history of abnormal Pap smears.    Last Mammogram: 23             Result: normal      She has not had a history of abnormal mammograms.    Last Colonoscopy: 24              Result: Polyps, repeat 5 years       Social History:  Lives with her , feels safe at home.  Works in retail part time.  Enjoys swimming, walking, spending time with friends, book clubs.  Does have an advanced directive on file and would like her son, Steve and her , Endy to be her POA.  Would like full  resuscitation if reversible cause is identified, however would not like to be kept on life sustaining measures long-term.     Family History:   The patient's family history is significant for.  Family History   Problem Relation Age of Onset     Diabetes Type 2  Mother      Diabetes Father      Alcoholism Father      No Known Problems Sister      No Known Problems Sister      No Known Problems Sister      Alcoholism Brother      Multiple myeloma Brother      Pancreatic Cancer Brother      No Known Problems Maternal Grandmother      No Known Problems Maternal Grandfather      No Known Problems Paternal Grandmother      No Known Problems Other      Breast Cancer Maternal Cousin          Physical Exam:   /81 (BP Location: Left arm, Patient Position: Sitting, Cuff Size: Adult Large)   Pulse 84   Temp 98  F (36.7  C) (Oral)   Resp 18   Wt 86.9 kg (191 lb 9.6 oz)   SpO2 98%   BMI 31.40 kg/m      General Appearance: healthy and alert, no distress    Gastrointestinal:       abdomen soft, non-tender, non-distended, no organomegaly or masses    Genitourinary: External genitalia and urethral meatus appears normal.  Vagina is smooth with a normal appearing vaginal cuff and no signs of radiation changes at this time.  Bimanual exam without fullness, masses or tenderness.  Rectovaginal exam confirms these findings    Labs:  None       ECOG 0    Assessment:  Dixie Miller is a 63 year old woman with stage II, grade 3 endometrial adenocarcinoma     A total of 20 minutes was spent on patient care today.       Plan:     1.)    Stage II, grade 3 endometrial cancer, dMMR-On , control arm.  Currently ELPIDIO.  CT reviewed without evidence of disease.  Reviewed signs and symptoms of recurrence and to call if these should occur. Imaging will be obtained per study protocol and is due next in 6 months. She has reached the two year ana luisa and thus surveillance visits can be spaced out per study protocol to every 6 months for  up to 5 years (8/27) then annually thereafter. She will return in 6 months with NP and then in 12 months with myself with CT prior.      2.) Genetic risk factors were assessed and the patient has undergone genetic testing which was negative    3.) Labs and/or tests ordered include:  CT C/A/P     4.) Health maintenance issues addressed today include pt is up to date          Thank you for allowing us to participate in the care of your patient.         Sincerely,    Germania Good MD  Gynecologic Oncology  [facility]       CC  MASTERS, EREN THURMAN      Again, thank you for allowing me to participate in the care of your patient.        Sincerely,        Jose Alfredo Good MD

## 2024-07-19 NOTE — PROGRESS NOTES
Consult Notes on Referred Patient        Dr. Stefanie Cho Masters, DO  [address] [facility] [address]  [place] [address]       RE: Dixie Miller  : 1960  DERICK: 2024    HPI:  Dixie Miller is 63 year old with stage II, grade 3 endometrial adenocarcinoma, dMMR type.  She is unaccompanied today.  She is overall feeling well and has no specific concerns.  She denies vaginal bleeding.  She has noted decreased libido lately.    Cancer Course:  Patient initially presented with a month history of PMB with cramping.  This was mostly light spotting and she has not had any heavy bleeding. She also has been having a rash which is patchy, itchy and moves to several areas of her body for the past several months.  She had a biopsy which showed non-specific dermatitis.  She has follow up with dermatology for a second opinion later this week.    22:  US pelvis:  Uterus: anteverted. Contour is smooth/regular. Size: 8.21 x 5.20 x 4.44 cm Endometrium: Thickness Total 24.00 mm Findings: Thick Right Ovary: Not seen Left Ovary: 1.87 x 1.61 x .95 cm. Wnl Cul de Sac Free Fluid: No free fluid    22:  EMB:  High grade carcinoma with squamous differentiation    22:  : 24    6/3/22:  CT C/A/P:  1.  1 cm nodule in the right breast centrally. 2.  5 mm on the nodule in the lateral right lower lobe. 3.  Low dense lesion too small to characterize in the liver is probably a cyst. 4.  Markedly thickened endometrium compatible with history. 5.  No adenopathy. 6.  L2 compression deformity.    22:  Total laparoscopic hysterectomy, bilateral salpingo-oophorectomy, bilateral pelvic sentinel lymph node dissection, lysis of adhesions for > 30 minutes, omentectomy, cystoscopy   Pathology:  Grade 3 endometrial adenocarcinoma with extensive squamous differentiation, tumor size 6.6 cm, 9/13 mm myometrial invasion, + JENNIFER, + cervix, +LVSI, 0/11 sentinel LN, negative omentum, Loss of MLH1/PMS2 with + hypermethylation,  p53 normal    22-22:  Completed XRT per  study protocol (Control arm)    24:  CT C/A/P (personally reviewed):  Since 2024, no evidence of progressive metastatic disease within the chest, abdomen or pelvis.      Obstetrics and Gynecology History:  ,  x 2, c/s x 1  Menopause around age 54, no HRT use      Past Medical History:  Past Medical History:   Diagnosis Date    Anxiety     Benign essential hypertension     added 2nd med 9/15    Depressive disorder     Dysthymia     Endometrial cancer (H)     High cholesterol     IBS (irritable bowel syndrome)        Past Surgical History:  Past Surgical History:   Procedure Laterality Date     SECTION      LAPAROSCOPIC HYSTERECTOMY TOTAL, BILATERAL SALPINGO-OOPHORECTOMY, COMBINED N/A 2022    Procedure: laparoscopic removal of uterus, cervix, bilateral ovaries and fallopian tubes, bilateral pelvic sentinel lymph node dissection, lysis of adhesions 30 minutes, omentectomy, cystoscopy;  Surgeon: Germania Espinal MD;  Location: [facility] [place]       Health Maintenance:  Last Pap Smear: No need for further pap smear exams s/p hysterectomy  She has not had a history of abnormal Pap smears.    Last Mammogram: 23             Result: normal      She has not had a history of abnormal mammograms.    Last Colonoscopy: 24              Result: Polyps, repeat 5 years       Social History:  Lives with her , feels safe at home.  Works in retail part time.  Enjoys swimming, walking, spending time with friends, book clubs.  Does have an advanced directive on file and would like her son, Steve and her , Endy to be her POA.  Would like full resuscitation if reversible cause is identified, however would not like to be kept on life sustaining measures long-term.     Family History:   The patient's family history is significant for.  Family History   Problem Relation Age of Onset    Diabetes Type 2  Mother     Diabetes  Father     Alcoholism Father     No Known Problems Sister     No Known Problems Sister     No Known Problems Sister     Alcoholism Brother     Multiple myeloma Brother     Pancreatic Cancer Brother     No Known Problems Maternal Grandmother     No Known Problems Maternal Grandfather     No Known Problems Paternal Grandmother     No Known Problems Other     Breast Cancer Maternal Cousin          Physical Exam:   /81 (BP Location: Left arm, Patient Position: Sitting, Cuff Size: Adult Large)   Pulse 84   Temp 98  F (36.7  C) (Oral)   Resp 18   Wt 86.9 kg (191 lb 9.6 oz)   SpO2 98%   BMI 31.40 kg/m      General Appearance: healthy and alert, no distress    Gastrointestinal:       abdomen soft, non-tender, non-distended, no organomegaly or masses    Genitourinary: External genitalia and urethral meatus appears normal.  Vagina is smooth with a normal appearing vaginal cuff and no signs of radiation changes at this time.  Bimanual exam without fullness, masses or tenderness.  Rectovaginal exam confirms these findings    Labs:  None       ECOG 0    Assessment:  Dixie Miller is a 63 year old woman with stage II, grade 3 endometrial adenocarcinoma     A total of 20 minutes was spent on patient care today.       Plan:     1.)    Stage II, grade 3 endometrial cancer, dMMR-On , control arm.  Currently ELPIDIO.  CT reviewed without evidence of disease.  Reviewed signs and symptoms of recurrence and to call if these should occur. Imaging will be obtained per study protocol and is due next in 6 months. She has reached the two year ana luisa and thus surveillance visits can be spaced out per study protocol to every 6 months for up to 5 years (8/27) then annually thereafter. She will return in 6 months with NP and then in 12 months with myself with CT prior.      2.) Genetic risk factors were assessed and the patient has undergone genetic testing which was negative    3.) Labs and/or tests ordered include:  CT  C/A/P     4.) Health maintenance issues addressed today include pt is up to date          Thank you for allowing us to participate in the care of your patient.         Sincerely,    Germania Good MD  Gynecologic Oncology  [facility]       CC  MASTERS, EREN THURMAN

## 2024-08-28 ENCOUNTER — HOSPITAL ENCOUNTER (OUTPATIENT)
Dept: MAMMOGRAPHY | Facility: CLINIC | Age: 64
Discharge: HOME | End: 2024-08-28
Attending: INTERNAL MEDICINE | Admitting: INTERNAL MEDICINE
Payer: COMMERCIAL

## 2024-08-28 DIAGNOSIS — Z12.31 VISIT FOR SCREENING MAMMOGRAM: ICD-10-CM

## 2024-08-28 PROCEDURE — 77063 BREAST TOMOSYNTHESIS BI: CPT

## 2025-01-13 ENCOUNTER — HOSPITAL ENCOUNTER (OUTPATIENT)
Dept: CT IMAGING | Facility: CLINIC | Age: 65
Discharge: HOME | End: 2025-01-13
Attending: OBSTETRICS & GYNECOLOGY | Admitting: OBSTETRICS & GYNECOLOGY
Payer: COMMERCIAL

## 2025-01-13 DIAGNOSIS — C54.1 ENDOMETRIAL CANCER (H): ICD-10-CM

## 2025-01-13 PROCEDURE — 74177 CT ABD & PELVIS W/CONTRAST: CPT

## 2025-01-13 PROCEDURE — 250N000011 HC RX IP 250 OP 636: Performed by: OBSTETRICS & GYNECOLOGY

## 2025-01-13 PROCEDURE — 71260 CT THORAX DX C+: CPT

## 2025-01-13 PROCEDURE — 250N000009 HC RX 250: Performed by: OBSTETRICS & GYNECOLOGY

## 2025-01-13 RX ORDER — IOPAMIDOL 755 MG/ML
94 INJECTION, SOLUTION INTRAVASCULAR ONCE
Status: COMPLETED | OUTPATIENT
Start: 2025-01-13 | End: 2025-01-13

## 2025-01-13 RX ADMIN — SODIUM CHLORIDE 67 ML: 9 INJECTION, SOLUTION INTRAVENOUS at 11:52

## 2025-01-13 RX ADMIN — IOPAMIDOL 94 ML: 755 INJECTION, SOLUTION INTRAVENOUS at 11:52

## 2025-01-26 ENCOUNTER — HEALTH MAINTENANCE LETTER (OUTPATIENT)
Age: 65
End: 2025-01-26

## 2025-05-01 ENCOUNTER — APPOINTMENT (OUTPATIENT)
Dept: URBAN - METROPOLITAN AREA CLINIC 256 | Age: 65
Setting detail: DERMATOLOGY
End: 2025-05-01

## 2025-05-01 VITALS — WEIGHT: 188 LBS | HEIGHT: 65 IN

## 2025-05-01 DIAGNOSIS — Z71.89 OTHER SPECIFIED COUNSELING: ICD-10-CM

## 2025-05-01 DIAGNOSIS — Z87.2 PERSONAL HISTORY OF DISEASES OF THE SKIN AND SUBCUTANEOUS TISSUE: ICD-10-CM

## 2025-05-01 DIAGNOSIS — D18.0 HEMANGIOMA: ICD-10-CM

## 2025-05-01 DIAGNOSIS — D22 MELANOCYTIC NEVI: ICD-10-CM

## 2025-05-01 DIAGNOSIS — L57.8 OTHER SKIN CHANGES DUE TO CHRONIC EXPOSURE TO NONIONIZING RADIATION: ICD-10-CM

## 2025-05-01 DIAGNOSIS — L82.1 OTHER SEBORRHEIC KERATOSIS: ICD-10-CM

## 2025-05-01 PROBLEM — D22.5 MELANOCYTIC NEVI OF TRUNK: Status: ACTIVE | Noted: 2025-05-01

## 2025-05-01 PROBLEM — D18.01 HEMANGIOMA OF SKIN AND SUBCUTANEOUS TISSUE: Status: ACTIVE | Noted: 2025-05-01

## 2025-05-01 PROCEDURE — OTHER SUNSCREEN RECOMMENDATIONS: OTHER

## 2025-05-01 PROCEDURE — OTHER COUNSELING: OTHER

## 2025-05-01 PROCEDURE — OTHER MIPS QUALITY: OTHER

## 2025-05-01 PROCEDURE — 99213 OFFICE O/P EST LOW 20 MIN: CPT

## 2025-05-01 ASSESSMENT — LOCATION DETAILED DESCRIPTION DERM
LOCATION DETAILED: SUPERIOR LUMBAR SPINE
LOCATION DETAILED: SUPERIOR THORACIC SPINE
LOCATION DETAILED: EPIGASTRIC SKIN

## 2025-05-01 ASSESSMENT — LOCATION SIMPLE DESCRIPTION DERM
LOCATION SIMPLE: ABDOMEN
LOCATION SIMPLE: UPPER BACK
LOCATION SIMPLE: LOWER BACK

## 2025-05-01 ASSESSMENT — LOCATION ZONE DERM: LOCATION ZONE: TRUNK

## 2025-07-16 ENCOUNTER — HOSPITAL ENCOUNTER (OUTPATIENT)
Dept: CT IMAGING | Facility: CLINIC | Age: 65
Discharge: HOME | End: 2025-07-16
Attending: OBSTETRICS & GYNECOLOGY
Payer: COMMERCIAL

## 2025-07-16 DIAGNOSIS — Z85.42 ENCOUNTER FOR FOLLOW-UP SURVEILLANCE OF ENDOMETRIAL CANCER: ICD-10-CM

## 2025-07-16 DIAGNOSIS — Z08 ENCOUNTER FOR FOLLOW-UP SURVEILLANCE OF ENDOMETRIAL CANCER: ICD-10-CM

## 2025-07-16 PROCEDURE — 250N000009 HC RX 250: Performed by: OBSTETRICS & GYNECOLOGY

## 2025-07-16 PROCEDURE — 250N000011 HC RX IP 250 OP 636: Performed by: OBSTETRICS & GYNECOLOGY

## 2025-07-16 PROCEDURE — 71260 CT THORAX DX C+: CPT

## 2025-07-16 RX ORDER — IOPAMIDOL 755 MG/ML
95 INJECTION, SOLUTION INTRAVASCULAR ONCE
Status: COMPLETED | OUTPATIENT
Start: 2025-07-16 | End: 2025-07-16

## 2025-07-16 RX ADMIN — IOPAMIDOL 95 ML: 755 INJECTION, SOLUTION INTRAVENOUS at 11:41

## 2025-07-16 RX ADMIN — SODIUM CHLORIDE 67 ML: 9 INJECTION, SOLUTION INTRAVENOUS at 11:41

## 2025-07-23 NOTE — PATIENT INSTRUCTIONS
Next visit in person with NP in 6 months    CT C/A/P in 1 year (+/- 30 days from 7/25/25)    Visit with Dr Good in 1 year after CT

## 2025-07-23 NOTE — PROGRESS NOTES
Consult Notes on Referred Patient        Dr. Stefanie Cho Masters, DO  [address] [facility] [address]  [place] [address]       RE: Dixie Miller  : 1960  DERICK: 2025    HPI:  Dixie Miller is 64 year old with stage II, grade 3 endometrial adenocarcinoma, dMMR type.  She is unaccompanied today.  She is overall doing well.  She has no cancer related concerns today.  She has no vaginal bleeding, abdominal/pelvic pain or concerns with her bowel/bladder function.    Cancer Course:  Patient initially presented with a month history of PMB with cramping.  This was mostly light spotting and she has not had any heavy bleeding. She also has been having a rash which is patchy, itchy and moves to several areas of her body for the past several months.  She had a biopsy which showed non-specific dermatitis.  She has follow up with dermatology for a second opinion later this week.    22:  US pelvis:  Uterus: anteverted. Contour is smooth/regular. Size: 8.21 x 5.20 x 4.44 cm Endometrium: Thickness Total 24.00 mm Findings: Thick Right Ovary: Not seen Left Ovary: 1.87 x 1.61 x .95 cm. Wnl Cul de Sac Free Fluid: No free fluid    22:  EMB:  High grade carcinoma with squamous differentiation    22:  : 24    6/3/22:  CT C/A/P:  1.  1 cm nodule in the right breast centrally. 2.  5 mm on the nodule in the lateral right lower lobe. 3.  Low dense lesion too small to characterize in the liver is probably a cyst. 4.  Markedly thickened endometrium compatible with history. 5.  No adenopathy. 6.  L2 compression deformity.    22:  Total laparoscopic hysterectomy, bilateral salpingo-oophorectomy, bilateral pelvic sentinel lymph node dissection, lysis of adhesions for > 30 minutes, omentectomy, cystoscopy   Pathology:  Grade 3 endometrial adenocarcinoma with extensive squamous differentiation, tumor size 6.6 cm, 9/13 mm myometrial invasion, + JENNIFER, + cervix, +LVSI, 0/11 sentinel LN, negative omentum, Loss  of MLH1/PMS2 with + hypermethylation, p53 normal    22-22:  Completed XRT per  study protocol (Control arm)    25:  CT C/A/P (personally reviewed):  No evidence of progressive metastatic disease within the chest, abdomen or pelvis.      Obstetrics and Gynecology History:  ,  x 2, c/s x 1  Menopause around age 54, no HRT use      Past Medical History:  Past Medical History:   Diagnosis Date    Anxiety     Benign essential hypertension     added 2nd med 9/15    Depressive disorder     Dysthymia     Endometrial cancer (H)     High cholesterol     IBS (irritable bowel syndrome)        Past Surgical History:  Past Surgical History:   Procedure Laterality Date     SECTION      LAPAROSCOPIC HYSTERECTOMY TOTAL, BILATERAL SALPINGO-OOPHORECTOMY, COMBINED N/A 2022    Procedure: laparoscopic removal of uterus, cervix, bilateral ovaries and fallopian tubes, bilateral pelvic sentinel lymph node dissection, lysis of adhesions 30 minutes, omentectomy, cystoscopy;  Surgeon: Germania Espinal MD;  [place] [facility] [place]       Health Maintenance:  Last Pap Smear: No need for further pap smear exams s/p hysterectomy  She has not had a history of abnormal Pap smears.    Last Mammogram: 24             Result: normal      She has not had a history of abnormal mammograms.    Last Colonoscopy: 24              Result: Polyps, repeat 5 years       Social History:  Lives with her , feels safe at home.  Works in retail part time.  Enjoys swimming, walking, spending time with friends, book clubs.  Does have an advanced directive on file and would like her son, Steve and her , Endy to be her POA.  Would like full resuscitation if reversible cause is identified, however would not like to be kept on life sustaining measures long-term.     Family History:   The patient's family history is significant for.  Family History   Problem Relation Age of Onset    Diabetes Type 2   Mother     Diabetes Father     Alcoholism Father     No Known Problems Sister     No Known Problems Sister     No Known Problems Sister     Alcoholism Brother     Multiple myeloma Brother     Pancreatic Cancer Brother     No Known Problems Maternal Grandmother     No Known Problems Maternal Grandfather     No Known Problems Paternal Grandmother     No Known Problems Other     Breast Cancer Maternal Cousin          Physical Exam:   /78 (BP Location: Left arm, Patient Position: Sitting, Cuff Size: Adult Large)   Pulse 81   Temp 98.3  F (36.8  C) (Oral)   Resp 18   Wt 79.5 kg (175 lb 4.8 oz)   SpO2 98%   BMI 28.73 kg/m      General Appearance: healthy and alert, no distress    Gastrointestinal:       abdomen soft, non-tender, non-distended, no organomegaly or masses    Genitourinary: External genitalia and urethral meatus appears normal.  Vagina is smooth with a normal appearing vaginal cuff and no signs of radiation changes at this time.  Bimanual exam without fullness, masses or tenderness.  Rectovaginal exam confirms these findings    Labs:  None       ECOG 0    Assessment:  Dixie Miller is a 64 year old woman with stage II, grade 3 endometrial adenocarcinoma     A total of 20 minutes was spent on patient care today.       Plan:     1.)    Stage II, grade 3 endometrial cancer, dMMR-On , control arm.  Currently ELPIDIO.  CT reviewed without evidence of disease.  Reviewed signs and symptoms of recurrence and to call if these should occur. Imaging will be obtained per study protocol and is due next in 12 months. Continue surveillance visits every 6 months for up to 5 years (8/27) then annually thereafter. She will return in 6 months with NP and then in 12 months with myself with CT prior.      2.) Genetic risk factors were assessed and the patient has undergone genetic testing which was negative    3.) Labs and/or tests ordered include:  CT C/A/P     4.) Health maintenance issues addressed today  include pt is up to date          Thank you for allowing us to participate in the care of your patient.         Sincerely,    Germania Good MD  Gynecologic Oncology  [facility]       CC  MASTERS, EREN THURMAN

## 2025-07-25 ENCOUNTER — ONCOLOGY VISIT (OUTPATIENT)
Dept: ONCOLOGY | Facility: CLINIC | Age: 65
End: 2025-07-25
Attending: NURSE PRACTITIONER
Payer: COMMERCIAL

## 2025-07-25 VITALS
TEMPERATURE: 98.3 F | WEIGHT: 175.3 LBS | HEART RATE: 81 BPM | SYSTOLIC BLOOD PRESSURE: 125 MMHG | RESPIRATION RATE: 18 BRPM | DIASTOLIC BLOOD PRESSURE: 78 MMHG | OXYGEN SATURATION: 98 % | BODY MASS INDEX: 28.73 KG/M2

## 2025-07-25 DIAGNOSIS — C54.1 ENDOMETRIAL CANCER (H): Primary | ICD-10-CM

## 2025-07-25 PROCEDURE — 99213 OFFICE O/P EST LOW 20 MIN: CPT | Performed by: OBSTETRICS & GYNECOLOGY

## 2025-07-25 PROCEDURE — G0463 HOSPITAL OUTPT CLINIC VISIT: HCPCS | Performed by: OBSTETRICS & GYNECOLOGY

## 2025-07-25 ASSESSMENT — PAIN SCALES - GENERAL: PAINLEVEL_OUTOF10: NO PAIN (0)

## 2025-07-25 NOTE — LETTER
2025      Dixie Miller  [address]  [place] [address]      Dear Colleague,    Thank you for referring your patient, Dixie Miller, to the [facility]. Please see a copy of my visit note below.    Consult Notes on Referred Patient        Dr. Stefanie Cho Masters, DO  [address]  [place] [address]       RE: Dixie Miller  : 1960  DERICK: 2025    HPI:  Dixie Miller is 64 year old with stage II, grade 3 endometrial adenocarcinoma, dMMR type.  She is unaccompanied today.  She is overall doing well.  She has no cancer related concerns today.  She has no vaginal bleeding, abdominal/pelvic pain or concerns with her bowel/bladder function.    Cancer Course:  Patient initially presented with a month history of PMB with cramping.  This was mostly light spotting and she has not had any heavy bleeding. She also has been having a rash which is patchy, itchy and moves to several areas of her body for the past several months.  She had a biopsy which showed non-specific dermatitis.  She has follow up with dermatology for a second opinion later this week.    22:  US pelvis:  Uterus: anteverted. Contour is smooth/regular. Size: 8.21 x 5.20 x 4.44 cm Endometrium: Thickness Total 24.00 mm Findings: Thick Right Ovary: Not seen Left Ovary: 1.87 x 1.61 x .95 cm. Wnl [place] de Sac Free Fluid: No free fluid    22:  EMB:  High grade carcinoma with squamous differentiation    22:  : 24    6/3/22:  CT C/A/P:  1.  1 cm nodule in the right breast centrally. 2.  5 mm on the nodule in the lateral right lower lobe. 3.  Low dense lesion too small to characterize in the liver is probably a cyst. 4.  Markedly thickened endometrium compatible with history. 5.  No adenopathy. 6.  L2 compression deformity.    22:  Total laparoscopic hysterectomy, bilateral salpingo-oophorectomy, bilateral pelvic sentinel lymph node dissection, lysis  of adhesions for > 30 minutes, omentectomy, cystoscopy   Pathology:  Grade 3 endometrial adenocarcinoma with extensive squamous differentiation, tumor size 6.6 cm, 9/13 mm myometrial invasion, + JENNIFER, + cervix, +LVSI, 0/11 sentinel LN, negative omentum, Loss of MLH1/PMS2 with + hypermethylation, p53 normal    22-22:  Completed XRT per  study protocol (Control arm)    25:  CT C/A/P (personally reviewed):  No evidence of progressive metastatic disease within the chest, abdomen or pelvis.      Obstetrics and Gynecology History:  ,  x 2, c/s x 1  Menopause around age 54, no HRT use      Past Medical History:  Past Medical History:   Diagnosis Date     Anxiety      Benign essential hypertension     added 2nd med 9/15     Depressive disorder      Dysthymia      Endometrial cancer (H)      High cholesterol      IBS (irritable bowel syndrome)        Past Surgical History:  Past Surgical History:   Procedure Laterality Date      SECTION       LAPAROSCOPIC HYSTERECTOMY TOTAL, BILATERAL SALPINGO-OOPHORECTOMY, COMBINED N/A 2022    Procedure: laparoscopic removal of uterus, cervix, bilateral ovaries and fallopian tubes, bilateral pelvic sentinel lymph node dissection, lysis of adhesions 30 minutes, omentectomy, cystoscopy;  Surgeon: Germania Espinal MD;  [place] [facility] [place]       Health Maintenance:  Last Pap Smear: No need for further pap smear exams s/p hysterectomy  She has not had a history of abnormal Pap smears.    Last Mammogram: 24             Result: normal      She has not had a history of abnormal mammograms.    Last Colonoscopy: 22              Result: Polyps, repeat 5 years       Social History:  Lives with her , feels safe at home.  Works in retail part time.  Enjoys swimming, walking, spending time with friends, book clubs.  Does have an advanced directive on file and would like her son, Steve and her , Endy to be her POA.  Would  like full resuscitation if reversible cause is identified, however would not like to be kept on life sustaining measures long-term.     Family History:   The patient's family history is significant for.  Family History   Problem Relation Age of Onset     Diabetes Type 2  Mother      Diabetes Father      Alcoholism Father      No Known Problems Sister      No Known Problems Sister      No Known Problems Sister      Alcoholism Brother      Multiple myeloma Brother      Pancreatic Cancer Brother      No Known Problems Maternal Grandmother      No Known Problems Maternal Grandfather      No Known Problems Paternal Grandmother      No Known Problems Other      Breast Cancer Maternal Cousin          Physical Exam:   /78 (BP Location: Left arm, Patient Position: Sitting, Cuff Size: Adult Large)   Pulse 81   Temp 98.3  F (36.8  C) (Oral)   Resp 18   Wt 79.5 kg (175 lb 4.8 oz)   SpO2 98%   BMI 28.73 kg/m      General Appearance: healthy and alert, no distress    Gastrointestinal:       abdomen soft, non-tender, non-distended, no organomegaly or masses    Genitourinary: External genitalia and urethral meatus appears normal.  Vagina is smooth with a normal appearing vaginal cuff and no signs of radiation changes at this time.  Bimanual exam without fullness, masses or tenderness.  Rectovaginal exam confirms these findings    Labs:  None       ECOG 0    Assessment:  Dixie Miller is a 64 year old woman with stage II, grade 3 endometrial adenocarcinoma     A total of 20 minutes was spent on patient care today.       Plan:     1.)    Stage II, grade 3 endometrial cancer, dMMR-On , control arm.  Currently ELPIDIO.  CT reviewed without evidence of disease.  Reviewed signs and symptoms of recurrence and to call if these should occur. Imaging will be obtained per study protocol and is due next in 12 months. Continue surveillance visits every 6 months for up to 5 years (8/27) then annually thereafter. She will  "return in 6 months with NP and then in 12 months with myself with CT prior.      2.) Genetic risk factors were assessed and the patient has undergone genetic testing which was negative    3.) Labs and/or tests ordered include:  CT C/A/P     4.) Health maintenance issues addressed today include pt is up to date          Thank you for allowing us to participate in the care of your patient.         Sincerely,    Germania Good MD  Gynecologic Oncology  [facility]       CC  MASTERS, EREN THURMAN      Oncology Rooming Note    July 25, 2025 12:41 PM   Dixie Miller is a 64 year old female who presents for:    Chief Complaint   Patient presents with     Oncology Clinic Visit     Endometrial cancer (H)       Initial Vitals: /78 (BP Location: Left arm, Patient Position: Sitting, Cuff Size: Adult Large)   Pulse 81   Temp 98.3  F (36.8  C) (Oral)   Resp 18   Wt 79.5 kg (175 lb 4.8 oz)   SpO2 98%   BMI 28.73 kg/m   Estimated body mass index is 28.73 kg/m  as calculated from the following:    Height as of 10/27/23: 1.664 m (5' 5.5\").    Weight as of this encounter: 79.5 kg (175 lb 4.8 oz). Body surface area is 1.92 meters squared.  No Pain (0) Comment: Data Unavailable   No LMP recorded. Patient is postmenopausal.  Allergies reviewed: Yes  Medications reviewed: Yes    Medications: Medication refills not needed today.  Pharmacy name entered into [employer]:    [id number] [place] [facility] [address]  [id number] [address] [facility] AT [facility] [address]    PHQ9:  Did this patient require a PHQ9?: No      Clinical concerns: no     Shanda Allison CMA                Again, thank you for allowing me to participate in the care of your patient.        Sincerely,        Jose Alfredo Good MD    Electronically signed"

## 2025-07-25 NOTE — PROGRESS NOTES
"Oncology Rooming Note    July 25, 2025 12:41 PM   Dixie Miller is a 64 year old female who presents for:    Chief Complaint   Patient presents with    Oncology Clinic Visit     Endometrial cancer (H)       Initial Vitals: /78 (BP Location: Left arm, Patient Position: Sitting, Cuff Size: Adult Large)   Pulse 81   Temp 98.3  F (36.8  C) (Oral)   Resp 18   Wt 79.5 kg (175 lb 4.8 oz)   SpO2 98%   BMI 28.73 kg/m   Estimated body mass index is 28.73 kg/m  as calculated from the following:    Height as of 10/27/23: 1.664 m (5' 5.5\").    Weight as of this encounter: 79.5 kg (175 lb 4.8 oz). Body surface area is 1.92 meters squared.  No Pain (0) Comment: Data Unavailable   No LMP recorded. Patient is postmenopausal.  Allergies reviewed: Yes  Medications reviewed: Yes    Medications: Medication refills not needed today.  Pharmacy name entered into [facility]:    [id number] [place] [facility] [place] [place] - [address] [id number] [place] [place] - [address] AT [facility] [address]    PHQ9:  Did this patient require a PHQ9?: No      Clinical concerns: no     Shanda Allison CMA              "

## (undated) DEVICE — NDL INSUFFLATION 13GA 120MM C2201

## (undated) DEVICE — GLOVE PROTEXIS POWDER FREE SMT 6.5  2D72PT65X

## (undated) DEVICE — SU WND CLOSURE RELOAD VLOC 180 ABS 0 GREEN 8" VLOCA008L

## (undated) DEVICE — SUCTION MANIFOLD NEPTUNE 2 SYS 4 PORT 0702-020-000

## (undated) DEVICE — ENDO TROCAR FIRST ENTRY KII FIOS Z-THRD 05X100MM CTF03

## (undated) DEVICE — ADH SKIN CLOSURE PREMIERPRO EXOFIN 1.0ML 3470

## (undated) DEVICE — RX SURGIFLO HEMOSTATIC MATRIX W/THROMBIN 8ML 2994

## (undated) DEVICE — GLOVE PROTEXIS BLUE W/NEU-THERA 7.0  2D73EB70

## (undated) DEVICE — SOL NACL 0.9% IRRIG 500ML BOTTLE 2F7123

## (undated) DEVICE — SU VICRYL 3-0 SH 27" J316H

## (undated) DEVICE — KIT PATIENT POSITIONING PIGAZZI LATEX FREE 40580

## (undated) DEVICE — ESU LIGASURE LAPAROSCOPIC BLUNT TIP SEALER 5MMX37CM LF1837

## (undated) DEVICE — ENDO TROCAR SLEEVE KII Z-THREADED 05X100MM CTS02

## (undated) DEVICE — PREP TECHNI-CARE CHLOROXYLENOL 3% 4OZ BOTTLE C222-4ZWO

## (undated) DEVICE — TUBING SMOKE EVAC PNEUVIEW 9660-XE

## (undated) DEVICE — Device

## (undated) DEVICE — KOH COLPOTOMIZER OCCLUDER  CPO-6

## (undated) DEVICE — BAG URIMETER FOLEY KIT W/16FR FOLEY

## (undated) DEVICE — ESU ENDO SCISSORS 5MM CVD 5DCS

## (undated) DEVICE — LUBRICATING JELLY 2OZ LJT2/04-8917

## (undated) DEVICE — LINEN POUCH DBL 5427

## (undated) DEVICE — SOL NACL 0.9% INJ 1000ML BAG 2B1324X

## (undated) DEVICE — JELLY LUBRICATING SURGILUBE 2OZ TUBE

## (undated) DEVICE — PREP CHLORAPREP 26ML TINTED ORANGE  260815

## (undated) DEVICE — SUCTION IRR STRYKERFLOW II W/TIP 250-070-520

## (undated) DEVICE — DEVICE SUTURE GRASPER TROCAR CLOSURE 14GA PMITCSG

## (undated) DEVICE — ENDO TROCAR FIRST ENTRY KII FIOS Z-THRD 12X100MM CTF73

## (undated) DEVICE — LINEN TOWEL PACK X5 5464

## (undated) DEVICE — DEVICE ENDO STITCH APPLIER 10MM 173016

## (undated) RX ORDER — EPHEDRINE SULFATE 50 MG/ML
INJECTION, SOLUTION INTRAMUSCULAR; INTRAVENOUS; SUBCUTANEOUS
Status: DISPENSED
Start: 2022-06-06

## (undated) RX ORDER — PROPOFOL 10 MG/ML
INJECTION, EMULSION INTRAVENOUS
Status: DISPENSED
Start: 2022-06-06

## (undated) RX ORDER — METRONIDAZOLE 500 MG/100ML
INJECTION, SOLUTION INTRAVENOUS
Status: DISPENSED
Start: 2022-06-06

## (undated) RX ORDER — HEPARIN SODIUM 10000 [USP'U]/ML
INJECTION, SOLUTION INTRAVENOUS; SUBCUTANEOUS
Status: DISPENSED
Start: 2022-06-06

## (undated) RX ORDER — ONDANSETRON 2 MG/ML
INJECTION INTRAMUSCULAR; INTRAVENOUS
Status: DISPENSED
Start: 2022-06-06

## (undated) RX ORDER — HYDROMORPHONE HYDROCHLORIDE 1 MG/ML
INJECTION, SOLUTION INTRAMUSCULAR; INTRAVENOUS; SUBCUTANEOUS
Status: DISPENSED
Start: 2022-06-06

## (undated) RX ORDER — FENTANYL CITRATE 50 UG/ML
INJECTION, SOLUTION INTRAMUSCULAR; INTRAVENOUS
Status: DISPENSED
Start: 2022-06-06

## (undated) RX ORDER — INDOCYANINE GREEN AND WATER 25 MG
KIT INJECTION
Status: DISPENSED
Start: 2022-06-06

## (undated) RX ORDER — LIDOCAINE HYDROCHLORIDE 20 MG/ML
INJECTION, SOLUTION EPIDURAL; INFILTRATION; INTRACAUDAL; PERINEURAL
Status: DISPENSED
Start: 2022-06-06

## (undated) RX ORDER — OXYCODONE HYDROCHLORIDE 5 MG/1
TABLET ORAL
Status: DISPENSED
Start: 2022-06-06

## (undated) RX ORDER — DEXAMETHASONE SODIUM PHOSPHATE 4 MG/ML
INJECTION, SOLUTION INTRA-ARTICULAR; INTRALESIONAL; INTRAMUSCULAR; INTRAVENOUS; SOFT TISSUE
Status: DISPENSED
Start: 2022-06-06

## (undated) RX ORDER — GLYCOPYRROLATE 0.2 MG/ML
INJECTION INTRAMUSCULAR; INTRAVENOUS
Status: DISPENSED
Start: 2022-06-06

## (undated) RX ORDER — CEFAZOLIN SODIUM 2 G/50ML
SOLUTION INTRAVENOUS
Status: DISPENSED
Start: 2022-06-06